# Patient Record
Sex: FEMALE | Race: WHITE | Employment: FULL TIME | URBAN - METROPOLITAN AREA
[De-identification: names, ages, dates, MRNs, and addresses within clinical notes are randomized per-mention and may not be internally consistent; named-entity substitution may affect disease eponyms.]

---

## 2017-01-09 ENCOUNTER — OFFICE VISIT (OUTPATIENT)
Dept: FAMILY MEDICINE | Facility: CLINIC | Age: 62
End: 2017-01-09
Payer: COMMERCIAL

## 2017-01-09 VITALS
TEMPERATURE: 98.1 F | BODY MASS INDEX: 29.86 KG/M2 | OXYGEN SATURATION: 96 % | WEIGHT: 197 LBS | HEIGHT: 68 IN | SYSTOLIC BLOOD PRESSURE: 118 MMHG | HEART RATE: 95 BPM | DIASTOLIC BLOOD PRESSURE: 84 MMHG

## 2017-01-09 DIAGNOSIS — R39.15 URINARY URGENCY: ICD-10-CM

## 2017-01-09 DIAGNOSIS — Z13.820 SCREENING FOR OSTEOPOROSIS: ICD-10-CM

## 2017-01-09 DIAGNOSIS — D12.6 ADENOMATOUS COLON POLYP: ICD-10-CM

## 2017-01-09 DIAGNOSIS — I82.401 ACUTE DEEP VEIN THROMBOSIS (DVT) OF RIGHT LOWER EXTREMITY, UNSPECIFIED VEIN (H): ICD-10-CM

## 2017-01-09 DIAGNOSIS — Z00.8 ENCOUNTER FOR BIOMETRIC SCREENING: ICD-10-CM

## 2017-01-09 DIAGNOSIS — E66.09 NON MORBID OBESITY DUE TO EXCESS CALORIES: ICD-10-CM

## 2017-01-09 DIAGNOSIS — Z23 NEED FOR PROPHYLACTIC VACCINATION AND INOCULATION AGAINST INFLUENZA: ICD-10-CM

## 2017-01-09 DIAGNOSIS — E78.5 HYPERLIPIDEMIA LDL GOAL <130: ICD-10-CM

## 2017-01-09 DIAGNOSIS — Z00.00 ROUTINE GENERAL MEDICAL EXAMINATION AT A HEALTH CARE FACILITY: Primary | ICD-10-CM

## 2017-01-09 DIAGNOSIS — A60.00 GENITAL HERPES SIMPLEX, UNSPECIFIED SITE: ICD-10-CM

## 2017-01-09 DIAGNOSIS — Z12.11 SPECIAL SCREENING FOR MALIGNANT NEOPLASMS, COLON: ICD-10-CM

## 2017-01-09 DIAGNOSIS — Z11.59 NEED FOR HEPATITIS C SCREENING TEST: ICD-10-CM

## 2017-01-09 DIAGNOSIS — F33.41 DEPRESSION, MAJOR, RECURRENT, IN PARTIAL REMISSION (H): ICD-10-CM

## 2017-01-09 DIAGNOSIS — Z83.49 FAMILY HISTORY OF THYROID DISORDER: ICD-10-CM

## 2017-01-09 DIAGNOSIS — Z78.0 ASYMPTOMATIC POSTMENOPAUSAL STATUS: ICD-10-CM

## 2017-01-09 LAB
ALBUMIN SERPL-MCNC: 4.3 G/DL (ref 3.4–5)
ALBUMIN UR-MCNC: NEGATIVE MG/DL
ALP SERPL-CCNC: 69 U/L (ref 40–150)
ALT SERPL W P-5'-P-CCNC: 20 U/L (ref 0–50)
ANION GAP SERPL CALCULATED.3IONS-SCNC: 10 MMOL/L (ref 3–14)
APPEARANCE UR: CLEAR
AST SERPL W P-5'-P-CCNC: 10 U/L (ref 0–45)
BILIRUB SERPL-MCNC: 0.6 MG/DL (ref 0.2–1.3)
BILIRUB UR QL STRIP: NEGATIVE
BUN SERPL-MCNC: 17 MG/DL (ref 7–30)
CALCIUM SERPL-MCNC: 9.3 MG/DL (ref 8.5–10.1)
CHLORIDE SERPL-SCNC: 107 MMOL/L (ref 94–109)
CHOLEST SERPL-MCNC: 203 MG/DL
CO2 SERPL-SCNC: 24 MMOL/L (ref 20–32)
COLOR UR AUTO: YELLOW
CREAT SERPL-MCNC: 0.73 MG/DL (ref 0.52–1.04)
ERYTHROCYTE [DISTWIDTH] IN BLOOD BY AUTOMATED COUNT: 13.1 % (ref 10–15)
GFR SERPL CREATININE-BSD FRML MDRD: 81 ML/MIN/1.7M2
GLUCOSE SERPL-MCNC: 85 MG/DL (ref 70–99)
GLUCOSE UR STRIP-MCNC: NEGATIVE MG/DL
HBA1C MFR BLD: 5.3 % (ref 4.3–6)
HCT VFR BLD AUTO: 40.6 % (ref 35–47)
HDLC SERPL-MCNC: 60 MG/DL
HGB BLD-MCNC: 13.9 G/DL (ref 11.7–15.7)
HGB UR QL STRIP: NEGATIVE
KETONES UR STRIP-MCNC: NEGATIVE MG/DL
LDLC SERPL CALC-MCNC: 98 MG/DL
LEUKOCYTE ESTERASE UR QL STRIP: NEGATIVE
MCH RBC QN AUTO: 32.2 PG (ref 26.5–33)
MCHC RBC AUTO-ENTMCNC: 34.2 G/DL (ref 31.5–36.5)
MCV RBC AUTO: 94 FL (ref 78–100)
NITRATE UR QL: NEGATIVE
NONHDLC SERPL-MCNC: 143 MG/DL
PH UR STRIP: 5 PH (ref 5–7)
PLATELET # BLD AUTO: 191 10E9/L (ref 150–450)
POTASSIUM SERPL-SCNC: 4.1 MMOL/L (ref 3.4–5.3)
PROT SERPL-MCNC: 7.7 G/DL (ref 6.8–8.8)
RBC # BLD AUTO: 4.32 10E12/L (ref 3.8–5.2)
SODIUM SERPL-SCNC: 141 MMOL/L (ref 133–144)
SP GR UR STRIP: 1.02 (ref 1–1.03)
TRIGL SERPL-MCNC: 227 MG/DL
TSH SERPL DL<=0.005 MIU/L-ACNC: 1.89 MU/L (ref 0.4–4)
URN SPEC COLLECT METH UR: NORMAL
UROBILINOGEN UR STRIP-ACNC: 0.2 EU/DL (ref 0.2–1)
WBC # BLD AUTO: 5.3 10E9/L (ref 4–11)

## 2017-01-09 PROCEDURE — 36415 COLL VENOUS BLD VENIPUNCTURE: CPT | Performed by: FAMILY MEDICINE

## 2017-01-09 PROCEDURE — 85027 COMPLETE CBC AUTOMATED: CPT | Performed by: FAMILY MEDICINE

## 2017-01-09 PROCEDURE — 86803 HEPATITIS C AB TEST: CPT | Mod: 90 | Performed by: FAMILY MEDICINE

## 2017-01-09 PROCEDURE — 84443 ASSAY THYROID STIM HORMONE: CPT | Performed by: FAMILY MEDICINE

## 2017-01-09 PROCEDURE — 99386 PREV VISIT NEW AGE 40-64: CPT | Mod: 25 | Performed by: FAMILY MEDICINE

## 2017-01-09 PROCEDURE — 80061 LIPID PANEL: CPT | Performed by: FAMILY MEDICINE

## 2017-01-09 PROCEDURE — 99000 SPECIMEN HANDLING OFFICE-LAB: CPT | Performed by: FAMILY MEDICINE

## 2017-01-09 PROCEDURE — 90471 IMMUNIZATION ADMIN: CPT | Performed by: FAMILY MEDICINE

## 2017-01-09 PROCEDURE — 82043 UR ALBUMIN QUANTITATIVE: CPT | Performed by: FAMILY MEDICINE

## 2017-01-09 PROCEDURE — 80053 COMPREHEN METABOLIC PANEL: CPT | Performed by: FAMILY MEDICINE

## 2017-01-09 PROCEDURE — 99214 OFFICE O/P EST MOD 30 MIN: CPT | Mod: 25 | Performed by: FAMILY MEDICINE

## 2017-01-09 PROCEDURE — 83036 HEMOGLOBIN GLYCOSYLATED A1C: CPT | Performed by: FAMILY MEDICINE

## 2017-01-09 PROCEDURE — 90686 IIV4 VACC NO PRSV 0.5 ML IM: CPT | Performed by: FAMILY MEDICINE

## 2017-01-09 PROCEDURE — 81003 URINALYSIS AUTO W/O SCOPE: CPT | Performed by: FAMILY MEDICINE

## 2017-01-09 RX ORDER — SIMVASTATIN 40 MG
TABLET ORAL
Qty: 90 TABLET | Refills: 1 | Status: SHIPPED | OUTPATIENT
Start: 2017-01-09 | End: 2017-01-31

## 2017-01-09 RX ORDER — CITALOPRAM HYDROBROMIDE 20 MG/1
TABLET ORAL
Qty: 90 TABLET | Refills: 1 | Status: SHIPPED | OUTPATIENT
Start: 2017-01-09 | End: 2017-01-31

## 2017-01-09 RX ORDER — VALACYCLOVIR HYDROCHLORIDE 500 MG/1
500 TABLET, FILM COATED ORAL 2 TIMES DAILY
Qty: 6 TABLET | Refills: 0 | Status: SHIPPED | OUTPATIENT
Start: 2017-01-09 | End: 2017-10-31

## 2017-01-09 ASSESSMENT — ANXIETY QUESTIONNAIRES
3. WORRYING TOO MUCH ABOUT DIFFERENT THINGS: SEVERAL DAYS
7. FEELING AFRAID AS IF SOMETHING AWFUL MIGHT HAPPEN: NOT AT ALL
1. FEELING NERVOUS, ANXIOUS, OR ON EDGE: NOT AT ALL
IF YOU CHECKED OFF ANY PROBLEMS ON THIS QUESTIONNAIRE, HOW DIFFICULT HAVE THESE PROBLEMS MADE IT FOR YOU TO DO YOUR WORK, TAKE CARE OF THINGS AT HOME, OR GET ALONG WITH OTHER PEOPLE: NOT DIFFICULT AT ALL
GAD7 TOTAL SCORE: 2
5. BEING SO RESTLESS THAT IT IS HARD TO SIT STILL: NOT AT ALL
6. BECOMING EASILY ANNOYED OR IRRITABLE: NOT AT ALL
2. NOT BEING ABLE TO STOP OR CONTROL WORRYING: SEVERAL DAYS

## 2017-01-09 ASSESSMENT — PATIENT HEALTH QUESTIONNAIRE - PHQ9: 5. POOR APPETITE OR OVEREATING: NOT AT ALL

## 2017-01-09 NOTE — Clinical Note
Please abstract the following data from this visit with this patient into the appropriate field in Epic:  Colonoscopy done on this date: 1/2016  (approximately), by this group: [facility] , results were clear - but hx of adenomatous polyps - needs repeat in 2021.  Pap smear done on this date: 11/17/2015  (approximately), by this group: [facility] , results were normal..

## 2017-01-09 NOTE — MR AVS SNAPSHOT
After Visit Summary   1/9/2017    Inessa Castaneda    MRN: [id number]           Patient Information     Date Of Birth          1955        Visit Information        Provider Department      1/9/2017 9:45 AM Gabriela Moore MD [facility]        Today's Diagnoses     Routine general medical examination at a health care facility    -  1     Need for hepatitis C screening test         Need for prophylactic vaccination and inoculation against influenza         Urinary urgency         Acute deep vein thrombosis (DVT) of right lower extremity, unspecified vein (H)         Cystocele, unspecified cystocele location         Genital herpes simplex, unspecified site         Adenomatous colon polyp-at age 52, then normal colonoscopy at age 55 and then at age 60- needs repeat colonoscopy 1/2021          Special screening for malignant neoplasms, colon         Family history of thyroid disorder- mother and sister         Non morbid obesity due to excess calories         Screening for osteoporosis         Asymptomatic postmenopausal status         Hyperlipidemia LDL goal <130         Depression, major, recurrent, in partial remission (H)           Care Instructions      Preventive Health Recommendations  Female Ages 50 - 64    Yearly exam: See your health care provider every year in order to  o Review health changes.   o Discuss preventive care.    o Review your medicines if your doctor has prescribed any.      Get a Pap test every three years (unless you have an abnormal result and your provider advises testing more often).    If you get Pap tests with HPV test, you only need to test every 5 years, unless you have an abnormal result.     You do not need a Pap test if your uterus was removed (hysterectomy) and you have not had cancer.    You should be tested each year for STDs (sexually transmitted diseases) if you're at risk.     Have a mammogram every 1 to 2 years.    Have a colonoscopy at  "age 50, or have a yearly FIT test (stool test). These exams screen for colon cancer.      Have a cholesterol test every 5 years, or more often if advised.    Have a diabetes test (fasting glucose) every three years. If you are at risk for diabetes, you should have this test more often.     If you are at risk for osteoporosis (brittle bone disease), think about having a bone density scan (DEXA).    Shots: Get a flu shot each year. Get a tetanus shot every 10 years.    Nutrition:     Eat at least 5 servings of fruits and vegetables each day.    Eat whole-grain bread, whole-wheat pasta and brown rice instead of white grains and rice.    Talk to your provider about Calcium and Vitamin D.     Lifestyle    Exercise at least 150 minutes a week (30 minutes a day, 5 days a week). This will help you control your weight and prevent disease.    Limit alcohol to one drink per day.    No smoking.     Wear sunscreen to prevent skin cancer.     See your dentist every six months for an exam and cleaning.    See your eye doctor every 1 to 2 years.        URINARY INCONTINENCE [female]  Urinary Incontinence means loss of control of the bladder. This may be due to infection, medicine, aging, poor pelvic muscle tone, bladder spasms, obesity or urinary retention.    STRESS INCONTINENCE is a special form of incontinence in women where the muscles and ligaments supporting the bladder have been stretched by pregnancy. Coughing, sneezing or laughing increases bladder pressure and may cause loss of urine.  URGE INCONTINENCE (also called \"overactive bladder\") is a sudden urge to urinate even though there may not be much urine in the bladder. The need to urinate often during the night is common. It is due to bladder spasms. It is a common cause of incontinence in both men and women.  Treatment of this condition depends on the cause. Infections of the bladder are treated with antibiotics. Urinary retention is treated with a bladder catheter. " Stress incontinence can be treated with special exercises to strengthen muscles around the bladder, although sometimes surgery is required.  HOME CARE:  1) Avoid foods and drinks that may irritate the bladder (alcohol, caffeine, carbonated drinks,artificial sweeteners,  chocolate, citrus fruits and acidic fruits and juices).  2) Limit fluid intake to 6 to 8 cups a day.  3) Lose weight if you are overweight. This will reduce your symptoms.  4) If your problem is stress incontinence, talk to your doctor about Kegel exercises to strengthen the muscles around the bladder.  5) If necessary, wear absorbent pads to catch urine.  6) Bathe daily to maintain good hygiene and prevent odors and skin irritation from contact with urine.  7) If an antibiotic was prescribed to treat a bladder infection, be sure to take it until finished, even if you are feeling better before then.  8) If a bladder catheter was left in place, it is important to keep the bacteria from getting into the collection bag. Use a leg band to secure the drainage tube, so it does not pull on the catheter. Drain the collection bag when it becomes full using the drain spout at the bottom of the bag. Do not disconnect the bag from the catheter.  FOLLOW UP with your doctor or this facility as advised. If a catheter was left in place, it will need to be removed or changed soon.  GET PROMPT MEDICAL ATTENTION if any of the following occur:  -- Fever of 99.5 F (orally)  -- Bladder pain or fullness  -- Abdominal swelling, nausea or vomiting or back pain  -- Catheter falls out or stops draining (no urine from catheter in six hours)  -- Weakness, dizziness or fainting    2202-2869 [facility], [address] [place] [address], [place] [address]. All rights reserved. This information is not intended as a substitute for professional medical care. Always follow your healthcare professional's instructions.    Dr. Moore's recommended diet to rev-up metabolism for weight  loss:   eat every 2-3 hours.   Lean protein = 2 egg whites, or turkey, chicken, fish, low fat, plain greek yogurt (try Fage 0% brand).. Low-glycemic fruits = strawberries, blueberries, raspberries, blackberries, nectarines, grapefruit, oranges,  apples.     2 oz. Lean protein + 1/2 cup  low-glycemic fruit --- breakfast and midmorning snack     2 oz. Lean protein + 1/4 cup whole grain rice or potato + 1 cup green veggie - lunch, dinner , and afternoon snack.     Evening snack : 1/2 cup of low glycemic fruit or an apple.      If you can't kill it and grill it, or pick it off a plant and eat it - DON'T EAT IT!     For occasional pasta - try Barilla Plus - has protein in it. - keep to 1/2 cup instead of your 1/4 cup of rice or potato.     If 5-6 small meals/day - too labor intensive, then keep to 3 meals plus 1 snack with 3 oz lean protein per meal with 2- 3 oz protein in your snack.         Start walking for exercise - If walking outside,   - rain or shine - walk a block, then come home, next day walk   2 blocks , then come home ; and then add a block further from home daily - work up to 30-45minutes daily or 3-4x/week - or can work  up to  3-4 miles briskly daily.       If walking on treadmill or at  the mall, start with 5 minutes first day, then 6 minutes next day , then 7 minutes next day, then 8 minutes next day, etc.   Gradually work up to the above goals.  No stopping.                   Thank you for choosing [facility]  for your Health Care. It was a pleasure seeing you at your visit today. Please contact us with any questions or concerns you may have.                   Gabriela Moore MD                                  To reach your [facility] care team after hours call:   [phone]    Our clinic hours are:     Monday- 7:30 am - 7:00 pm                             Tuesday through Friday- 7:30 am - 5:00 pm                                        Saturday- 8:00 am -  12:00 pm                  Phone:  [phone]    Our pharmacy hours are:     Monday  8:00 am to 7:00 pm      Tuesday through Friday 8:00am to 6:00pm                        Saturday - 9:00 am to 1:00 pm      Sunday : Closed.              Phone:  [phone]      There is also information available at our web site:  [email or web address]    If your provider ordered any lab tests and you do not receive the results within 10 business days, please call the clinic.    If you need a medication refill please contact your pharmacy.  Please allow 2 business days for your refill to be completed.    Our clinic offers telephone visits and e visits.  Please ask one of your team members to explain more.      Use [employer] systemshart (secure email communication and access to your chart) to send your primary care provider a message or make an appointment. Ask someone on your Team how to sign up for [employer].                           Follow-ups after your visit        Additional Services     UROLOGY ADULT REFERRAL       Your provider has referred you to: [facility]: [facility] [phone]   [email or web address]    Please be aware that coverage of these services is subject to the terms and limitations of your health insurance plan.  Call member services at your health plan with any benefit or coverage questions.      Please bring the following with you to your appointment:    (1) Any X-Rays, CTs or MRIs which have been performed.  Contact the facility where they were done to arrange for  prior to your scheduled appointment.  Any new CT, MRI or other procedures ordered by your specialist must be performed at a [facility] facility or coordinated by your clinic's referral office.  (2) List of current medications  (3) This referral request   (4) Any documents/labs given to you for this referral                  Future tests that were ordered for you today     Open Future Orders         "Priority Expected Expires Ordered    Fecal colorectal cancer screen FIT Routine 2017 4/3/2017 2017    DX Hip/Pelvis/Spine Routine  2018            Who to contact     If you have questions or need follow up information about today's clinic visit or your schedule please contact [facility] directly at [phone].  Normal or non-critical lab and imaging results will be communicated to you by [employer], letter or phone within 4 business days after the clinic has received the results. If you do not hear from us within 7 days, please contact the clinic through [employer] or phone. If you have a critical or abnormal lab result, we will notify you by phone as soon as possible.  Submit refill requests through TermSync or call your pharmacy and they will forward the refill request to us. Please allow 3 business days for your refill to be completed.          Additional Information About Your Visit        [employer] Information     TermSync lets you send messages to your doctor, view your test results, renew your prescriptions, schedule appointments and more. To sign up, go to [email or web address] . Click on \"Log in\" on the left side of the screen, which will take you to the Welcome page. Then click on \"Sign up Now\" on the right side of the page.     You will be asked to enter the access code listed below, as well as some personal information. Please follow the directions to create your username and password.     Your access code is: [email or web address]  Expires: 2017 11:03 AM     Your access code will  in 90 days. If you need help or a new code, please call your [facility] clinic or [phone].        Care EveryWhere ID     This is your Care EveryWhere ID. This could be used by other organizations to access your [facility] medical records  [id number]        Your Vitals Were     Pulse Temperature Height BMI (Body Mass Index) Pulse Oximetry       95 98.1  F (36.7  C) (Oral) 5' 7.5\" (1.715 m) " 30.38 kg/m2 96%        Blood Pressure from Last 3 Encounters:   01/09/17 118/84   05/27/08 108/60   02/12/08 122/80    Weight from Last 3 Encounters:   01/09/17 197 lb (89.359 kg)   05/27/08 184 lb (83.462 kg)   01/14/08 182 lb (82.555 kg)              We Performed the Following          ADMIN VACCINE, FIRST [84589]     CBC with platelets     Comprehensive metabolic panel     HC FLU VAC PRESRV FREE QUAD SPLIT VIR 3+YRS IM  [60145]     Hepatitis C Screen Reflex to HCV RNA Quant and Genotype     Lipid panel reflex to direct LDL     TSH with free T4 reflex     UA reflex to Microscopic and Culture     UROLOGY ADULT REFERRAL          Today's Medication Changes          These changes are accurate as of: 1/9/17 11:03 AM.  If you have any questions, ask your nurse or doctor.               Start taking these medicines.        Dose/Directions    valACYclovir 500 MG tablet   Commonly known as:  VALTREX   Used for:  Genital herpes simplex, unspecified site   Started by:  Gabriela Moore MD        Dose:  500 mg   Take 1 tablet (500 mg) by mouth 2 times daily for 3 days   Quantity:  6 tablet   Refills:  0         These medicines have changed or have updated prescriptions.        Dose/Directions    citalopram 20 MG tablet   Commonly known as:  celeXA   This may have changed:  See the new instructions.   Used for:  Depression, major, recurrent, in partial remission (H)   Changed by:  Gabriela Moore MD        one tab every day   Quantity:  90 tablet   Refills:  1       simvastatin 40 MG tablet   Commonly known as:  ZOCOR   This may have changed:  See the new instructions.   Used for:  Hyperlipidemia LDL goal <130   Changed by:  Gabriela Moore MD        one tab every evening   Quantity:  90 tablet   Refills:  1         Stop taking these medicines if you haven't already. Please contact your care team if you have questions.     COUMADIN 10 MG tablet   Generic drug:  warfarin   Stopped by:  Gabriela Moore  MD JONI                Where to get your medicines      These medications were sent to [facility] - [place], [place] - [address]  [address], [facility] [address]     Phone:  [phone]    - citalopram 20 MG tablet  - simvastatin 40 MG tablet  - valACYclovir 500 MG tablet             Primary Care Provider    [facility]                Thank you!     Thank you for choosing [facility]  for your care. Our goal is always to provide you with excellent care. Hearing back from our patients is one way we can continue to improve our services. Please take a few minutes to complete the written survey that you may receive in the mail after your visit with us. Thank you!             Your Updated Medication List - Protect others around you: Learn how to safely use, store and throw away your medicines at www.disposemymeds.org.          This list is accurate as of: 1/9/17 11:03 AM.  Always use your most recent med list.                   Brand Name Dispense Instructions for use    citalopram 20 MG tablet    celeXA    90 tablet    one tab every day       simvastatin 40 MG tablet    ZOCOR    90 tablet    one tab every evening       valACYclovir 500 MG tablet    VALTREX    6 tablet    Take 1 tablet (500 mg) by mouth 2 times daily for 3 days

## 2017-01-09 NOTE — NURSING NOTE
"Chief Complaint   Patient presents with     Physical       Initial /84 mmHg  Pulse 95  Temp(Src) 98.1  F (36.7  C) (Oral)  Ht 5' 7.5\" (1.715 m)  Wt 197 lb (89.359 kg)  BMI 30.38 kg/m2  SpO2 96% Estimated body mass index is 30.38 kg/(m^2) as calculated from the following:    Height as of this encounter: 5' 7.5\" (1.715 m).    Weight as of this encounter: 197 lb (89.359 kg).  BP completed using cuff size: large  "

## 2017-01-09 NOTE — PATIENT INSTRUCTIONS
Preventive Health Recommendations  Female Ages 50 - 64    Yearly exam: See your health care provider every year in order to  o Review health changes.   o Discuss preventive care.    o Review your medicines if your doctor has prescribed any.      Get a Pap test every three years (unless you have an abnormal result and your provider advises testing more often).    If you get Pap tests with HPV test, you only need to test every 5 years, unless you have an abnormal result.     You do not need a Pap test if your uterus was removed (hysterectomy) and you have not had cancer.    You should be tested each year for STDs (sexually transmitted diseases) if you're at risk.     Have a mammogram every 1 to 2 years.    Have a colonoscopy at age 50, or have a yearly FIT test (stool test). These exams screen for colon cancer.      Have a cholesterol test every 5 years, or more often if advised.    Have a diabetes test (fasting glucose) every three years. If you are at risk for diabetes, you should have this test more often.     If you are at risk for osteoporosis (brittle bone disease), think about having a bone density scan (DEXA).    Shots: Get a flu shot each year. Get a tetanus shot every 10 years.    Nutrition:     Eat at least 5 servings of fruits and vegetables each day.    Eat whole-grain bread, whole-wheat pasta and brown rice instead of white grains and rice.    Talk to your provider about Calcium and Vitamin D.     Lifestyle    Exercise at least 150 minutes a week (30 minutes a day, 5 days a week). This will help you control your weight and prevent disease.    Limit alcohol to one drink per day.    No smoking.     Wear sunscreen to prevent skin cancer.     See your dentist every six months for an exam and cleaning.    See your eye doctor every 1 to 2 years.        URINARY INCONTINENCE [female]  Urinary Incontinence means loss of control of the bladder. This may be due to infection, medicine, aging, poor pelvic muscle  "tone, bladder spasms, obesity or urinary retention.    STRESS INCONTINENCE is a special form of incontinence in women where the muscles and ligaments supporting the bladder have been stretched by pregnancy. Coughing, sneezing or laughing increases bladder pressure and may cause loss of urine.  URGE INCONTINENCE (also called \"overactive bladder\") is a sudden urge to urinate even though there may not be much urine in the bladder. The need to urinate often during the night is common. It is due to bladder spasms. It is a common cause of incontinence in both men and women.  Treatment of this condition depends on the cause. Infections of the bladder are treated with antibiotics. Urinary retention is treated with a bladder catheter. Stress incontinence can be treated with special exercises to strengthen muscles around the bladder, although sometimes surgery is required.  HOME CARE:  1) Avoid foods and drinks that may irritate the bladder (alcohol, caffeine, carbonated drinks,artificial sweeteners,  chocolate, citrus fruits and acidic fruits and juices).  2) Limit fluid intake to 6 to 8 cups a day.  3) Lose weight if you are overweight. This will reduce your symptoms.  4) If your problem is stress incontinence, talk to your doctor about Kegel exercises to strengthen the muscles around the bladder.  5) If necessary, wear absorbent pads to catch urine.  6) Bathe daily to maintain good hygiene and prevent odors and skin irritation from contact with urine.  7) If an antibiotic was prescribed to treat a bladder infection, be sure to take it until finished, even if you are feeling better before then.  8) If a bladder catheter was left in place, it is important to keep the bacteria from getting into the collection bag. Use a leg band to secure the drainage tube, so it does not pull on the catheter. Drain the collection bag when it becomes full using the drain spout at the bottom of the bag. Do not disconnect the bag from the " catheter.  FOLLOW UP with your doctor or this facility as advised. If a catheter was left in place, it will need to be removed or changed soon.  GET PROMPT MEDICAL ATTENTION if any of the following occur:  -- Fever of 99.5 F (orally)  -- Bladder pain or fullness  -- Abdominal swelling, nausea or vomiting or back pain  -- Catheter falls out or stops draining (no urine from catheter in six hours)  -- Weakness, dizziness or fainting    6864-2623 [facility], [address] [place] [address], [place] [address]. All rights reserved. This information is not intended as a substitute for professional medical care. Always follow your healthcare professional's instructions.    Dr. Moore's recommended diet to rev-up metabolism for weight loss:   eat every 2-3 hours.   Lean protein = 2 egg whites, or turkey, chicken, fish, low fat, plain greek yogurt (try Fage 0% brand).. Low-glycemic fruits = strawberries, blueberries, raspberries, blackberries, nectarines, grapefruit, oranges,  apples.     2 oz. Lean protein + 1/2 cup  low-glycemic fruit --- breakfast and midmorning snack     2 oz. Lean protein + 1/4 cup whole grain rice or potato + 1 cup green veggie - lunch, dinner , and afternoon snack.     Evening snack : 1/2 cup of low glycemic fruit or an apple.      If you can't kill it and grill it, or pick it off a plant and eat it - DON'T EAT IT!     For occasional pasta - try Barilla Plus - has protein in it. - keep to 1/2 cup instead of your 1/4 cup of rice or potato.     If 5-6 small meals/day - too labor intensive, then keep to 3 meals plus 1 snack with 3 oz lean protein per meal with 2- 3 oz protein in your snack.         Start walking for exercise - If walking outside,   - rain or shine - walk a block, then come home, next day walk   2 blocks , then come home ; and then add a block further from home daily - work up to 30-45minutes daily or 3-4x/week - or can work  up to  3-4 miles briskly daily.       If walking on treadmill  or at  the mall, start with 5 minutes first day, then 6 minutes next day , then 7 minutes next day, then 8 minutes next day, etc.   Gradually work up to the above goals.  No stopping.                   Thank you for choosing [facility]  for your Health Care. It was a pleasure seeing you at your visit today. Please contact us with any questions or concerns you may have.                   Gabriela Moore MD                                  To reach your [facility] care team after hours call:   [phone]    Our clinic hours are:     Monday- 7:30 am - 7:00 pm                             Tuesday through Friday- 7:30 am - 5:00 pm                                        Saturday- 8:00 am - 12:00 pm                  Phone:  [phone]    Our pharmacy hours are:     Monday  8:00 am to 7:00 pm      Tuesday through Friday 8:00am to 6:00pm                        Saturday - 9:00 am to 1:00 pm      Sunday : Closed.              Phone:  [phone]      There is also information available at our web site:  [email or web address]    If your provider ordered any lab tests and you do not receive the results within 10 business days, please call the clinic.    If you need a medication refill please contact your pharmacy.  Please allow 2 business days for your refill to be completed.    Our clinic offers telephone visits and e visits.  Please ask one of your team members to explain more.      Use [employer] (secure email communication and access to your chart) to send your primary care provider a message or make an appointment. Ask someone on your Team how to sign up for [employer].

## 2017-01-09 NOTE — Clinical Note
[facility]  [address] [place] [address]                  [phone]   January 11, 2017    Inessa Castaneda  [address]  [facility] [address]      Dear Inessa,    Here is a summary of your recent test results:    Liver and gallbladder tests are normal. (ALT,AST, Alk phos, bilirubin), kidney function is normal (Cr, GFR), Sodium is normal, Potassium is normal, Calcium is normal, Glucose is normal (diabetes screening test).   -LDL(bad) cholesterol level is normal.  -HDL(good) cholesterol level is normal.  -Triglycerides are elevated which can increase your heart disease risk.  A diet high in fat and simple carbohydrates, genetics and being overweight can contribute to this. ADVISE: Exercise, weight control, and omega-3 fatty acids (fish oil) 2043-6059 mg daily are helpful to improve this.  Rechecking your cholesterol in 6 months is recommended (lipid w/ LDL reflex, DX: hypertriglyceridemia - 272.1).  -TSH (thyroid stimulating hormone) level is normal which indicates normal thyroid function.  -Normal red blood cell (hgb) levels, normal white blood cell count and normal platelet levels.  -A1C (test of glucose and/or diabetes control the last 3 months) is at your goal. Please continue with current plan for weight loss.  No signs of diabetes at this time.   -Hepatitis C antibody screen test shows no signs of a previous hepatitis C infection.  -Microalbumin (urine protein) test is normal.  ADVISE: recheck annually  -Urine is normal  Your test results are enclosed.      Please contact me if you have any questions.    In addition, here is a list of due or overdue Health Maintenance reminders.    Health Maintenance Due   Topic Date Due     Discuss Advance Directive Planning  02/17/1973       Please call us at [phone] (or use [employer]) to address the above recommendations.            Thank you very much for trusting [facility]..     Healthy  regards,        Romario Vazquez M.D.        Results for orders placed or performed in visit on 01/09/17   Hepatitis C Screen Reflex to HCV RNA Quant and Genotype   Result Value Ref Range    Hepatitis C Antibody  NR     Nonreactive   Assay performance characteristics have not been established for newborns,   infants, and children     UA reflex to Microscopic and Culture   Result Value Ref Range    Color Urine Yellow     Appearance Urine Clear     Glucose Urine Negative NEG mg/dL    Bilirubin Urine Negative NEG    Ketones Urine Negative NEG mg/dL    Specific Gravity Urine 1.025 1.003 - 1.035    Blood Urine Negative NEG    pH Urine 5.0 5.0 - 7.0 pH    Protein Albumin Urine Negative NEG mg/dL    Urobilinogen Urine 0.2 0.2 - 1.0 EU/dL    Nitrite Urine Negative NEG    Leukocyte Esterase Urine Negative NEG    Source Midstream Urine    CBC with platelets   Result Value Ref Range    WBC 5.3 4.0 - 11.0 10e9/L    RBC Count 4.32 3.8 - 5.2 10e12/L    Hemoglobin 13.9 11.7 - 15.7 g/dL    Hematocrit 40.6 35.0 - 47.0 %    MCV 94 78 - 100 fl    MCH 32.2 26.5 - 33.0 pg    MCHC 34.2 31.5 - 36.5 g/dL    RDW 13.1 10.0 - 15.0 %    Platelet Count 191 150 - 450 10e9/L   Comprehensive metabolic panel   Result Value Ref Range    Sodium 141 133 - 144 mmol/L    Potassium 4.1 3.4 - 5.3 mmol/L    Chloride 107 94 - 109 mmol/L    Carbon Dioxide 24 20 - 32 mmol/L    Anion Gap 10 3 - 14 mmol/L    Glucose 85 70 - 99 mg/dL    Urea Nitrogen 17 7 - 30 mg/dL    Creatinine 0.73 0.52 - 1.04 mg/dL    GFR Estimate 81 >60 mL/min/1.7m2    GFR Estimate If Black >90   GFR Calc   >60 mL/min/1.7m2    Calcium 9.3 8.5 - 10.1 mg/dL    Bilirubin Total 0.6 0.2 - 1.3 mg/dL    Albumin 4.3 3.4 - 5.0 g/dL    Protein Total 7.7 6.8 - 8.8 g/dL    Alkaline Phosphatase 69 40 - 150 U/L    ALT 20 0 - 50 U/L    AST 10 0 - 45 U/L   Lipid panel reflex to direct LDL   Result Value Ref Range    Cholesterol 203 (H) <200 mg/dL    Triglycerides 227 (H) <150 mg/dL    HDL  Cholesterol 60 >49 mg/dL    LDL Cholesterol Calculated 98 <100 mg/dL    Non HDL Cholesterol 143 (H) <130 mg/dL   TSH with free T4 reflex   Result Value Ref Range    TSH 1.89 0.40 - 4.00 mU/L   Hemoglobin A1c   Result Value Ref Range    Hemoglobin A1C 5.3 4.3 - 6.0 %   Albumin Random Urine Quantitative   Result Value Ref Range    Creatinine Urine 251 mg/dL    Albumin Urine mg/L 31 mg/L    Albumin Urine mg/g Cr 12.39 0 - 25 mg/g Cr

## 2017-01-09 NOTE — PROGRESS NOTES
SUBJECTIVE:     CC: Inessa Castaneda is an 61 year old woman who presents for preventive health visit.   New pt to our clinic.  Was getting care at [facility] in [place], [place].     Hasn't been seen in the [place] system since 2008.     Healthy Habits:    Do you get at least three servings of calcium containing foods daily (dairy, green leafy vegetables, etc.)? yes    Amount of exercise or daily activities, outside of work: 3 day(s) per week    Problems taking medications regularly No    Medication side effects: No    Have you had an eye exam in the past two years? no    Do you see a dentist twice per year? yes    Do you have sleep apnea, excessive snoring or daytime drowsiness?no      Today needs follow up on :   Hyperlipidemia Follow-Up      Rate your low fat/cholesterol diet?: fair    Taking statin?  Yes, no muscle aches from statin    Other lipid medications/supplements?:  none       Depression and Anxiety Follow-Up    Status since last visit: No change    Other associated symptoms:None    Complicating factors:     Significant life event: No     Current substance abuse: None    PHQ-9 SCORE 1/9/2017   Total Score 8     MIGEL-7 SCORE 1/9/2017   Total Score 2        PHQ-9  English      PHQ-9   Any Language     GAD7         Today's PHQ-2 Score:   PHQ-2 ( 1999 Pfizer) 1/9/2017   Q1: Little interest or pleasure in doing things 0   Q2: Feeling down, depressed or hopeless 0   PHQ-2 Score 0       Abuse: Current or Past(Physical, Sexual or Emotional)- No  Do you feel safe in your environment - Yes    Social History   Substance Use Topics     Smoking status: Never Smoker      Smokeless tobacco: Never Used     Alcohol Use: 0.0 oz/week     0 Standard drinks or equivalent per week      Comment: 2 times/month  - 1-2 glasses of wine      The patient does not drink >3 drinks per day nor >7 drinks per week.    Recent Labs   Lab Test 11/17/15 11/13/14   CHOL  247*  232*   HDL  48  49   LDL  168*  136*   TRIG  155*  237*   NHDL  199*   183*       Reviewed orders with patient.  Reviewed health maintenance and updated orders accordingly - Yes    Mammo Decision Support:  Patient over age 50, mutual decision to screen reflected in health maintenance.    Pertinent mammograms are reviewed under the imaging tab.  History of abnormal Pap smear: NO - age 30- 65 PAP every 3 years recommended  All Histories reviewed and updated in [facility].  Health Maintenance   Topic Date Due     ADVANCE DIRECTIVE PLANNING Q5 YRS (NO INBASKET)  02/17/1975     INFLUENZA VACCINE (SYSTEM ASSIGNED)  09/01/2017     PAP SCREENING Q3 YR (SYSTEM ASSIGNED)  11/17/2018     MAMMO SCREEN Q2 YR (SYSTEM ASSIGNED)  12/12/2018     LIPID SCREEN Q5 YR FEMALE (SYSTEM ASSIGNED)  01/09/2022     TETANUS IMMUNIZATION (SYSTEM ASSIGNED)  08/28/2022     COLON CANCER SCREEN (SYSTEM ASSIGNED)  01/11/2026     HEPATITIS C SCREENING  Completed       Patient Active Problem List   Diagnosis     Depression, major, recurrent, in partial remission (H)     Hyperlipidemia LDL goal <130     Adenomatous colon polyp-at age 52, then normal colonoscopy at age 55 and then at age 60- needs repeat colonoscopy 1/2021      Urinary urgency     DVT (deep venous thrombosis) (H)     Genital herpes simplex, unspecified site     Cystocele     Family history of thyroid disorder- mother and sister     Non morbid obesity due to excess calories     Asymptomatic postmenopausal status       Past Medical History   Diagnosis Date     Hyperlipidemia LDL goal <130      Depression, major, recurrent, in partial remission (H) dx'd in her late 30's      varied with hormones, too. has been on 4 different medications      DVT (deep venous thrombosis) (H) 2008     s/p bladder sling surgery /repair      Genital herpes      Cystocele      midline      Adenomatous colon polyp at age 52      normal colonoscopy at age 55 and then at age 60     Urinary urgency        Past Surgical History   Procedure Laterality Date     Bunionectomy Left 1/21/2008      Dr. Alberto Laws - left foot medial and lateral with left 2nd toe surgery      Sling bladder suspension with fascia dipika  2008     - Dr. Emanuel Paz - had DVT afterward      Xr ankle surgery jennifer left  2008     Dr. Alberto Laws -arthroscopy ankle s/p fall - not fused      Breast biopsy, rt/lt Right ?      benign       Social History     Social History     Marital Status:      Spouse Name: Juan M Castaneda      Number of Children: 3     Years of Education: 16     Occupational History     Part-time - [employer]       activity asst for memory care area            degree in Elementary Education      Social History Main Topics     Smoking status: Never Smoker      Smokeless tobacco: Never Used     Alcohol Use: 0.0 oz/week     0 Standard drinks or equivalent per week      Comment: 2 times/month  - 1-2 glasses of wine      Drug Use: No      Comment: no herbal meds either      Sexual Activity:     Partners: Male     Birth Control/ Protection: Surgical      Comment:  -  s/p vasectomy      Other Topics Concern      Service No     Blood Transfusions No     Caffeine Concern Yes     1-2 servings of caffeine /week      Weight Concern Yes     Exercise Yes     walking video and cross  at home 1-2x/week      Seat Belt Yes     always      Self-Exams Yes     SBE encouraged monthly      Social History Narrative    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  Was tested after that and is HIV negative.  ? Tested for Hep C - will check.         Family History   Problem Relation Age of Onset     CEREBROVASCULAR DISEASE Mother 88     tiny strokes - TIA -  at age 90     Hypertension Mother      Alzheimer Disease Father 81      age 89     HEART DISEASE Father 63     s/p MI - first at age 63 , then s/p 4 vessel CABG     MENTAL ILLNESS Sister      bipolar disorder -with heroin addiction - on  "methadone - no contact for years      MENTAL ILLNESS Sister      2 with hoarding tendencies      MENTAL ILLNESS Brother       after fall while drunk - alcoholic      Vascular Disease Brother      dissecting thoracic aortic aneurysm      Hepatitis Brother      same brother as TAA with Hep C      Current Outpatient Prescriptions   Medication Sig Dispense Refill     valACYclovir (VALTREX) 500 MG tablet Take 1 tablet (500 mg) by mouth 2 times daily for 3 days 6 tablet 0     citalopram (CELEXA) 20 MG tablet one tab every day 90 tablet 1     simvastatin (ZOCOR) 40 MG tablet one tab every evening 90 tablet 1        Allergies   Allergen Reactions     Sulfa Drugs      rash        ROS:  C: NEGATIVE for fever, chills, change in weight  I: NEGATIVE for worrisome rashes, moles or lesions  E: NEGATIVE for vision changes or irritation  ENT: NEGATIVE for ear, mouth and throat problems  R: NEGATIVE for significant cough or SOB  B: NEGATIVE for masses, tenderness or discharge  CV: NEGATIVE for chest pain, palpitations or peripheral edema  GI: NEGATIVE for nausea, abdominal pain, heartburn, or change in bowel habits  : NEGATIVE for unusual urinary or vaginal symptoms. No vaginal bleeding.  M: NEGATIVE for significant arthralgias or myalgia  N: NEGATIVE for weakness, dizziness or paresthesias  P: NEGATIVE for changes in mood or affect     Problem list, Medication list, Allergies, and Medical/Social/Surgical histories reviewed in [facility] and updated as appropriate.  Labs reviewed in EPIC  OBJECTIVE:     /84 mmHg  Pulse 95  Temp(Src) 98.1  F (36.7  C) (Oral)  Ht 5' 7.5\" (1.715 m)  Wt 197 lb (89.359 kg)  BMI 30.38 kg/m2  SpO2 96%  EXAM:  GENERAL APPEARANCE: healthy, alert and no distress  EYES: Eyes grossly normal to inspection, PERRL and conjunctivae and sclerae normal  HENT: ear canals and TM's normal, nose and mouth without ulcers or lesions, oropharynx clear and oral mucous membranes moist  NECK: no adenopathy, no " asymmetry, masses, or scars and thyroid normal to palpation  RESP: lungs clear to auscultation - no rales, rhonchi or wheezes  BREAST: normal without masses, tenderness or nipple discharge and no palpable axillary masses or adenopathy  CV: regular rate and rhythm, normal S1 S2, no S3 or S4, no murmur, click or rub, no peripheral edema and peripheral pulses strong  ABDOMEN: soft, nontender, no hepatosplenomegaly, no masses and bowel sounds normal  MS: no musculoskeletal defects are noted and gait is age appropriate without ataxia  SKIN: no suspicious lesions or rashes  NEURO: Normal strength and tone, sensory exam grossly normal, mentation intact and speech normal  PSYCH: mentation appears normal and affect normal/bright    ASSESSMENT/PLAN:         ICD-10-CM    1. Routine general medical examination at a health care facility Z00.00    2. Urinary urgency R39.15 UROLOGY ADULT REFERRAL     UA reflex to Microscopic and Culture     OFFICE/OUTPT VISIT,NEW,LEVL III   3. Cystocele, unspecified cystocele location N81.10 OFFICE/OUTPT VISIT,NEW,LEVL III   4. Depression, major, recurrent, in partial remission (H) F33.41 citalopram (CELEXA) 20 MG tablet     OFFICE/OUTPT VISIT,MISHA MARTINL III   5. Genital herpes simplex, unspecified site A60.00 valACYclovir (VALTREX) 500 MG tablet     OFFICE/OUTPT VISIT,MISHA MARTINL III   6. Adenomatous colon polyp-at age 52, then normal colonoscopy at age 55 and then at age 60- needs repeat colonoscopy 1/2021  D12.6 OFFICE/OUTPT VISIT,NEW,LEVL III   7. Acute deep vein thrombosis (DVT) of right lower extremity, unspecified vein (H) I82.401 OFFICE/OUTPT VISIT,MISHA MARTINL III   8. Hyperlipidemia LDL goal <130 E78.5 CBC with platelets     Comprehensive metabolic panel     Lipid panel reflex to direct LDL     simvastatin (ZOCOR) 40 MG tablet     Albumin Random Urine Quantitative   9. Family history of thyroid disorder- mother and sister Z83.49 TSH with free T4 reflex   10. Non morbid obesity due to excess  "calories E66.09 OFFICE/OUTPT VISIT,MISHA MARTIN III   11. Screening for osteoporosis Z13.820 DX Hip/Pelvis/Spine   12. Asymptomatic postmenopausal status Z78.0 DX Hip/Pelvis/Spine     OFFICE/OUTPT VISIT,KELLY MARTIN III   13. Need for hepatitis C screening test Z11.59 Hepatitis C Screen Reflex to HCV RNA Quant and Genotype   14. Special screening for malignant neoplasms, colon Z12.11 Fecal colorectal cancer screen FIT   15. Encounter for biometric screening Z01.89 Hemoglobin A1c   16. Need for prophylactic vaccination and inoculation against influenza Z23 HC FLU VAC PRESRV FREE QUAD SPLIT VIR 3+YRS IM  [[id number]]          ADMIN VACCINE, FIRST [[id number]]       COUNSELING:   Reviewed preventive health counseling, as reflected in patient instructions    Spent approx. 32  minutes on pt care today outside of preventative care. All face to face time from 10:18am  to 10:50am , total pt visit time was from 10:18am to 11:18am.  Greater than 50% of time spent in coordination of care/counseling today re:  2. Urinary urgency    3. Cystocele, unspecified cystocele location    4. Depression, major, recurrent, in partial remission (H)    5. Genital herpes simplex, unspecified site    6. Adenomatous colon polyp-at age 52, then normal colonoscopy at age 55 and then at age 60- needs repeat colonoscopy 1/2021     7. Acute deep vein thrombosis (DVT) of right lower extremity, unspecified vein (H)    8. Hyperlipidemia LDL goal <130    9. Family history of thyroid disorder- mother and sister    10. Non morbid obesity due to excess calories    12. Asymptomatic postmenopausal status           reports that she has never smoked. She has never used smokeless tobacco.    Estimated body mass index is 30.38 kg/(m^2) as calculated from the following:    Height as of this encounter: 5' 7.5\" (1.715 m).    Weight as of this encounter: 197 lb (89.359 kg).   Weight management plan: see next    Counseling Resources:  ATP IV Guidelines  Pooled Cohorts Equation " Calculator  Breast Cancer Risk Calculator  FRAX Risk Assessment  ICSI Preventive Guidelines  Dietary Guidelines for Americans, 2010  [employer]'s MyPlate  ASA Prophylaxis  Lung CA Screening    Gabriela Moore MD  [facility]

## 2017-01-10 LAB
CREAT UR-MCNC: 251 MG/DL
HCV AB SERPL QL IA: NORMAL
MICROALBUMIN UR-MCNC: 31 MG/L
MICROALBUMIN/CREAT UR: 12.39 MG/G CR (ref 0–25)

## 2017-01-10 ASSESSMENT — PATIENT HEALTH QUESTIONNAIRE - PHQ9: SUM OF ALL RESPONSES TO PHQ QUESTIONS 1-9: 8

## 2017-01-10 ASSESSMENT — ANXIETY QUESTIONNAIRES: GAD7 TOTAL SCORE: 2

## 2017-01-31 DIAGNOSIS — E78.5 HYPERLIPIDEMIA LDL GOAL <130: ICD-10-CM

## 2017-01-31 DIAGNOSIS — F33.41 DEPRESSION, MAJOR, RECURRENT, IN PARTIAL REMISSION (H): Primary | ICD-10-CM

## 2017-02-03 RX ORDER — CITALOPRAM HYDROBROMIDE 20 MG/1
TABLET ORAL
Qty: 90 TABLET | Refills: 1 | Status: SHIPPED | OUTPATIENT
Start: 2017-02-03 | End: 2017-04-10 | Stop reason: DRUGHIGH

## 2017-02-03 RX ORDER — SIMVASTATIN 40 MG
TABLET ORAL
Qty: 90 TABLET | Refills: 1 | Status: SHIPPED | OUTPATIENT
Start: 2017-02-03 | End: 2018-01-24

## 2017-02-03 NOTE — TELEPHONE ENCOUNTER
celexa       Last Written Prescription Date: 1/9/2017  Last Fill Quantity: 90; # refills: 1  Last Office Visit with [facility] or  Health prescribing provider:  1/9/2017        Last PHQ-9 score on record=   PHQ-9 SCORE 1/9/2017   Total Score 8       AST       10   1/9/2017  ALT       20   1/9/2017    simvastatin     Last Written Prescription Date: 1/9/2017  Last Fill Quantity: 90, # refills: 1  Last Office Visit with [facility] or [facility] prescribing provider: 1/9/2017       CHOL      203   1/9/2017  HDL       60   1/9/2017  LDL       98   1/9/2017  TRIG      227   1/9/2017  No results found for this basename: cholhdlratio    Prescription approved per [facility] Refill Protocol.  Amber Dickerson RN

## 2017-03-09 ENCOUNTER — TELEPHONE (OUTPATIENT)
Dept: BONE DENSITY | Facility: CLINIC | Age: 62
End: 2017-03-09

## 2017-03-30 ENCOUNTER — TELEPHONE (OUTPATIENT)
Dept: BONE DENSITY | Facility: CLINIC | Age: 62
End: 2017-03-30

## 2017-04-10 ENCOUNTER — TELEPHONE (OUTPATIENT)
Dept: FAMILY MEDICINE | Facility: CLINIC | Age: 62
End: 2017-04-10

## 2017-04-10 DIAGNOSIS — F33.41 DEPRESSION, MAJOR, RECURRENT, IN PARTIAL REMISSION (H): Primary | ICD-10-CM

## 2017-04-10 RX ORDER — CITALOPRAM HYDROBROMIDE 40 MG/1
40 TABLET ORAL DAILY
Qty: 90 TABLET | Refills: 1 | Status: SHIPPED | OUTPATIENT
Start: 2017-04-10 | End: 2017-04-17

## 2017-04-10 NOTE — TELEPHONE ENCOUNTER
Pt calling regarding Citalopram dose.  Reports they have always been taking 40mg daily (1 tablet) and noted that their recent script from [facility] reports 20mg daily.  Pt notes this past script is from Dr. Jacquie Amezquita (Jose/Sayra) and was last filled 11/19/2016.    Pt requests 40mg dosing to be put through.  Reports a fax may be coming to the clinic for signature/fax. [facility].    [phone], can leave a detailed vm.    Ok per JV to put medication through. Called back and advised pt.    Amber Dickerson RN

## 2017-04-14 ENCOUNTER — TELEPHONE (OUTPATIENT)
Dept: FAMILY MEDICINE | Facility: CLINIC | Age: 62
End: 2017-04-14

## 2017-04-14 NOTE — TELEPHONE ENCOUNTER
Pt calling asking about her weaning off the Celexa - she has been off the medication for 4 days due to the pharmacy not sending ([facility]) when they said they did, wondering if she should just stay off of it   Pt was on 40 mg of Celexa for years and would like to go off and see how she does without it   Feeling very good off the medication - reports no side effects as of today        best # [phone] ok LM     Please review and advise     Thank you     Madeline Lewis RN, BSN  [facility]

## 2017-04-17 ENCOUNTER — TELEPHONE (OUTPATIENT)
Dept: BONE DENSITY | Facility: CLINIC | Age: 62
End: 2017-04-17

## 2017-04-17 NOTE — TELEPHONE ENCOUNTER
Usually with citalopram one can just stop without needing a taper.  Please inform pt. Please, call or return to clinic ,  if signs or symptoms worsen or fail to improve as anticipated.

## 2017-04-17 NOTE — TELEPHONE ENCOUNTER
Pt reports other times they have felt things in their head or twinges that happen after a second, adjusting to something.  But this is nothing to worry about.     Pt is exercising and getting outside more and wanted to make this change.   is leery about it, but they will both monitor how pt is doing and feeling.      The patient indicates understanding of these issues and agrees with the plan.  Amber Dickerson RN

## 2017-06-05 DIAGNOSIS — F33.1 MODERATE EPISODE OF RECURRENT MAJOR DEPRESSIVE DISORDER (H): Primary | ICD-10-CM

## 2017-06-05 ASSESSMENT — ANXIETY QUESTIONNAIRES
5. BEING SO RESTLESS THAT IT IS HARD TO SIT STILL: NOT AT ALL
6. BECOMING EASILY ANNOYED OR IRRITABLE: NEARLY EVERY DAY
3. WORRYING TOO MUCH ABOUT DIFFERENT THINGS: MORE THAN HALF THE DAYS
IF YOU CHECKED OFF ANY PROBLEMS ON THIS QUESTIONNAIRE, HOW DIFFICULT HAVE THESE PROBLEMS MADE IT FOR YOU TO DO YOUR WORK, TAKE CARE OF THINGS AT HOME, OR GET ALONG WITH OTHER PEOPLE: VERY DIFFICULT
2. NOT BEING ABLE TO STOP OR CONTROL WORRYING: MORE THAN HALF THE DAYS
7. FEELING AFRAID AS IF SOMETHING AWFUL MIGHT HAPPEN: NOT AT ALL
1. FEELING NERVOUS, ANXIOUS, OR ON EDGE: NEARLY EVERY DAY
GAD7 TOTAL SCORE: 10

## 2017-06-05 ASSESSMENT — PATIENT HEALTH QUESTIONNAIRE - PHQ9: 5. POOR APPETITE OR OVEREATING: NOT AT ALL

## 2017-06-05 NOTE — TELEPHONE ENCOUNTER
"Inessa is calling today as she went off her depression medication (Citalopram 40 mg) in April and now she is having symptoms.  (phone message noted 4/14/17)  She said she is feeling really irritable, she gets angry easily and is swearing at loved ones which is really rare for her.  She is surprised that not having that little pill makes such a big difference.     She was on Citalopram- no side effects but when she went off she thought \"oh good, now I may lose some weight\"  She is not in a good place \"weight wise\".    Her sister is at [facility] admitted for depression and this is very stressful and disconcerting for her.     She does not have a plan for harming herself but she does feel like \"wow this life seems really long, how much longer will I live?\"    Today:  PHQ9 18-somewhat difficult  MIGEL 10- very difficult    January scores:  PHQ9: 8  MIGEL:2    Routing to PCP  Rosie Ge RN- Triage [employer]    "

## 2017-06-06 ASSESSMENT — ANXIETY QUESTIONNAIRES: GAD7 TOTAL SCORE: 10

## 2017-06-06 ASSESSMENT — PATIENT HEALTH QUESTIONNAIRE - PHQ9: SUM OF ALL RESPONSES TO PHQ QUESTIONS 1-9: 18

## 2017-06-07 NOTE — TELEPHONE ENCOUNTER
Restart citalopram 40mg - take 1/2 tab daily for 2-3 days to hopefully avoid GI upset, then increase to 1 full tab.     Recommend office visit with me in the next month. In future , recommend if pt can't get in quickly , that we do a telephone visit for this type of urgent problem.   Please inform patient and Please assist pt in making appt to be seen.      Noted that this is to go to Mail order - pt won't get this for 10 days at least. ? Some to local pharmacy.     Please, call or return to clinic or go to the ER immediately if signs or symptoms worsen or fail to improve as anticipated.

## 2017-06-07 NOTE — TELEPHONE ENCOUNTER
Attempt # 1    Left non-detailed VM for patient to call back.    Roseline Whitehead, MESSI, RN, PHN  [facility]

## 2017-06-09 RX ORDER — CITALOPRAM HYDROBROMIDE 40 MG/1
40 TABLET ORAL DAILY
Qty: 90 TABLET | Refills: 0 | Status: SHIPPED | OUTPATIENT
Start: 2017-06-09 | End: 2017-08-10

## 2017-06-09 NOTE — TELEPHONE ENCOUNTER
Patient notified by phone.  She said she will call back to schedule.    MESSI Nicole, RN, PHN  [facility]

## 2017-07-17 ENCOUNTER — TELEPHONE (OUTPATIENT)
Dept: FAMILY MEDICINE | Facility: CLINIC | Age: 62
End: 2017-07-17

## 2017-07-17 DIAGNOSIS — R33.9 INCOMPLETE BLADDER EMPTYING: Primary | ICD-10-CM

## 2017-07-17 DIAGNOSIS — R33.9 INCOMPLETE BLADDER EMPTYING: ICD-10-CM

## 2017-07-17 DIAGNOSIS — N39.0 URINARY TRACT INFECTION: ICD-10-CM

## 2017-07-17 DIAGNOSIS — E78.1 HYPERTRIGLYCERIDEMIA: ICD-10-CM

## 2017-07-17 LAB
ALBUMIN UR-MCNC: NEGATIVE MG/DL
APPEARANCE UR: CLEAR
BACTERIA #/AREA URNS HPF: ABNORMAL /HPF
BILIRUB UR QL STRIP: NEGATIVE
COLOR UR AUTO: YELLOW
GLUCOSE UR STRIP-MCNC: NEGATIVE MG/DL
HGB UR QL STRIP: ABNORMAL
KETONES UR STRIP-MCNC: NEGATIVE MG/DL
LEUKOCYTE ESTERASE UR QL STRIP: ABNORMAL
NITRATE UR QL: NEGATIVE
PH UR STRIP: 6.5 PH (ref 5–7)
RBC #/AREA URNS AUTO: ABNORMAL /HPF (ref 0–2)
SP GR UR STRIP: <=1.005 (ref 1–1.03)
URN SPEC COLLECT METH UR: ABNORMAL
UROBILINOGEN UR STRIP-ACNC: 0.2 EU/DL (ref 0.2–1)
WBC #/AREA URNS AUTO: ABNORMAL /HPF (ref 0–2)

## 2017-07-17 PROCEDURE — 87186 SC STD MICRODIL/AGAR DIL: CPT | Performed by: FAMILY MEDICINE

## 2017-07-17 PROCEDURE — 87088 URINE BACTERIA CULTURE: CPT | Performed by: FAMILY MEDICINE

## 2017-07-17 PROCEDURE — 81001 URINALYSIS AUTO W/SCOPE: CPT | Performed by: FAMILY MEDICINE

## 2017-07-17 PROCEDURE — 87086 URINE CULTURE/COLONY COUNT: CPT | Performed by: FAMILY MEDICINE

## 2017-07-17 RX ORDER — NITROFURANTOIN 25; 75 MG/1; MG/1
100 CAPSULE ORAL 2 TIMES DAILY
Qty: 14 CAPSULE | Refills: 0 | Status: SHIPPED | OUTPATIENT
Start: 2017-07-17 | End: 2017-07-24

## 2017-07-17 NOTE — TELEPHONE ENCOUNTER
RN spoke with patient and she is wondering why she was referred to a specialist.  She is wondering if she could just get UA done to check for infection.  Dr. Parikh first available is 7/31/2017.      RN huddled with JV.  She gave verbal okay for UA/UC today.  She advised seeing specialist if problem persists long term.  Patient scheduled for lab only at 2:45 pm today.  She verbalized understanding agrees with plan.      Roseline Whitehead, MESSI, RN, N  [facility]) [phone]

## 2017-07-17 NOTE — TELEPHONE ENCOUNTER
UA RESULTS:  Recent Labs   Lab Test  07/17/17   1455   COLOR  Yellow   APPEARANCE  Clear   URINEGLC  Negative   URINEBILI  Negative   URINEKETONE  Negative   SG  <=1.005   UBLD  Large*   URINEPH  6.5   PROTEIN  Negative   UROBILINOGEN  0.2   NITRITE  Negative   LEUKEST  Moderate*   RBCU  25-50*   WBCU  *     RN huddled with MIN.  MIN reviewed results and is recommending Macrobid 1 tablet twice per day for 7 days, schedule with Dr. Parikh if feeling of not being able to empty bladder fully continues.  No need to recheck urine unless symptoms persist.  Patient verbalized understanding and agreed with plan.    She also asked about starting Fish Oil.  RN reviewed last cholesterol results and Fish Oil was recommended along with diet and exercise changes.  She has not implemented any changes or Fish Oil yet.  She will start Fish Oil and work on diet and exercise and then recheck fasting cholesterol.  Future order placed.     Rsoeline Whitehead, MESSI, RN, PHN  [facility]) [phone]

## 2017-07-17 NOTE — TELEPHONE ENCOUNTER
URINARY TRACT SYMPTOMS  Onset: few days    Description:   Painful urination (Dysuria): no  Blood in urine (Hematuria): no   Delay in urine (Hesitency): YES- feel like they cannot fully empty    Intensity: mild    Progression of Symptoms:  intermittent    Accompanying Signs & Symptoms:  Fever/chills: no   Flank pain: no   Circular uncomfortableness around right side under rib cage. That lasted overnight. (pt denies pain after eating or BM, urination)  Nausea and vomiting: no   Any vaginal symptoms: none  Abdominal/Pelvic Pain: no     History:   History of frequent UTI's: no   History of kidney stones: no   Sexually Active: YES- and does go to the bathroom afterwards  Possibility of pregnancy: No    Precipitating factors:   Pt notes they may have had less fluids recently which could have brought this on.     Therapies Tried and outcome: Increase fluid intake- not effective    Pt had a recent med check appt that JV had to cancel last week as well. Pt also has today off. [phone], ok to LM.    Per JV, have pt see [phone] Dr. Dominic Parikh [facility]. Left detailed vm and placed orders. Also to call, go to UC or ER with any further changes, especially the side pain sx.    Amber Dickerson RN

## 2017-07-19 LAB
BACTERIA SPEC CULT: ABNORMAL
MICRO REPORT STATUS: ABNORMAL
MICROORGANISM SPEC CULT: ABNORMAL
SPECIMEN SOURCE: ABNORMAL

## 2017-08-10 DIAGNOSIS — F33.1 MODERATE EPISODE OF RECURRENT MAJOR DEPRESSIVE DISORDER (H): ICD-10-CM

## 2017-08-10 NOTE — TELEPHONE ENCOUNTER
citalopram (CELEXA) 40 MG tablet     Last Written Prescription Date: 6/9/2017  Last Fill Quantity: 90 tablet, # refills: 0  Last Office Visit with [facility] [place] care provider:  7/10/2017        Last PHQ-9 score on record=   PHQ-9 SCORE 6/5/2017   Total Score 18

## 2017-08-11 NOTE — TELEPHONE ENCOUNTER
Pt due for new survey for refill.    Bree Dickerson contacted [place] on 08/11/17 and left a message. If patient calls back please contact RN team.  Abmer Dickerson RN

## 2017-08-14 ASSESSMENT — ANXIETY QUESTIONNAIRES
2. NOT BEING ABLE TO STOP OR CONTROL WORRYING: SEVERAL DAYS
6. BECOMING EASILY ANNOYED OR IRRITABLE: NOT AT ALL
5. BEING SO RESTLESS THAT IT IS HARD TO SIT STILL: NOT AT ALL
GAD7 TOTAL SCORE: 3
IF YOU CHECKED OFF ANY PROBLEMS ON THIS QUESTIONNAIRE, HOW DIFFICULT HAVE THESE PROBLEMS MADE IT FOR YOU TO DO YOUR WORK, TAKE CARE OF THINGS AT HOME, OR GET ALONG WITH OTHER PEOPLE: NOT DIFFICULT AT ALL
7. FEELING AFRAID AS IF SOMETHING AWFUL MIGHT HAPPEN: NOT AT ALL
1. FEELING NERVOUS, ANXIOUS, OR ON EDGE: SEVERAL DAYS
3. WORRYING TOO MUCH ABOUT DIFFERENT THINGS: SEVERAL DAYS

## 2017-08-14 ASSESSMENT — PATIENT HEALTH QUESTIONNAIRE - PHQ9
5. POOR APPETITE OR OVEREATING: NOT AT ALL
SUM OF ALL RESPONSES TO PHQ QUESTIONS 1-9: 7

## 2017-08-14 NOTE — TELEPHONE ENCOUNTER
PHQ-9 SCORE 1/9/2017 6/5/2017 8/14/2017   Total Score 8 18 7     MIGEL-7 SCORE 1/9/2017 6/5/2017 8/14/2017   Total Score 2 10 3     Pt feels like they have been feeling much better. Pt is also taking fish oil now along with cholesterol medication.     Routing to PCP for further review/recommendations/orders.  Amber Dickerson RN

## 2017-08-15 RX ORDER — CITALOPRAM HYDROBROMIDE 40 MG/1
TABLET ORAL
Qty: 90 TABLET | Refills: 1 | Status: SHIPPED | OUTPATIENT
Start: 2017-08-15 | End: 2018-02-16

## 2017-08-15 ASSESSMENT — ANXIETY QUESTIONNAIRES: GAD7 TOTAL SCORE: 3

## 2017-10-31 DIAGNOSIS — A60.00 GENITAL HERPES SIMPLEX, UNSPECIFIED SITE: ICD-10-CM

## 2017-10-31 RX ORDER — VALACYCLOVIR HYDROCHLORIDE 500 MG/1
500 TABLET, FILM COATED ORAL 2 TIMES DAILY
Qty: 6 TABLET | Refills: 0 | Status: SHIPPED | OUTPATIENT
Start: 2017-10-31 | End: 2018-03-01

## 2017-10-31 NOTE — TELEPHONE ENCOUNTER
Reason for Call:  Medication or medication refill:    Do you use a [facility]?  Name of the pharmacy and phone number for the current request:  Patient will  the prescription.     Name of the medication requested: Valacyclovir 400 mg    Can we leave a detailed message on this number? YES    Phone number patient can be reached at: Cell number on file:    [phone]     Best Time: Anytime    Call taken on 10/31/2017 at 12:04 PM by Christina Peterson

## 2017-10-31 NOTE — TELEPHONE ENCOUNTER
Medication Detail         Disp Refills Start End SANTOS     valACYclovir (VALTREX) 500 MG tablet 6 tablet 0 1/9/2017 1/12/2017 --     Sig: Take 1 tablet (500 mg) by mouth 2 times daily for 3 days     [place] [facility] [place] [facility] prescribing provider: 01/09/2017      Creatinine   Date Value Ref Range Status   01/09/2017 0.73 0.52 - 1.04 mg/dL Final     Refilled per RN Protocol.     Script placed on MD MIN desk for signature.     Arabella Washington RN  [facility]

## 2018-01-24 DIAGNOSIS — E78.5 HYPERLIPIDEMIA LDL GOAL <130: ICD-10-CM

## 2018-01-24 RX ORDER — SIMVASTATIN 40 MG
TABLET ORAL
Qty: 30 TABLET | Refills: 1 | Status: SHIPPED | OUTPATIENT
Start: 2018-01-24 | End: 2018-03-01

## 2018-01-24 NOTE — TELEPHONE ENCOUNTER
Reason for Call:  Medication or medication refill:    Do you use a [facility]?  Name of the pharmacy and phone number for the current request:     [facility]     Name of the medication requested: simvastatin (ZOCOR) 40 MG tablet     Other request: Pt has a px scheduled for 3/1/18     Can we leave a detailed message on this number? YES    Phone number patient can be reached at: Home number on file [phone] (home)    Best Time: anytime     Call taken on 1/24/2018 at 11:48 AM by Negra Hong

## 2018-01-24 NOTE — TELEPHONE ENCOUNTER
Prescription approved per [facility] Refill Protocol.    Roseline Whitehead, BS, RN, N  [facility]) [phone]

## 2018-01-24 NOTE — TELEPHONE ENCOUNTER
"Requested Prescriptions   Pending Prescriptions Disp Refills     simvastatin (ZOCOR) 40 MG tablet 90 tablet 1    Last Written Prescription Date:  2.3.17  Last Fill Quantity: 90 tablet,  # refills: 1   Last Office Visit with [facility], P or [facility] prescribing provider:  7.10.17   Future Office Visit:    Next 5 appointments (look out 90 days)     Mar 01, 2018 10:15 AM CST   [facility] with Gabriela Moore [facility]   [facility]    [address] [place] [id number] [phone]                  Sig: one tab every evening    Statins Protocol Failed    1/24/2018 12:32 PM       Failed - LDL on file in past 12 months    Recent Labs   Lab Test  01/09/17   1127   LDL  98            Failed - Recent or future visit with authorizing provider    Patient had office visit in the last year or has a visit in the next 30 days with authorizing provider.  See \"Patient Info\" tab in inbasket, or \"Choose Columns\" in Meds & Orders section of the refill encounter.            Passed - No abnormal creatine kinase in past 12 months    No lab results found.         Passed - Patient is age 18 or older       Passed - No active pregnancy on record       Passed - No positive pregnancy test in past 12 months          "

## 2018-01-29 ENCOUNTER — TRANSFERRED RECORDS (OUTPATIENT)
Dept: HEALTH INFORMATION MANAGEMENT | Facility: CLINIC | Age: 63
End: 2018-01-29

## 2018-02-16 DIAGNOSIS — F33.1 MODERATE EPISODE OF RECURRENT MAJOR DEPRESSIVE DISORDER (H): ICD-10-CM

## 2018-02-16 RX ORDER — CITALOPRAM HYDROBROMIDE 40 MG/1
TABLET ORAL
Qty: 30 TABLET | Refills: 0 | Status: SHIPPED | OUTPATIENT
Start: 2018-02-16 | End: 2018-03-01

## 2018-02-16 ASSESSMENT — ANXIETY QUESTIONNAIRES
1. FEELING NERVOUS, ANXIOUS, OR ON EDGE: SEVERAL DAYS
GAD7 TOTAL SCORE: 3
3. WORRYING TOO MUCH ABOUT DIFFERENT THINGS: SEVERAL DAYS
2. NOT BEING ABLE TO STOP OR CONTROL WORRYING: SEVERAL DAYS
6. BECOMING EASILY ANNOYED OR IRRITABLE: NOT AT ALL
7. FEELING AFRAID AS IF SOMETHING AWFUL MIGHT HAPPEN: NOT AT ALL
IF YOU CHECKED OFF ANY PROBLEMS ON THIS QUESTIONNAIRE, HOW DIFFICULT HAVE THESE PROBLEMS MADE IT FOR YOU TO DO YOUR WORK, TAKE CARE OF THINGS AT HOME, OR GET ALONG WITH OTHER PEOPLE: NOT DIFFICULT AT ALL
5. BEING SO RESTLESS THAT IT IS HARD TO SIT STILL: NOT AT ALL

## 2018-02-16 ASSESSMENT — PATIENT HEALTH QUESTIONNAIRE - PHQ9: 5. POOR APPETITE OR OVEREATING: NOT AT ALL

## 2018-02-16 NOTE — TELEPHONE ENCOUNTER
Reason for Call:  Other prescription    Detailed comments: Pt is on vacation and forgot her medications at home-- the pt would like a refill sent down the their pharmacy ASAP (of her celexa). Please give them a call at the number below.    Phone Number Patient can be reached at: Other phone number:  Kiran: [phone]    Best Time:     Can we leave a detailed message on this number? YES    Call taken on 2/16/2018 at 7:47 AM by Mae Crouch

## 2018-02-17 ASSESSMENT — PATIENT HEALTH QUESTIONNAIRE - PHQ9: SUM OF ALL RESPONSES TO PHQ QUESTIONS 1-9: 6

## 2018-02-17 ASSESSMENT — ANXIETY QUESTIONNAIRES: GAD7 TOTAL SCORE: 3

## 2018-03-01 ENCOUNTER — DOCUMENTATION ONLY (OUTPATIENT)
Dept: OTHER | Facility: CLINIC | Age: 63
End: 2018-03-01

## 2018-03-01 ENCOUNTER — OFFICE VISIT (OUTPATIENT)
Dept: FAMILY MEDICINE | Facility: CLINIC | Age: 63
End: 2018-03-01
Payer: COMMERCIAL

## 2018-03-01 VITALS
OXYGEN SATURATION: 97 % | SYSTOLIC BLOOD PRESSURE: 112 MMHG | TEMPERATURE: 98.3 F | HEART RATE: 60 BPM | HEIGHT: 67 IN | WEIGHT: 193.6 LBS | BODY MASS INDEX: 30.39 KG/M2 | DIASTOLIC BLOOD PRESSURE: 80 MMHG

## 2018-03-01 DIAGNOSIS — R39.15 URINARY URGENCY: ICD-10-CM

## 2018-03-01 DIAGNOSIS — F33.41 DEPRESSION, MAJOR, RECURRENT, IN PARTIAL REMISSION (H): ICD-10-CM

## 2018-03-01 DIAGNOSIS — Z12.4 SCREENING FOR MALIGNANT NEOPLASM OF CERVIX: ICD-10-CM

## 2018-03-01 DIAGNOSIS — R29.890 LOSS OF HEIGHT: ICD-10-CM

## 2018-03-01 DIAGNOSIS — Z83.49 FAMILY HISTORY OF THYROID DISORDER: ICD-10-CM

## 2018-03-01 DIAGNOSIS — D12.6 ADENOMATOUS POLYP OF COLON, UNSPECIFIED PART OF COLON: ICD-10-CM

## 2018-03-01 DIAGNOSIS — Z00.8 ENCOUNTER FOR BIOMETRIC SCREENING: ICD-10-CM

## 2018-03-01 DIAGNOSIS — I83.93 VARICOSE VEINS OF BOTH LOWER EXTREMITIES: ICD-10-CM

## 2018-03-01 DIAGNOSIS — A60.00 GENITAL HERPES SIMPLEX, UNSPECIFIED SITE: ICD-10-CM

## 2018-03-01 DIAGNOSIS — N39.41 URGE INCONTINENCE OF URINE: ICD-10-CM

## 2018-03-01 DIAGNOSIS — F33.1 MODERATE EPISODE OF RECURRENT MAJOR DEPRESSIVE DISORDER (H): ICD-10-CM

## 2018-03-01 DIAGNOSIS — E78.5 HYPERLIPIDEMIA LDL GOAL <130: ICD-10-CM

## 2018-03-01 DIAGNOSIS — Z78.0 ASYMPTOMATIC POSTMENOPAUSAL STATUS: ICD-10-CM

## 2018-03-01 DIAGNOSIS — Z00.01 ENCOUNTER FOR ROUTINE ADULT MEDICAL EXAM WITH ABNORMAL FINDINGS: Primary | ICD-10-CM

## 2018-03-01 DIAGNOSIS — Z82.62 FAMILY HISTORY OF OSTEOPOROSIS: ICD-10-CM

## 2018-03-01 DIAGNOSIS — M20.5X2 OVERRIDING TOE OF LEFT FOOT: ICD-10-CM

## 2018-03-01 LAB
ALBUMIN SERPL-MCNC: 3.9 G/DL (ref 3.4–5)
ALP SERPL-CCNC: 57 U/L (ref 40–150)
ALT SERPL W P-5'-P-CCNC: 17 U/L (ref 0–50)
ANION GAP SERPL CALCULATED.3IONS-SCNC: 7 MMOL/L (ref 3–14)
AST SERPL W P-5'-P-CCNC: 12 U/L (ref 0–45)
BILIRUB SERPL-MCNC: 0.5 MG/DL (ref 0.2–1.3)
BUN SERPL-MCNC: 19 MG/DL (ref 7–30)
CALCIUM SERPL-MCNC: 9.1 MG/DL (ref 8.5–10.1)
CHLORIDE SERPL-SCNC: 106 MMOL/L (ref 94–109)
CHOLEST SERPL-MCNC: 188 MG/DL
CO2 SERPL-SCNC: 26 MMOL/L (ref 20–32)
CREAT SERPL-MCNC: 0.78 MG/DL (ref 0.52–1.04)
ERYTHROCYTE [DISTWIDTH] IN BLOOD BY AUTOMATED COUNT: 13.1 % (ref 10–15)
GFR SERPL CREATININE-BSD FRML MDRD: 75 ML/MIN/1.7M2
GLUCOSE SERPL-MCNC: 84 MG/DL (ref 70–99)
HBA1C MFR BLD: 5.3 % (ref 4.3–6)
HCT VFR BLD AUTO: 39.8 % (ref 35–47)
HDLC SERPL-MCNC: 60 MG/DL
HGB BLD-MCNC: 13.4 G/DL (ref 11.7–15.7)
LDLC SERPL CALC-MCNC: 107 MG/DL
MCH RBC QN AUTO: 31.9 PG (ref 26.5–33)
MCHC RBC AUTO-ENTMCNC: 33.7 G/DL (ref 31.5–36.5)
MCV RBC AUTO: 95 FL (ref 78–100)
NONHDLC SERPL-MCNC: 128 MG/DL
PLATELET # BLD AUTO: 208 10E9/L (ref 150–450)
POTASSIUM SERPL-SCNC: 4.4 MMOL/L (ref 3.4–5.3)
PROT SERPL-MCNC: 7.3 G/DL (ref 6.8–8.8)
RBC # BLD AUTO: 4.2 10E12/L (ref 3.8–5.2)
SODIUM SERPL-SCNC: 139 MMOL/L (ref 133–144)
TRIGL SERPL-MCNC: 106 MG/DL
TSH SERPL DL<=0.005 MIU/L-ACNC: 2.18 MU/L (ref 0.4–4)
WBC # BLD AUTO: 4.7 10E9/L (ref 4–11)

## 2018-03-01 PROCEDURE — 83036 HEMOGLOBIN GLYCOSYLATED A1C: CPT | Performed by: FAMILY MEDICINE

## 2018-03-01 PROCEDURE — 87624 HPV HI-RISK TYP POOLED RSLT: CPT | Performed by: FAMILY MEDICINE

## 2018-03-01 PROCEDURE — 80053 COMPREHEN METABOLIC PANEL: CPT | Performed by: FAMILY MEDICINE

## 2018-03-01 PROCEDURE — 36415 COLL VENOUS BLD VENIPUNCTURE: CPT | Performed by: FAMILY MEDICINE

## 2018-03-01 PROCEDURE — 85027 COMPLETE CBC AUTOMATED: CPT | Performed by: FAMILY MEDICINE

## 2018-03-01 PROCEDURE — 99396 PREV VISIT EST AGE 40-64: CPT | Performed by: FAMILY MEDICINE

## 2018-03-01 PROCEDURE — 99213 OFFICE O/P EST LOW 20 MIN: CPT | Mod: 25 | Performed by: FAMILY MEDICINE

## 2018-03-01 PROCEDURE — 80061 LIPID PANEL: CPT | Performed by: FAMILY MEDICINE

## 2018-03-01 PROCEDURE — 84443 ASSAY THYROID STIM HORMONE: CPT | Performed by: FAMILY MEDICINE

## 2018-03-01 PROCEDURE — G0145 SCR C/V CYTO,THINLAYER,RESCR: HCPCS | Performed by: FAMILY MEDICINE

## 2018-03-01 RX ORDER — SIMVASTATIN 40 MG
TABLET ORAL
Qty: 90 TABLET | Refills: 1 | Status: SHIPPED | OUTPATIENT
Start: 2018-03-01 | End: 2018-09-10

## 2018-03-01 RX ORDER — OXYBUTYNIN CHLORIDE 10 MG/1
10-20 TABLET, EXTENDED RELEASE ORAL DAILY
Qty: 60 TABLET | Refills: 5 | Status: SHIPPED | OUTPATIENT
Start: 2018-03-01 | End: 2018-03-07

## 2018-03-01 RX ORDER — VALACYCLOVIR HYDROCHLORIDE 500 MG/1
500 TABLET, FILM COATED ORAL 2 TIMES DAILY
Qty: 6 TABLET | Refills: 11 | Status: SHIPPED | OUTPATIENT
Start: 2018-03-01 | End: 2019-06-12

## 2018-03-01 RX ORDER — CITALOPRAM HYDROBROMIDE 40 MG/1
TABLET ORAL
Qty: 90 TABLET | Refills: 3 | Status: SHIPPED | OUTPATIENT
Start: 2018-03-01 | End: 2018-06-04

## 2018-03-01 ASSESSMENT — ANXIETY QUESTIONNAIRES
7. FEELING AFRAID AS IF SOMETHING AWFUL MIGHT HAPPEN: NOT AT ALL
5. BEING SO RESTLESS THAT IT IS HARD TO SIT STILL: NOT AT ALL
6. BECOMING EASILY ANNOYED OR IRRITABLE: NOT AT ALL
2. NOT BEING ABLE TO STOP OR CONTROL WORRYING: NOT AT ALL
GAD7 TOTAL SCORE: 2
1. FEELING NERVOUS, ANXIOUS, OR ON EDGE: SEVERAL DAYS
IF YOU CHECKED OFF ANY PROBLEMS ON THIS QUESTIONNAIRE, HOW DIFFICULT HAVE THESE PROBLEMS MADE IT FOR YOU TO DO YOUR WORK, TAKE CARE OF THINGS AT HOME, OR GET ALONG WITH OTHER PEOPLE: NOT DIFFICULT AT ALL
3. WORRYING TOO MUCH ABOUT DIFFERENT THINGS: SEVERAL DAYS

## 2018-03-01 ASSESSMENT — PATIENT HEALTH QUESTIONNAIRE - PHQ9: 5. POOR APPETITE OR OVEREATING: NOT AT ALL

## 2018-03-01 NOTE — PROGRESS NOTES
SUBJECTIVE:   CC: Inessa Castaneda is an 63 year old woman who presents for preventive health visit.     Healthy Habits:    Do you get at least three servings of calcium containing foods daily (dairy, green leafy vegetables, etc.)? yes    Amount of exercise or daily activities, outside of work: 3 day(s) per week    Problems taking medications regularly No    Medication side effects: No    Have you had an eye exam in the past two years? no    Do you see a dentist twice per year? yes    Do you have sleep apnea, excessive snoring or daytime drowsiness?no    Still losing urine - occasionally - urgency - even after bladder sling surgery - not losing urine with coughing/lifting/sneezing. Will sometimes get a trickle of urine down her leg without much warning.  Would like to avoid oral bladder medication.  Once a week or so will have a cup of coffee.  Occasionally will use an artificial sweetener.        Hyperlipidemia Follow-Up:   Recent Labs   Lab Test  01/09/17   1127 11/17/15   CHOL  203*  247*   HDL  60  48   LDL  98  168*   TRIG  227*  155*            Rate your low fat/cholesterol diet?: good    Taking statin?  Yes, no muscle aches from statin    Other lipid medications/supplements?:  Fish oil/Omega 3    Lab Results   Component Value Date    CHOL 203 01/09/2017    CHOL 247 11/17/2015     Lab Results   Component Value Date    HDL 60 01/09/2017    HDL 48 11/17/2015     Lab Results   Component Value Date    LDL 98 01/09/2017     11/17/2015     Lab Results   Component Value Date    TRIG 227 01/09/2017    TRIG 155 11/17/2015     No results found for: CHOLHDLRATIO      Depression Followup- feels like her mood has been pretty good lately.     Status since last visit: Stable    See PHQ-9 for current symptoms.  Other associated symptoms: None    Complicating factors:   Significant life event:  Yes-  family   Current substance abuse:  None  Anxiety or Panic symptoms:  No    PHQ-9 8/14/2017 2/16/2018 3/1/2018   Total  Score 7 6 4   Q9: Suicide Ideation Not at all Not at all Not at all       PHQ-9  English  PHQ-9   Any Language  Suicide Assessment Five-step Evaluation and Treatment (SAFE-T)    Today's PHQ-2 Score:   PHQ-2 ( 1999 [employer]) 3/1/2018 1/9/2017   Q1: Little interest or pleasure in doing things 0 0   Q2: Feeling down, depressed or hopeless 0 0   PHQ-2 Score 0 0       Abuse: Current or Past(Physical, Sexual or Emotional)- No  Do you feel safe in your environment - Yes      Social History   Substance Use Topics     Smoking status: Never Smoker     Smokeless tobacco: Never Used     Alcohol use 0.0 oz/week     0 Standard drinks or equivalent per week      Comment: 2 times/month  - 1-2 glasses of wine      If you drink alcohol do you typically have >3 drinks per day or >7 drinks per week? No                     Reviewed orders with patient.  Reviewed health maintenance and updated orders accordingly - Yes  BP Readings from Last 3 Encounters:   03/01/18 112/80   01/09/17 118/84   05/27/08 108/60    Wt Readings from Last 3 Encounters:   03/01/18 193 lb 9.6 oz (87.8 kg)   01/09/17 197 lb (89.4 kg)   05/27/08 184 lb (83.5 kg)                  Patient Active Problem List   Diagnosis     Depression, major, recurrent, in partial remission (H)     Hyperlipidemia LDL goal <130     Adenomatous colon polyp-at age 52, then normal colonoscopy at age 55 and then at age 60- needs repeat colonoscopy 1/2021      Urinary urgency     DVT (deep venous thrombosis) (H)     Genital herpes simplex, unspecified site     Cystocele     Family history of thyroid disorder- mother and sister     Non morbid obesity due to excess calories     Asymptomatic postmenopausal status     Urge incontinence of urine     Family history of osteoporosis- mother with signif. disease      Past Surgical History:   Procedure Laterality Date     BREAST BIOPSY, RT/LT Right 2000?     benign     BUNIONECTOMY Left 1/21/2008    Dr. Alberto Laws - left foot medial and lateral  with left 2nd toe surgery      SLING BLADDER SUSPENSION WITH FASCIA SULAIMAN  2008    - Dr. Emanuel Paz - had DVT afterward      XR ANKLE SURGERY NIXON LEFT  2008    Dr. Alberto Laws -arthroscopy ankle s/p fall - not fused        Social History   Substance Use Topics     Smoking status: Never Smoker     Smokeless tobacco: Never Used     Alcohol use 0.0 oz/week     0 Standard drinks or equivalent per week      Comment: 2 times/month  - 1-2 glasses of wine      Family History   Problem Relation Age of Onset     CEREBROVASCULAR DISEASE Mother 88     tiny strokes - TIA -  at age 90     Hypertension Mother      Alzheimer Disease Father 81      age 89     HEART DISEASE Father 63     s/p MI - first at age 63 , then s/p 4 vessel CABG     MENTAL ILLNESS Sister      bipolar disorder -with heroin addiction - on methadone - no contact for years      MENTAL ILLNESS Sister      2 with hoarding tendencies      MENTAL ILLNESS Brother       after fall while drunk - alcoholic      Vascular Disease Brother      dissecting thoracic aortic aneurysm      Hepatitis Brother      same brother as TAA with Hep C          Current Outpatient Prescriptions   Medication Sig Dispense Refill     Omega-3 Fatty Acids (FISH OIL PO)        calcium carbonate-vitamin D (CALCIUM 600+D) 600-200 MG-UNIT TABS Take 1 tablet by mouth       oxybutynin (DITROPAN XL) 10 MG 24 hr tablet Take 1-2 tablets (10-20 mg) by mouth daily 60 tablet 5     citalopram (CELEXA) 40 MG tablet Take 1 tablet by mouth  daily 90 tablet 3     simvastatin (ZOCOR) 40 MG tablet one tab every evening 90 tablet 1     valACYclovir (VALTREX) 500 MG tablet Take 1 tablet (500 mg) by mouth 2 times daily 6 tablet 11     [DISCONTINUED] citalopram (CELEXA) 40 MG tablet Take 1 tablet by mouth  daily 30 tablet 0     [DISCONTINUED] simvastatin (ZOCOR) 40 MG tablet one tab every evening 30 tablet 1     [DISCONTINUED] valACYclovir (VALTREX) 500 MG tablet Take 1 tablet (500 mg)  by mouth 2 times daily 6 tablet 0     Allergies   Allergen Reactions     Sulfa Drugs      rash     Recent Labs   Lab Test  18   1042  17   1127 11/17/15 11/13/14   A1C  5.3  5.3   --    --    LDL   --   98  168*  136*   HDL   --   60  48  49   TRIG   --   227*  155*  237*   ALT   --   20   --    --    CR   --   0.73   --    --    GFRESTIMATED   --   81   --    --    GFRESTBLACK   --   >90   GFR Calc     --    --    POTASSIUM   --   4.1   --    --    TSH   --   1.89   --    --       Mammogram: Patient over age 50, mutual decision to screen reflected in health maintenance.    No results found.      History of abnormal Pap smear: NO - age 30- 65 PAP every 3 years recommended    Lab Results   Component Value Date    PAP NIL 2015       Reviewed and updated as needed this visit by clinical staff  Tobacco  Allergies  Meds  Med Hx  Surg Hx  Fam Hx  Soc Hx        Reviewed and updated as needed this visit by Provider  Fam Hx        Past Medical History:   Diagnosis Date     Adenomatous colon polyp at age 52     normal colonoscopy at age 55 and then at age 60     Cystocele     midline      Depression, major, recurrent, in partial remission (H) dx'd in her late 30's     varied with hormones, too. has been on 4 different medications      DVT (deep venous thrombosis) (H)     s/p bladder sling surgery /repair , no further work up done     Genital herpes      Hyperlipidemia LDL goal <130      Urinary urgency       Past Surgical History:   Procedure Laterality Date     BREAST BIOPSY, RT/LT Right ?     benign     BUNIONECTOMY Left 2008    Dr. Alberto Laws - left foot medial and lateral with left 2nd toe surgery      SLING BLADDER SUSPENSION WITH FASCIA SULAIMAN  2008    - Dr. Emanuel Paz - had DVT afterward      XR ANKLE SURGERY NIXON LEFT  2008    Dr. Alberto Laws -arthroscopy ankle s/p fall - not fused      Obstetric History       T3      L3     SAB0  "  TAB0   Ectopic0   Multiple0   Live Births0       # Outcome Date GA Lbr [place]/2nd Weight Sex Delivery Anes PTL Lv   3 Term            2 Term            1 Term               Obstetric Comments    x 3 = no complications except with varicose veins in the 3rd pregnancy and some low blood pressure after delivery - had to wear compression hose - wasn't on blood thinners        ROS: needs refills on her regular meds.    C: NEGATIVE for fever, chills, change in weight  I: NEGATIVE for worrisome rashes, moles or lesions  E: NEGATIVE for vision changes or irritation  ENT: NEGATIVE for ear, mouth and throat problems  R: NEGATIVE for significant cough or SOB  B: NEGATIVE for masses, tenderness or discharge  CV: NEGATIVE for chest pain, palpitations or peripheral edema  GI: NEGATIVE for nausea, abdominal pain, heartburn, or change in bowel habits  : NEGATIVE for unusual urinary or vaginal symptoms. No vaginal bleeding.  M: NEGATIVE for significant arthralgias or myalgia  N: NEGATIVE for weakness, dizziness or paresthesias  E: NEGATIVE for temperature intolerance, skin/hair changes  P: NEGATIVE for changes in mood or affect .     OBJECTIVE:   /80 (BP Location: Left arm, Patient Position: Chair, Cuff Size: Adult Large)  Pulse 60  Temp 98.3  F (36.8  C) (Oral)  Ht 5' 7.25\" (1.708 m)  Wt 193 lb 9.6 oz (87.8 kg)  SpO2 97%  BMI 30.1 kg/m2  EXAM:  GENERAL APPEARANCE: healthy, alert and no distress  EYES: Eyes grossly normal to inspection, PERRL and conjunctivae and sclerae normal  HENT: ear canals and TM's normal, nose and mouth without ulcers or lesions, oropharynx clear and oral mucous membranes moist  NECK: no adenopathy, no asymmetry, masses, or scars and thyroid normal to palpation  RESP: lungs clear to auscultation - no rales, rhonchi or wheezes  BREAST: normal without masses, tenderness or nipple discharge and no palpable axillary masses or adenopathy  CV: regular rate and rhythm, normal S1 S2, no S3 or S4, no " murmur, click or rub, no peripheral edema and peripheral pulses strong  ABDOMEN: soft, nontender, no hepatosplenomegaly, no masses and bowel sounds normal   (female): normal female external genitalia, normal urethral meatus, vaginal mucosal atrophy noted, normal cervix, adnexae, and uterus without masses or abnormal discharge  MS: no musculoskeletal defects are noted and gait is age appropriate without ataxia, has an overriding toe on the left foot- 2nd over 3rd toe that is concerning for her.   SKIN: no suspicious lesions or rashes  NEURO: Normal strength and tone, sensory exam grossly normal, mentation intact and speech normal  PSYCH: mentation appears normal and affect normal/bright  Some varicose veins noted in bilateral lower legs that are bothering her.  Referral given. No seem thrombosed today.     ASSESSMENT/PLAN:       ICD-10-CM    1. Encounter for routine adult medical exam with abnormal findings Z00.01 Omega-3 Fatty Acids (FISH OIL PO)     calcium carbonate-vitamin D (CALCIUM 600+D) 600-200 MG-UNIT TABS   2. Depression, major, recurrent, in partial remission (H) F33.41 DEPRESSION ACTION PLAN (DAP)   3. Urge incontinence of urine N39.41 DISCONTINUED: oxybutynin (DITROPAN XL) 10 MG 24 hr tablet   4. Hyperlipidemia LDL goal <130- needs f/u today - awaiting lab results.  E78.5 Comprehensive metabolic panel     CBC with platelets     Lipid panel reflex to direct LDL Fasting     JUST IN CASE     simvastatin (ZOCOR) 40 MG tablet   5. Family history of thyroid disorder- mother and sister Z83.49 TSH with free T4 reflex     JUST IN CASE   6. Adenomatous polyp of colon, unspecified part of colon D12.6    7. Urinary urgency R39.15    8. Varicose veins of both lower extremities I83.93 VASCULAR SURGERY REFERRAL   9. Overriding toe of left foot M20.5X2 ORTHO  REFERRAL   10. Moderate episode of recurrent major depressive disorder (H) F33.1 citalopram (CELEXA) 40 MG tablet   11. Genital herpes simplex,  "unspecified site A60.00 valACYclovir (VALTREX) 500 MG tablet   12. Loss of height- used to be 5ft 9in tall - 5ft 7in today - mother with hx of osteoporosis R29.890 DX Hip/Pelvis/Spine   13. Family history of osteoporosis Z82.62 DX Hip/Pelvis/Spine   14. Encounter for biometric screening Z01.89 Hemoglobin A1c     JUST IN CASE   15. Asymptomatic postmenopausal status Z78.0 DX Hip/Pelvis/Spine   16. Screening for malignant neoplasm of cervix Z12.4 Pap imaged thin layer screen with HPV - recommended age 30 - 65 years (select HPV order below)     HPV High Risk Types DNA Cervical       COUNSELING:   Reviewed preventive health counseling, as reflected in patient instructions     Wt Readings from Last 5 Encounters:   03/01/18 193 lb 9.6 oz (87.8 kg)   01/09/17 197 lb (89.4 kg)   05/27/08 184 lb (83.5 kg)   01/14/08 182 lb (82.6 kg)   01/08/08 189 lb 2 oz (85.8 kg)     Establish an exercise regimen. Activity goal: 45 minutes 5 days a week. New exercise routine: cardiovascular workout on exercise equipment, walking and weightlifting. Diet regimen was discussed and plan is self-directed dieting: reduce calories, reduce portions, reduce processed  carbs, increase fruits/vegetables and avoid sweets and supervised diet program. Avoid artificial sweeteners and \"diet\" drinks and sodas.       Recheck with me in 6 months or sooner, if needed, for mood and cholesterol.      reports that she has never smoked. She has never used smokeless tobacco.    Estimated body mass index is 30.1 kg/(m^2) as calculated from the following:    Height as of this encounter: 5' 7.25\" (1.708 m).    Weight as of this encounter: 193 lb 9.6 oz (87.8 kg).   Weight management plan: see above     Consider trying   tolterodine or vesicare, if oxybutynin doesn't work - pt would like to try the oxybutinin today.     Spent  20 minutes on pt care today outside of preventative care. All face to face time from 11:15am  to 11:35am .  Greater than 50% of time spent in " coordination of care/counseling today re:  Depression, major, recurrent, in partial remission (H)    Urge incontinence of urine    Hyperlipidemia LDL goal <130- needs f/u today - awaiting lab results.     Family history of thyroid disorder- mother and sister    Adenomatous polyp of colon, unspecified part of colon    Urinary urgency    Varicose veins of both lower extremities    Overriding toe of left foot    Moderate episode of recurrent major depressive disorder (H)    Genital herpes simplex, unspecified site    Loss of height- used to be 5ft 9in tall - 5ft 7in today - mother with hx of osteoporosis    Family history of osteoporosis       Having some mild stool incontinence at times as well.  Discussed doing kegel exercises to help with pelvic floor muscles as well.     Counseling Resources:  ATP IV Guidelines  Pooled Cohorts Equation Calculator  Breast Cancer Risk Calculator  FRAX Risk Assessment  ICSI Preventive Guidelines  Dietary Guidelines for Americans, 2010  USDA's MyPlate  ASA Prophylaxis  Lung CA Screening    Gabriela Moore MD  [facility]

## 2018-03-01 NOTE — NURSING NOTE
"Chief Complaint   Patient presents with     Physical       Initial /80 (BP Location: Left arm, Patient Position: Chair, Cuff Size: Adult Large)  Pulse 60  Temp 98.3  F (36.8  C) (Oral)  Ht 5' 7.25\" (1.708 m)  Wt 193 lb 9.6 oz (87.8 kg)  SpO2 97%  BMI 30.1 kg/m2 Estimated body mass index is 30.1 kg/(m^2) as calculated from the following:    Height as of this encounter: 5' 7.25\" (1.708 m).    Weight as of this encounter: 193 lb 9.6 oz (87.8 kg).  Medication Reconciliation: complete   Sarah Palacios CMA  "

## 2018-03-01 NOTE — LETTER
[facility]  [address] [place] [id number]                  [phone]   March 7, 2018    Inessa Castaneda  [address] [facility] [address]      Dear Inessa,    Here is a summary of your recent test results:    -Liver and gallbladder tests are normal. (ALT,AST, Alk phos, bilirubin), kidney function is normal (Cr, GFR), Sodium is normal, Potassium is normal, Calcium is normal, Glucose is normal (diabetes screening test).   -Cholesterol levels (LDL,HDL, Triglycerides) are normal.  ADVISE: rechecking in 1 year.   -TSH (thyroid stimulating hormone) level is normal which indicates normal thyroid function.   -Normal red blood cell (hgb) levels, normal white blood cell count and normal platelet levels.   -A1C (test of diabetes control the last 2-3 months) is at your goal. Please continue with current plan. Also, see me and recheck your A1C test in 12 months.     For additional lab test information, [email or web address] is an excellent reference.     Your test results are enclosed.      Please contact me if you have any questions.    In addition, here is a list of due or overdue Health Maintenance reminders.    Health Maintenance Due   Topic Date Due     Osteoporosis Screening (Dexa) - every 2 years   02/17/1987     Please call us at [phone] (or use [employer]) to address the above recommendations.            Thank you very much for trusting [facility]..     Healthy regards,       Gabriela Moore M.D.      Results for orders placed or performed in visit on 03/01/18   Comprehensive metabolic panel   Result Value Ref Range    Sodium 139 133 - 144 mmol/L    Potassium 4.4 3.4 - 5.3 mmol/L    Chloride 106 94 - 109 mmol/L    Carbon Dioxide 26 20 - 32 mmol/L    Anion Gap 7 3 - 14 mmol/L    Glucose 84 70 - 99 mg/dL    Urea Nitrogen 19 7 - 30 mg/dL    Creatinine 0.78 0.52 - 1.04 mg/dL    GFR Estimate 75 >60 mL/min/1.7m2    GFR Estimate If Black >90  >60 mL/min/1.7m2    Calcium 9.1 8.5 - 10.1 mg/dL    Bilirubin Total 0.5 0.2 - 1.3 mg/dL    Albumin 3.9 3.4 - 5.0 g/dL    Protein Total 7.3 6.8 - 8.8 g/dL    Alkaline Phosphatase 57 40 - 150 U/L    ALT 17 0 - 50 U/L    AST 12 0 - 45 U/L   CBC with platelets   Result Value Ref Range    WBC 4.7 4.0 - 11.0 10e9/L    RBC Count 4.20 3.8 - 5.2 10e12/L    Hemoglobin 13.4 11.7 - 15.7 g/dL    Hematocrit 39.8 35.0 - 47.0 %    MCV 95 78 - 100 fl    MCH 31.9 26.5 - 33.0 pg    MCHC 33.7 31.5 - 36.5 g/dL    RDW 13.1 10.0 - 15.0 %    Platelet Count 208 150 - 450 10e9/L   TSH with free T4 reflex   Result Value Ref Range    TSH 2.18 0.40 - 4.00 mU/L   Lipid panel reflex to direct LDL Fasting   Result Value Ref Range    Cholesterol 188 <200 mg/dL    Triglycerides 106 <150 mg/dL    HDL Cholesterol 60 >49 mg/dL    LDL Cholesterol Calculated 107 (H) <100 mg/dL    Non HDL Cholesterol 128 <130 mg/dL   Hemoglobin A1c   Result Value Ref Range    Hemoglobin A1C 5.3 4.3 - 6.0 %   Pap imaged thin layer screen with HPV - recommended age 30 - 65 years (select HPV order below)   Result Value Ref Range    PAP NIL     Copath Report         Patient Name: URSULA ESTEVEZ  MR#: [id number]  Specimen #: [id number]  Collected: 3/1/2018  Received: 3/2/2018  Reported: 3/5/2018 15:11  Ordering Phy(s): PRINCESS IRWIN    For improved result formatting, select 'View Enhanced Report Format' under   Linked Documents section.    SPECIMEN/STAIN PROCESS:  Pap imaged thin layer prep screening (Surepath, FocalPoint with guided   screening)       Pap-Cyto x 1, HPV ordered x 1    SOURCE: Cervical, endocervical  ----------------------------------------------------------------   Pap imaged thin layer prep screening (Surepath, FocalPoint with guided   screening)  SPECIMEN ADEQUACY:  Satisfactory for evaluation.  -Transformation zone component present.    CYTOLOGIC INTERPRETATION:    Negative for intraepithelial lesion or malignancy    Electronically signed out  by:  LORETTA Lagn (ASCP)    Processed and screened at [facility],   [facility]    CLINICAL HISTORY:    Post Menopausal, Previous  normal pap  Date of Last Pap: 11/17/2015,    Papanicolaou Test Limitations:  Cervical cytology is a screening test with   limited sensitivity; regular  screening is critical for cancer prevention; Pap tests are primarily   effective for the diagnosis/prevention of  squamous cell carcinoma, not adenocarcinomas or other cancers.    TESTING LAB LOCATION:  [facility] [address] [place]  [address] [phone]    COLLECTION SITE:  Client:  [facility]  Location: [facility] (R)     HPV High Risk Types DNA Cervical   Result Value Ref Range    HPV Source SurePath     HPV 16 DNA Negative NEG^Negative    HPV 18 DNA Negative NEG^Negative    Other HR HPV Negative NEG^Negative    Final Diagnosis This patient's sample is negative for HPV DNA.     Specimen Description Cervical Cells

## 2018-03-01 NOTE — LETTER
My Depression Action Plan  Name: Inessa Castaneda   Date of Birth 1955  Date: 3/1/2018    My doctor: Gabriela Moore   My clinic: [address] [id number] [phone]          GREEN    ZONE   Good Control    What it looks like:     Things are going generally well. You have normal up s and down s. You may even feel depressed from time to time, but bad moods usually last less than a day.   What you need to do:  1. Continue to care for yourself (see self care plan)  2. Check your depression survival kit and update it as needed  3. Follow your physician s recommendations including any medication.  4. Do not stop taking medication unless you consult with your physician first.           YELLOW         ZONE Getting Worse    What it looks like:     Depression is starting to interfere with your life.     It may be hard to get out of bed; you may be starting to isolate yourself from others.    Symptoms of depression are starting to last most all day and this has happened for several days.     You may have suicidal thoughts but they are not constant.   What you need to do:     1. Call your care team, your response to treatment will improve if you keep your care team informed of your progress. Yellow periods are signs an adjustment may need to be made.     2. Continue your self-care, even if you have to fake it!    3. Talk to someone in your support network    4. Open up your depression survival kit           RED    ZONE Medical Alert - Get Help    What it looks like:     Depression is seriously interfering with your life.     You may experience these or other symptoms: You can t get out of bed most days, can t work or engage in other necessary activities, you have trouble taking care of basic hygiene, or basic responsibilities, thoughts of suicide or death that will not go away, self-injurious behavior.     What you need to do:  1. Call your  care team and request a same-day appointment. If they are not available (weekends or after hours) call your local crisis line, emergency room or 911.      Electronically signed by: Sarah Palacios, March 1, 2018    Depression Self Care Plan / Survival Kit    Self-Care for Depression  Here s the deal. Your body and mind are really not as separate as most people think.  What you do and think affects how you feel and how you feel influences what you do and think. This means if you do things that people who feel good do, it will help you feel better.  Sometimes this is all it takes.  There is also a place for medication and therapy depending on how severe your depression is, so be sure to consult with your medical provider and/ or Behavioral Health Consultant if your symptoms are worsening or not improving.     In order to better manage my stress, I will:    Exercise  Get some form of exercise, every day. This will help reduce pain and release endorphins, the  feel good  chemicals in your brain. This is almost as good as taking antidepressants!  This is not the same as joining a gym and then never going! (they count on that by the way ) It can be as simple as just going for a walk or doing some gardening, anything that will get you moving.      Hygiene   Maintain good hygiene (Get out of bed in the morning, Make your bed, Brush your teeth, Take a shower, and Get dressed like you were going to work, even if you are unemployed).  If your clothes don't fit try to get ones that do.    Diet  I will strive to eat foods that are good for me, drink plenty of water, and avoid excessive sugar, caffeine, alcohol, and other mood-altering substances.  Some foods that are helpful in depression are: complex carbohydrates, B vitamins, flaxseed, fish or fish oil, fresh fruits and vegetables.    Psychotherapy  I agree to participate in Individual Therapy (if recommended).    Medication  If prescribed medications, I agree to take them.   Missing doses can result in serious side effects.  I understand that drinking alcohol, or other illicit drug use, may cause potential side effects.  I will not stop my medication abruptly without first discussing it with my provider.    Staying Connected With Others  I will stay in touch with my friends, family members, and my primary care provider/team.    Use your imagination  Be creative.  We all have a creative side; it doesn t matter if it s oil painting, sand castles, or mud pies! This will also kick up the endorphins.    Witness Beauty  (AKA stop and smell the roses) Take a look outside, even in mid-winter. Notice colors, textures. Watch the squirrels and birds.     Service to others  Be of service to others.  There is always someone else in need.  By helping others we can  get out of ourselves  and remember the really important things.  This also provides opportunities for practicing all the other parts of the program.    Humor  Laugh and be silly!  Adjust your TV habits for less news and crime-drama and more comedy.    Control your stress  Try breathing deep, massage therapy, biofeedback, and meditation. Find time to relax each day.     My support system    Clinic Contact:  Phone number:    Contact 1:  Phone number:    Contact 2:  Phone number:    Confucianist/:  Phone number:    Therapist:  Phone number:    Local crisis center:    Phone number:    Other community support:  Phone number:

## 2018-03-01 NOTE — PROGRESS NOTES
"Received referral via \"in-basket\", per  guidelines referral forwarded to [facility].    Tootie Carpenter, MESSIN, RN      "

## 2018-03-01 NOTE — LETTER
March 9, 2018    Inessa Castaneda  [address] [facility] [address]    Dear Inessa,  We are happy to inform you that your PAP smear result from 3/1/18 is normal.  We are now able to do a follow up test on PAP smears. The DNA test is for HPV (Human Papilloma Virus). Cervical cancer is closely linked with certain types of HPV. Your result showed no evidence of high risk HPV.  Therefore we recommend you return in 3 years for your next pap smear.  You will still need to return to the clinic every year for an annual exam and other preventive tests.  Please contact the clinic at [phone] with any questions.  Sincerely,    Gabriela Moore MD/latoya

## 2018-03-01 NOTE — MR AVS SNAPSHOT
"              After Visit Summary   3/1/2018    Inessa Castaneda    MRN: [id number]           Patient Information     Date Of Birth          1955        Visit Information        Provider Department      3/1/2018 10:15 AM Gabriela Moore MD [facility] [place]        Today's Diagnoses     Encounter for routine adult medical exam with abnormal findings    -  1    Depression, major, recurrent, in partial remission (H)        Hyperlipidemia LDL goal <130        Encounter for biometric screening        Family history of thyroid disorder- mother and sister        Adenomatous polyp of colon, unspecified part of colon        Asymptomatic postmenopausal status        Urge incontinence of urine        Screening for malignant neoplasm of cervix        Varicose veins of both lower extremities        Overriding toe of left foot        Moderate episode of recurrent major depressive disorder (H)        Genital herpes simplex, unspecified site        Loss of height- used to be 5ft 9in tall - 5ft 7in today - mother with hx of osteoporosis        Family history of osteoporosis          Care Instructions    Establish an exercise regimen. Activity goal: 45 minutes 5 days a week. New exercise routine: cardiovascular workout on exercise equipment, walking and weightlifting. Diet regimen was discussed and plan is self-directed dieting: reduce calories, reduce portions, reduce processed  carbs, increase fruits/vegetables and avoid sweets and supervised diet program. Avoid artificial sweeteners and \"diet\" drinks and sodas.       Dr. Moore's recommended diet to rev-up metabolism for weight loss:   eat every 2-3 hours.   Lean protein = 2 egg whites, or turkey, chicken, fish, low fat, plain greek yogurt (try Fage 0% brand).. Low-glycemic fruits = strawberries, blueberries, raspberries, blackberries, nectarines, grapefruit, oranges,  apples.     2 oz. Lean protein + 1/2 cup  low-glycemic fruit --- breakfast and " midmorning snack     2 oz. Lean protein + 1/4 cup whole grain rice or potato + 1 cup green veggie - lunch, dinner , and afternoon snack.     Evening snack : 1/2 cup of low glycemic fruit or an apple.      If you can't kill it and grill it, or pick it off a plant and eat it - DON'T EAT IT!     For occasional pasta - try Barilla Plus - has protein in it. - keep to 1/2 cup instead of your 1/4 cup of rice or potato.     If 5-6 small meals/day - too labor intensive, then keep to 3 meals plus 1 snack with 3 oz lean protein per meal with 2- 3 oz protein in your snack.        Preventive Health Recommendations  Female Ages 50 - 64    Yearly exam: See your health care provider every year in order to  o Review health changes.   o Discuss preventive care.    o Review your medicines if your doctor has prescribed any.      Get a Pap test every three years (unless you have an abnormal result and your provider advises testing more often).    If you get Pap tests with HPV test, you only need to test every 5 years, unless you have an abnormal result.     You do not need a Pap test if your uterus was removed (hysterectomy) and you have not had cancer.    You should be tested each year for STDs (sexually transmitted diseases) if you're at risk.     Have a mammogram every 1 to 2 years.    Have a colonoscopy at age 50, or have a yearly FIT test (stool test). These exams screen for colon cancer.      Have a cholesterol test every 5 years, or more often if advised.    Have a diabetes test (fasting glucose) every three years. If you are at risk for diabetes, you should have this test more often.     If you are at risk for osteoporosis (brittle bone disease), think about having a bone density scan (DEXA).    Shots: Get a flu shot each year. Get a tetanus shot every 10 years.    Nutrition:     Eat at least 5 servings of fruits and vegetables each day.    Eat whole-grain bread, whole-wheat pasta and brown rice instead of white grains and  rice.    Talk to your provider about Calcium and Vitamin D.     Lifestyle    Exercise at least 150 minutes a week (30 minutes a day, 5 days a week). This will help you control your weight and prevent disease.    Limit alcohol to one drink per day.    No smoking.     Wear sunscreen to prevent skin cancer.     See your dentist every six months for an exam and cleaning.    See your eye doctor every 1 to 2 years.               Thank you for choosing [facility]  for your Health Care. It was a pleasure seeing you at your visit today. Please contact us with any questions or concerns you may have.                   Gabriela Moore MD                                  To reach your [facility] care team after hours call:   [phone]    Our clinic hours are:     Monday- 7:30 am - 7:00 pm                             Tuesday through Friday- 7:30 am - 5:00 pm                                        Saturday- 8:00 am - 12:00 pm                  Phone:  [phone]    Our pharmacy hours are:     Monday  8:00 am to 7:00 pm      Tuesday through Friday 8:00am to 6:00pm                        Saturday - 9:00 am to 1:00 pm      Sunday : Closed.              Phone:  [phone]      There is also information available at our web site:  [email or web address]    If your provider ordered any lab tests and you do not receive the results within 10 business days, please call the clinic.    If you need a medication refill please contact your pharmacy.  Please allow 2 business days for your refill to be completed.    Our clinic offers telephone visits and e visits.  Please ask one of your team members to explain more.      Use [employer]ee[employer] (secure email communication and access to your chart) to send your primary care provider a message or make an appointment. Ask someone on your Team how to sign up for Xeebelt.                 URINARY INCONTINENCE [female]  Urinary Incontinence means loss of control of the  "bladder. This may be due to infection, medicine, aging, poor pelvic muscle tone, bladder spasms, obesity or urinary retention.    STRESS INCONTINENCE is a special form of incontinence in women where the muscles and ligaments supporting the bladder have been stretched by pregnancy. Coughing, sneezing or laughing increases bladder pressure and may cause loss of urine.  URGE INCONTINENCE (also called \"overactive bladder\") is a sudden urge to urinate even though there may not be much urine in the bladder. The need to urinate often during the night is common. It is due to bladder spasms. It is a common cause of incontinence in both men and women.  Treatment of this condition depends on the cause. Infections of the bladder are treated with antibiotics. Urinary retention is treated with a bladder catheter. Stress incontinence can be treated with special exercises to strengthen muscles around the bladder, although sometimes surgery is required.    HOME CARE:  1) Avoid foods and drinks that may irritate the bladder (alcohol, caffeine, artificial sweeteners, carbonated drinks, chocolate, citrus fruits and acidic fruits and juices).  2) Limit fluid intake to 4 to 6 cups a day, unless out in the hot weather.   3) Lose weight if you are overweight. This will reduce your symptoms.  4) If your problem is stress incontinence, talk to your doctor about Kegel exercises to strengthen the muscles around the bladder.  5) If necessary, wear absorbent pads to catch urine.  6) Bathe daily to maintain good hygiene and prevent odors and skin irritation from contact with urine.  7) If an antibiotic was prescribed to treat a bladder infection, be sure to take it until finished, even if you are feeling better before then.  8) If a bladder catheter was left in place, it is important to keep the bacteria from getting into the collection bag. Use a leg band to secure the drainage tube, so it does not pull on the catheter. Drain the collection bag " when it becomes full using the drain spout at the bottom of the bag. Do not disconnect the bag from the catheter.  FOLLOW UP with your doctor or this facility as advised. If a catheter was left in place, it will need to be removed or changed soon.    GET PROMPT MEDICAL ATTENTION if any of the following occur:  -- Fever of 99.5 F (orally)  -- Bladder pain or fullness  -- Abdominal swelling, nausea or vomiting or back pain  -- Catheter falls out or stops draining (no urine from catheter in six hours)  -- Weakness, dizziness or fainting    [id number] [facility], [address] [place] [address]. All rights reserved. This information is not intended as a substitute for professional medical care. Always follow your healthcare professional's instructions.                      Kegel Exercises  What are Kegel exercises?  Kegel exercises help strengthen your pelvic floor muscles. These muscles help support the urethra, bladder, vagina, penis, uterus, and rectum. They are used when you urinate or have bowel movements. They are also used during sex, for example, during orgasm.   These exercises can be helpful to both women and men.  Why should I do Kegel exercises?  Strengthening the pelvic floor muscles may lessen and even prevent some bladder control problems, such as leaking of urine from the bladder. They may also help if you have a loss of control over bowel movements. In addition, they can improve sexual function. Kegels may help women who have uterine prolapse (fallen uterus) or pain during sex.  What causes bladder control problems?   Weakened pelvic muscles may allow urine to leak from the bladder. The muscles may be weakened by:  childbirth   aging   chronic coughing   diabetes   obesity   frequent heavy lifting over time   injury   previous pelvic surgery in women   loss of the female hormone estrogen after women go through menopause   prostate surgery in men  How do I do Kegel exercises?   You can feel  the muscles that need to be exercised by squeezing the muscles in your genital area. You might find that it helps to pretend you are geovanni the pelvic muscles to stop a flow of urine or stop from passing gas or stool   Squeeze your pelvic muscles and hold the contraction for 3 to 5 seconds. Do this 10 to 20 times. Let the muscles relax completely between contractions.   Do these sets of contractions 3 to 4 times a day. You won t get as much help from the exercises if you do them less often than this.  You can do Kegel exercises anywhere: while sitting at a desk, waiting for a bus, washing dishes, driving a car, waiting in line, or watching television. No one will know you are doing them.  Don t do these exercises while you are urinating or having a bowel movement.  If you have a bladder control problem from weakened pelvic muscles and start regularly doing the Kegels, you may have less leakage of urine in just a few weeks. However, you may not notice a lot of improvement until after 3 to 6 months of daily exercises. You should keep doing Kegels every day to keep the pelvic muscles strong even if you do not feel a difference.  Women may want to ask their healthcare provider about cones that can be used to help strengthen the pelvic floor muscles. The cones come in different sizes. You may start with a large cone. You put it into your vagina and try to hold it in place for 15 minutes a couple of times a day by geovanni your pelvic or vaginal muscles. When this is easy for you to do, you may then try keeping a smaller cone in place. Your healthcare provider can order the cones from a surgical supply company.  Kegel exercises may be assisted with a probe that causes contractions of the pelvic floor muscles. The probe is put into the vagina and stimulates contractions with a mild electric current. This is called neuromuscular electrical stimulation (NMES). You can further strengthen your muscles by squeezing them  as they contract in response to the probe. However, this is not necessarily better than the basic, unassisted Kegel exercises.  How do Kegel exercises improve sexual function?   Kegel exercises can improve vaginal and rectal muscle tone and sensation. This can help women be more sexually responsive and may help improve orgasms. The exercises can also improve a man's sexual response and help delay ejaculation during intercourse. However, the chief benefit of Kegel exercises is that they may help stop the leaking of urine.    Published by [employer].  This content is reviewed periodically and is subject to change as new health information becomes available. The information is intended to inform and educate and is not a replacement for medical evaluation, advice, diagnosis or treatment by a healthcare professional.            Follow-ups after your visit        Additional Services     ORTHO  REFERRAL       [facility] is referring you to the Orthopedic  Services at [facility].       The  Representative will assist you in the coordination of your Orthopedic and Musculoskeletal Care as prescribed by your physician.    The  Representative will call you within 1 business day to help schedule your appointment, or you may contact the  Representative at:    All areas ~ [phone]     Type of Referral : [facility] / [facility]       Timeframe requested: Within 2 weeks    Coverage of these services is subject to the terms and limitations of your health insurance plan.  Please call member services at your health plan with any benefit or coverage questions.      If X-rays, CT or MRI's have been performed, please contact the facility where they were done to arrange for , prior to your scheduled appointment.  Please bring this referral request to your appointment and present it to your specialist.            VASCULAR  SURGERY REFERRAL       Your provider has referred you to: **Vascular [facility] [phone] - Varicose Veins & None - Please Order Appropriate Testing   [email or web address]    Please be aware that coverage of these services is subject to the terms and limitations of your health insurance plan.  Call member services at your health plan with any benefit or coverage questions.      Please bring the following with you to your appointment:    (1) Any X-Rays, CTs or MRIs which have been performed.  Contact the facility where they were done to arrange for  prior to your scheduled appointment.    (2) List of current medications   (3) This referral request   (4) Any documents/labs given to you for this referral                  Follow-up notes from your care team     Return in about 6 months (around 9/1/2018) for cholesterol recheck, depression/anxiety follow up.      Future tests that were ordered for you today     Open Future Orders        Priority Expected Expires Ordered    DX Hip/Pelvis/Spine Routine  3/1/2019 3/1/2018            Who to contact     If you have questions or need follow up information about today's clinic visit or your schedule please contact [facility] directly at [phone].  Normal or non-critical lab and imaging results will be communicated to you by MyChart, letter or phone within 4 business days after the clinic has received the results. If you do not hear from us within 7 days, please contact the clinic through [employer]hart or phone. If you have a critical or abnormal lab result, we will notify you by phone as soon as possible.  Submit refill requests through Dials or call your pharmacy and they will forward the refill request to us. Please allow 3 business days for your refill to be completed.          Additional Information About Your Visit        Dials Information     Dials lets you send messages to your doctor, view your test  "results, renew your prescriptions, schedule appointments and more. To sign up, go to [email or web address] . Click on \"Log in\" on the left side of the screen, which will take you to the Welcome page. Then click on \"Sign up Now\" on the right side of the page.     You will be asked to enter the access code listed below, as well as some personal information. Please follow the directions to create your username and password.     Your access code is: [email or web address]  Expires: 2018 11:43 AM     Your access code will  in 90 days. If you need help or a new code, please call your [facility] clinic or [phone].        Care EveryWhere ID     This is your Care EveryWhere ID. This could be used by other organizations to access your [facility] medical records  [id number]        Your Vitals Were     Pulse Temperature Height Pulse Oximetry BMI (Body Mass Index)       60 98.3  F (36.8  C) (Oral) 5' 7.25\" (1.708 m) 97% 30.1 kg/m2        Blood Pressure from Last 3 Encounters:   18 112/80   17 118/84   08 108/60    Weight from Last 3 Encounters:   18 193 lb 9.6 oz (87.8 kg)   17 197 lb (89.4 kg)   08 184 lb (83.5 kg)              We Performed the Following     CBC with platelets     Comprehensive metabolic panel     DEPRESSION ACTION PLAN (DAP)     Hemoglobin A1c     HPV High Risk Types DNA Cervical     JUST IN CASE     Lipid panel reflex to direct LDL Fasting     ORTHO  REFERRAL     Pap imaged thin layer screen with HPV - recommended age 30 - 65 years (select HPV order below)     TSH with free T4 reflex     VASCULAR SURGERY REFERRAL          Today's Medication Changes          These changes are accurate as of 3/1/18 11:43 AM.  If you have any questions, ask your nurse or doctor.               Start taking these medicines.        Dose/Directions    oxybutynin 10 MG 24 hr tablet   Commonly known as:  DITROPAN XL   Used for:  Urge incontinence of urine   Started by:  Oscar " Gabriela QUINONES MD        Dose:  10-20 mg   Take 1-2 tablets (10-20 mg) by mouth daily   Quantity:  60 tablet   Refills:  5            Where to get your medicines      These medications were sent to [facility] [place] [facility] [address] [place] [address]  [address] [facility] [address]     Phone:  [phone]     valACYclovir 500 MG tablet         These medications were sent to [facility] - [address]  [address] [facility] [address], [facility] [address]     Phone:  [phone]     citalopram 40 MG tablet    oxybutynin 10 MG 24 hr tablet    simvastatin 40 MG tablet                Primary Care Provider Office Phone # Fax #    Gabriela Moore [phone]       [address] [place] [address]        Equal Access to Services     [facility]: Hadii aad ku hadasho Soomaali, waaxda luqadaha, qaybta kaalmada adeegyada, waxay idiin hayaan adehemal chandler . So [facility] [phone].    ATENCIÓN: Si habla español, tiene a randhawa disposición servicios gratuitos de asistencia lingüística. Llame al [phone].    We comply with applicable federal civil rights laws and [place] laws. We do not discriminate on the basis of race, color, national origin, age, disability, sex, sexual orientation, or gender identity.            Thank you!     Thank you for choosing [facility]  for your care. Our goal is always to provide you with excellent care. Hearing back from our patients is one way we can continue to improve our services. Please take a few minutes to complete the written survey that you may receive in the mail after your visit with us. Thank you!             Your Updated Medication List - Protect others around you: Learn how to safely use, store and throw away your medicines at www.disposemymeds.org.          This list is accurate as of 3/1/18 11:43 AM.  Always use your most recent med list.                    Brand Name Dispense Instructions for use Diagnosis    CALCIUM 600+D 600-200 MG-UNIT Tabs   Generic drug:  calcium carbonate-vitamin D      Take 1 tablet by mouth    Encounter for routine adult medical exam with abnormal findings       citalopram 40 MG tablet    celeXA    90 tablet    Take 1 tablet by mouth  daily    Moderate episode of recurrent major depressive disorder (H)       FISH OIL PO       Encounter for routine adult medical exam with abnormal findings       oxybutynin 10 MG 24 hr tablet    DITROPAN XL    60 tablet    Take 1-2 tablets (10-20 mg) by mouth daily    Urge incontinence of urine       simvastatin 40 MG tablet    ZOCOR    90 tablet    one tab every evening    Hyperlipidemia LDL goal <130       valACYclovir 500 MG tablet    VALTREX    6 tablet    Take 1 tablet (500 mg) by mouth 2 times daily    Genital herpes simplex, unspecified site

## 2018-03-01 NOTE — PATIENT INSTRUCTIONS
"Establish an exercise regimen. Activity goal: 45 minutes 5 days a week. New exercise routine: cardiovascular workout on exercise equipment, walking and weightlifting. Diet regimen was discussed and plan is self-directed dieting: reduce calories, reduce portions, reduce processed  carbs, increase fruits/vegetables and avoid sweets and supervised diet program. Avoid artificial sweeteners and \"diet\" drinks and sodas.       Dr. Moore's recommended diet to rev-up metabolism for weight loss:   eat every 2-3 hours.   Lean protein = 2 egg whites, or turkey, chicken, fish, low fat, plain greek yogurt (try Fage 0% brand).. Low-glycemic fruits = strawberries, blueberries, raspberries, blackberries, nectarines, grapefruit, oranges,  apples.     2 oz. Lean protein + 1/2 cup  low-glycemic fruit --- breakfast and midmorning snack     2 oz. Lean protein + 1/4 cup whole grain rice or potato + 1 cup green veggie - lunch, dinner , and afternoon snack.     Evening snack : 1/2 cup of low glycemic fruit or an apple.      If you can't kill it and grill it, or pick it off a plant and eat it - DON'T EAT IT!     For occasional pasta - try Barilla Plus - has protein in it. - keep to 1/2 cup instead of your 1/4 cup of rice or potato.     If 5-6 small meals/day - too labor intensive, then keep to 3 meals plus 1 snack with 3 oz lean protein per meal with 2- 3 oz protein in your snack.        Preventive Health Recommendations  Female Ages 50 - 64    Yearly exam: See your health care provider every year in order to  o Review health changes.   o Discuss preventive care.    o Review your medicines if your doctor has prescribed any.      Get a Pap test every three years (unless you have an abnormal result and your provider advises testing more often).    If you get Pap tests with HPV test, you only need to test every 5 years, unless you have an abnormal result.     You do not need a Pap test if your uterus was removed (hysterectomy) and you " have not had cancer.    You should be tested each year for STDs (sexually transmitted diseases) if you're at risk.     Have a mammogram every 1 to 2 years.    Have a colonoscopy at age 50, or have a yearly FIT test (stool test). These exams screen for colon cancer.      Have a cholesterol test every 5 years, or more often if advised.    Have a diabetes test (fasting glucose) every three years. If you are at risk for diabetes, you should have this test more often.     If you are at risk for osteoporosis (brittle bone disease), think about having a bone density scan (DEXA).    Shots: Get a flu shot each year. Get a tetanus shot every 10 years.    Nutrition:     Eat at least 5 servings of fruits and vegetables each day.    Eat whole-grain bread, whole-wheat pasta and brown rice instead of white grains and rice.    Talk to your provider about Calcium and Vitamin D.     Lifestyle    Exercise at least 150 minutes a week (30 minutes a day, 5 days a week). This will help you control your weight and prevent disease.    Limit alcohol to one drink per day.    No smoking.     Wear sunscreen to prevent skin cancer.     See your dentist every six months for an exam and cleaning.    See your eye doctor every 1 to 2 years.               Thank you for choosing [facility]  for your Health Care. It was a pleasure seeing you at your visit today. Please contact us with any questions or concerns you may have.                   Gabriela Moore MD                                  To reach your [facility] care team after hours call:   [phone]    Our clinic hours are:     Monday- 7:30 am - 7:00 pm                             Tuesday through Friday- 7:30 am - 5:00 pm                                        Saturday- 8:00 am - 12:00 pm                  Phone:  [phone]    Our pharmacy hours are:     Monday  8:00 am to 7:00 pm      Tuesday through Friday 8:00am to 6:00pm                        " Saturday - 9:00 am to 1:00 pm      Sunday : Closed.              Phone:  [phone]      There is also information available at our web site:  [email or web address]    If your provider ordered any lab tests and you do not receive the results within 10 business days, please call the clinic.    If you need a medication refill please contact your pharmacy.  Please allow 2 business days for your refill to be completed.    Our clinic offers telephone visits and e visits.  Please ask one of your team members to explain more.      Use [employer] (secure email communication and access to your chart) to send your primary care provider a message or make an appointment. Ask someone on your Team how to sign up for [employer].                 URINARY INCONTINENCE [female]  Urinary Incontinence means loss of control of the bladder. This may be due to infection, medicine, aging, poor pelvic muscle tone, bladder spasms, obesity or urinary retention.    STRESS INCONTINENCE is a special form of incontinence in women where the muscles and ligaments supporting the bladder have been stretched by pregnancy. Coughing, sneezing or laughing increases bladder pressure and may cause loss of urine.  URGE INCONTINENCE (also called \"overactive bladder\") is a sudden urge to urinate even though there may not be much urine in the bladder. The need to urinate often during the night is common. It is due to bladder spasms. It is a common cause of incontinence in both men and women.  Treatment of this condition depends on the cause. Infections of the bladder are treated with antibiotics. Urinary retention is treated with a bladder catheter. Stress incontinence can be treated with special exercises to strengthen muscles around the bladder, although sometimes surgery is required.    HOME CARE:  1) Avoid foods and drinks that may irritate the bladder (alcohol, caffeine, artificial sweeteners, carbonated drinks, chocolate, citrus fruits and acidic fruits and " juices).  2) Limit fluid intake to 4 to 6 cups a day, unless out in the hot weather.   3) Lose weight if you are overweight. This will reduce your symptoms.  4) If your problem is stress incontinence, talk to your doctor about Kegel exercises to strengthen the muscles around the bladder.  5) If necessary, wear absorbent pads to catch urine.  6) Bathe daily to maintain good hygiene and prevent odors and skin irritation from contact with urine.  7) If an antibiotic was prescribed to treat a bladder infection, be sure to take it until finished, even if you are feeling better before then.  8) If a bladder catheter was left in place, it is important to keep the bacteria from getting into the collection bag. Use a leg band to secure the drainage tube, so it does not pull on the catheter. Drain the collection bag when it becomes full using the drain spout at the bottom of the bag. Do not disconnect the bag from the catheter.  FOLLOW UP with your doctor or this facility as advised. If a catheter was left in place, it will need to be removed or changed soon.    GET PROMPT MEDICAL ATTENTION if any of the following occur:  -- Fever of 99.5 F (orally)  -- Bladder pain or fullness  -- Abdominal swelling, nausea or vomiting or back pain  -- Catheter falls out or stops draining (no urine from catheter in six hours)  -- Weakness, dizziness or fainting    [id number] [place] [id number]. All rights reserved. This information is not intended as a substitute for professional medical care. Always follow your healthcare professional's instructions.                      Kegel Exercises  What are Kegel exercises?  Kegel exercises help strengthen your pelvic floor muscles. These muscles help support the urethra, bladder, vagina, penis, uterus, and rectum. They are used when you urinate or have bowel movements. They are also used during sex, for example, during orgasm.   These exercises can be helpful to  both women and men.  Why should I do Kegel exercises?  Strengthening the pelvic floor muscles may lessen and even prevent some bladder control problems, such as leaking of urine from the bladder. They may also help if you have a loss of control over bowel movements. In addition, they can improve sexual function. Kegels may help women who have uterine prolapse (fallen uterus) or pain during sex.  What causes bladder control problems?   Weakened pelvic muscles may allow urine to leak from the bladder. The muscles may be weakened by:  childbirth   aging   chronic coughing   diabetes   obesity   frequent heavy lifting over time   injury   previous pelvic surgery in women   loss of the female hormone estrogen after women go through menopause   prostate surgery in men  How do I do Kegel exercises?   You can feel the muscles that need to be exercised by squeezing the muscles in your genital area. You might find that it helps to pretend you are geovanni the pelvic muscles to stop a flow of urine or stop from passing gas or stool   Squeeze your pelvic muscles and hold the contraction for 3 to 5 seconds. Do this 10 to 20 times. Let the muscles relax completely between contractions.   Do these sets of contractions 3 to 4 times a day. You won t get as much help from the exercises if you do them less often than this.  You can do Kegel exercises anywhere: while sitting at a desk, waiting for a bus, washing dishes, driving a car, waiting in line, or watching television. No one will know you are doing them.  Don t do these exercises while you are urinating or having a bowel movement.  If you have a bladder control problem from weakened pelvic muscles and start regularly doing the Kegels, you may have less leakage of urine in just a few weeks. However, you may not notice a lot of improvement until after 3 to 6 months of daily exercises. You should keep doing Kegels every day to keep the pelvic muscles strong even if you do not  feel a difference.  Women may want to ask their healthcare provider about cones that can be used to help strengthen the pelvic floor muscles. The cones come in different sizes. You may start with a large cone. You put it into your vagina and try to hold it in place for 15 minutes a couple of times a day by geovanni your pelvic or vaginal muscles. When this is easy for you to do, you may then try keeping a smaller cone in place. Your healthcare provider can order the cones from a surgical supply company.  Kegel exercises may be assisted with a probe that causes contractions of the pelvic floor muscles. The probe is put into the vagina and stimulates contractions with a mild electric current. This is called neuromuscular electrical stimulation (NMES). You can further strengthen your muscles by squeezing them as they contract in response to the probe. However, this is not necessarily better than the basic, unassisted Kegel exercises.  How do Kegel exercises improve sexual function?   Kegel exercises can improve vaginal and rectal muscle tone and sensation. This can help women be more sexually responsive and may help improve orgasms. The exercises can also improve a man's sexual response and help delay ejaculation during intercourse. However, the chief benefit of Kegel exercises is that they may help stop the leaking of urine.    Published by [employer].  This content is reviewed periodically and is subject to change as new health information becomes available. The information is intended to inform and educate and is not a replacement for medical evaluation, advice, diagnosis or treatment by a healthcare professional.

## 2018-03-02 ASSESSMENT — PATIENT HEALTH QUESTIONNAIRE - PHQ9: SUM OF ALL RESPONSES TO PHQ QUESTIONS 1-9: 4

## 2018-03-02 ASSESSMENT — ANXIETY QUESTIONNAIRES: GAD7 TOTAL SCORE: 2

## 2018-03-02 NOTE — NURSING NOTE
Patient brought in form from [employer] regarding Physician Results from physical appointment.  Form completed and faxed to [phone].  Mailed original form to patient's home address and sent copy to bj Palacios CMA

## 2018-03-05 ENCOUNTER — TELEPHONE (OUTPATIENT)
Dept: FAMILY MEDICINE | Facility: CLINIC | Age: 63
End: 2018-03-05

## 2018-03-05 DIAGNOSIS — N39.41 URGE INCONTINENCE OF URINE: ICD-10-CM

## 2018-03-05 DIAGNOSIS — R39.15 URINARY URGENCY: Primary | ICD-10-CM

## 2018-03-05 LAB
COPATH REPORT: NORMAL
PAP: NORMAL

## 2018-03-05 NOTE — TELEPHONE ENCOUNTER
Reason for Call:  Other prescription    Detailed comments: Patient has questions regarding oxybutynin (DITROPAN XL) 10 MG 24 hr tablet patient states that the co pay on the medication is $139 and she wanted to know if there was a cheaper alternative     Phone Number Patient can be reached at: Home number on file [phone] (home)    Best Time: anytime     Can we leave a detailed message on this number? YES    Call taken on 3/5/2018 at 11:17 AM by Negra Hong

## 2018-03-06 LAB
FINAL DIAGNOSIS: NORMAL
HPV HR 12 DNA CVX QL NAA+PROBE: NEGATIVE
HPV16 DNA SPEC QL NAA+PROBE: NEGATIVE
HPV18 DNA SPEC QL NAA+PROBE: NEGATIVE
SPECIMEN DESCRIPTION: NORMAL
SPECIMEN SOURCE CVX/VAG CYTO: NORMAL

## 2018-03-06 NOTE — TELEPHONE ENCOUNTER
Called # below     Advised pt the cost could be from her having a deducible not being met yet     Please review and advise if there is a cheaper alternative, if there is any     Thank you     Madeline Lewis RN, BSN  [place] [facility]

## 2018-03-06 NOTE — TELEPHONE ENCOUNTER
Still waiting for response from .    Roseline Whitehead, BS, RN, N  [facility]) [phone]

## 2018-03-07 RX ORDER — OXYBUTYNIN CHLORIDE 5 MG/1
10 TABLET ORAL 2 TIMES DAILY
Qty: 120 TABLET | Refills: 3 | Status: SHIPPED | OUTPATIENT
Start: 2018-03-07 | End: 2018-05-14

## 2018-03-07 NOTE — TELEPHONE ENCOUNTER
The twice daily medication might be cheaper , if her deductible hasn't been met.   Try oxybutynin IR twice daily - sent to pt's pharmacy - [facility] [place].  . Please inform patient. Have pt look on [email or web address] as well.

## 2018-03-07 NOTE — TELEPHONE ENCOUNTER
Non detailed message left for patient to return call.   Cintia Leblanc, RN  [employer]

## 2018-03-08 ENCOUNTER — TELEPHONE (OUTPATIENT)
Dept: BONE DENSITY | Facility: CLINIC | Age: 63
End: 2018-03-08

## 2018-03-08 NOTE — TELEPHONE ENCOUNTER
Detailed message left for patient with JV note below.    Roseline Whitehead, MESSI, RN, N  [facility]) [phone]

## 2018-04-02 ENCOUNTER — RADIANT APPOINTMENT (OUTPATIENT)
Dept: BONE DENSITY | Facility: CLINIC | Age: 63
End: 2018-04-02
Attending: FAMILY MEDICINE
Payer: COMMERCIAL

## 2018-04-02 DIAGNOSIS — Z82.62 FAMILY HISTORY OF OSTEOPOROSIS: ICD-10-CM

## 2018-04-02 DIAGNOSIS — Z78.0 ASYMPTOMATIC POSTMENOPAUSAL STATUS: ICD-10-CM

## 2018-04-02 DIAGNOSIS — R29.890 LOSS OF HEIGHT: ICD-10-CM

## 2018-04-02 PROCEDURE — 77080 DXA BONE DENSITY AXIAL: CPT | Performed by: INTERNAL MEDICINE

## 2018-05-14 ENCOUNTER — TELEPHONE (OUTPATIENT)
Dept: FAMILY MEDICINE | Facility: CLINIC | Age: 63
End: 2018-05-14

## 2018-05-14 ENCOUNTER — TRANSFERRED RECORDS (OUTPATIENT)
Dept: HEALTH INFORMATION MANAGEMENT | Facility: CLINIC | Age: 63
End: 2018-05-14

## 2018-05-14 ENCOUNTER — NURSE TRIAGE (OUTPATIENT)
Dept: NURSING | Facility: CLINIC | Age: 63
End: 2018-05-14

## 2018-05-14 ENCOUNTER — OFFICE VISIT (OUTPATIENT)
Dept: FAMILY MEDICINE | Facility: CLINIC | Age: 63
End: 2018-05-14
Payer: COMMERCIAL

## 2018-05-14 VITALS
TEMPERATURE: 98.5 F | HEART RATE: 90 BPM | WEIGHT: 196.38 LBS | DIASTOLIC BLOOD PRESSURE: 72 MMHG | OXYGEN SATURATION: 99 % | BODY MASS INDEX: 30.82 KG/M2 | HEIGHT: 67 IN | SYSTOLIC BLOOD PRESSURE: 108 MMHG

## 2018-05-14 DIAGNOSIS — Z81.8 FAMILY HISTORY OF BIPOLAR DISORDER: ICD-10-CM

## 2018-05-14 DIAGNOSIS — F33.41 DEPRESSION, MAJOR, RECURRENT, IN PARTIAL REMISSION (H): Primary | ICD-10-CM

## 2018-05-14 PROCEDURE — 99214 OFFICE O/P EST MOD 30 MIN: CPT | Performed by: PHYSICIAN ASSISTANT

## 2018-05-14 ASSESSMENT — ANXIETY QUESTIONNAIRES
5. BEING SO RESTLESS THAT IT IS HARD TO SIT STILL: SEVERAL DAYS
7. FEELING AFRAID AS IF SOMETHING AWFUL MIGHT HAPPEN: MORE THAN HALF THE DAYS
GAD7 TOTAL SCORE: 12
2. NOT BEING ABLE TO STOP OR CONTROL WORRYING: MORE THAN HALF THE DAYS
IF YOU CHECKED OFF ANY PROBLEMS ON THIS QUESTIONNAIRE, HOW DIFFICULT HAVE THESE PROBLEMS MADE IT FOR YOU TO DO YOUR WORK, TAKE CARE OF THINGS AT HOME, OR GET ALONG WITH OTHER PEOPLE: SOMEWHAT DIFFICULT
1. FEELING NERVOUS, ANXIOUS, OR ON EDGE: MORE THAN HALF THE DAYS
6. BECOMING EASILY ANNOYED OR IRRITABLE: MORE THAN HALF THE DAYS
3. WORRYING TOO MUCH ABOUT DIFFERENT THINGS: MORE THAN HALF THE DAYS

## 2018-05-14 ASSESSMENT — PATIENT HEALTH QUESTIONNAIRE - PHQ9: 5. POOR APPETITE OR OVEREATING: SEVERAL DAYS

## 2018-05-14 NOTE — PROGRESS NOTES
"  SUBJECTIVE:   Inessa Castaneda is a 63 year old female who presents to clinic today for the following health issues:     Depression and Anxiety Follow-Up  Inessa has a PMH significant for mental health symptoms for which she is prescribed citalopram 40 mg. She has been on the medication since 2010 and reports she was on different SSRI's until settling on Celexa. She reports that she had been on Lexapro (2004), Wellbutrin (1999), Paxil (1998), Prozac (1999), Zoloft (1999), Effexor (2001) and notes that they did not work very well for her, some of which caused weight gain (unsure which ones). She has tried to discontinue the citalopram over time and states that she has negative thoughts, flat affect, and some anger outbursts when off of the medication. She has a family history of Bipolar disorder in two of her sisters. Inessa reports that in the last month she has had increased symptoms of depression and describes she feels \"like life is dragging along\". She denies specific life events or stressors that onset her current depression. She reports that she often has highs and lows but notes that her current low has been very low and has lasted longer than previously. She reports that she often isolates herself when she has these symptoms. Inessa has attended therapy in the past but has not followed up with any therapy in recent date. She states that she has adequate support if she needs it and denies any suicidal ideation.     Status since last visit: Worsened (last month)    Other associated symptoms:None    Complicating factors:     Significant life event: No      Current substance abuse: None    PHQ-9 SCORE 2/16/2018 3/1/2018 5/14/2018   Total Score 6 4 17     MIGEL-7 SCORE 2/16/2018 3/1/2018 5/14/2018   Total Score 3 2 12     PHQ-9  English  PHQ-9   Any Language  MIGEL-7  Suicide Assessment Five-step Evaluation and Treatment (SAFE-T)    Problem list and histories reviewed & adjusted, as indicated.  Additional history: as " documented    Patient Active Problem List   Diagnosis     Depression, major, recurrent, in partial remission (H)     Hyperlipidemia LDL goal <130     Adenomatous colon polyp-at age 52, then normal colonoscopy at age 55 and then at age 60- needs repeat colonoscopy 2021      Urinary urgency     DVT (deep venous thrombosis) (H)     Genital herpes simplex, unspecified site     Cystocele     Family history of thyroid disorder- mother and sister     Non morbid obesity due to excess calories     Asymptomatic postmenopausal status     Urge incontinence of urine     Family history of osteoporosis- mother with signif. disease      Past Surgical History:   Procedure Laterality Date     BREAST BIOPSY, RT/LT Right ?     benign     BUNIONECTOMY Left 2008    Dr. Alberto Laws - left foot medial and lateral with left 2nd toe surgery      SLING BLADDER SUSPENSION WITH FASCIA SULAIMAN  2008    - Dr. Emanuel Paz - had DVT afterward      XR ANKLE SURGERY NIXON LEFT  2008    Dr. Alberto Laws -arthroscopy ankle s/p fall - not fused        Social History   Substance Use Topics     Smoking status: Never Smoker     Smokeless tobacco: Never Used     Alcohol use 0.0 oz/week     0 Standard drinks or equivalent per week      Comment: 2 times/month  - 1-2 glasses of wine      Family History   Problem Relation Age of Onset     CEREBROVASCULAR DISEASE Mother 88     tiny strokes - TIA -  at age 90     Hypertension Mother      Alzheimer Disease Father 81      age 89     HEART DISEASE Father 63     s/p MI - first at age 63 , then s/p 4 vessel CABG     MENTAL ILLNESS Sister      bipolar disorder -with heroin addiction - on methadone - no contact for years      MENTAL ILLNESS Sister      bipolar - 2 with hoarding tendencies      MENTAL ILLNESS Brother       after fall while drunk - alcoholic      Vascular Disease Brother      dissecting thoracic aortic aneurysm      Hepatitis Brother      Hep C - treated     MENTAL  "ILLNESS Sister      hoarding tendencies     Substance Abuse Sister      Opiates      Attention Deficit Disorder Daughter      Substance Abuse Daughter      adderall that was not prescribed         Current Outpatient Prescriptions   Medication Sig Dispense Refill     calcium carbonate-vitamin D (CALCIUM 600+D) 600-200 MG-UNIT TABS Take 1 tablet by mouth       citalopram (CELEXA) 40 MG tablet Take 1 tablet by mouth  daily 90 tablet 3     Omega-3 Fatty Acids (FISH OIL PO)        simvastatin (ZOCOR) 40 MG tablet one tab every evening 90 tablet 1     valACYclovir (VALTREX) 500 MG tablet Take 1 tablet (500 mg) by mouth 2 times daily 6 tablet 11     Allergies   Allergen Reactions     Sulfa Drugs      rash       Reviewed and updated as needed this visit by clinical staff  Tobacco  Allergies  Meds  Problems  Med Hx  Surg Hx  Fam Hx  Soc Hx        Reviewed and updated as needed this visit by Provider  Tobacco  Allergies  Meds  Problems  Med Hx  Surg Hx  Fam Hx  Soc Hx          ROS:  Constitutional, HEENT, cardiovascular, pulmonary, GI, , musculoskeletal, neuro, skin, endocrine and psych systems are negative, except as otherwise noted.    This document serves as a record of the services and decisions personally performed and made by Doris Cruz PA-C. It was created on her behalf by Itz Rogers, a trained medical scribe. The creation of this document is based on the provider's statements to the medical scribe.  Itz Rogers 3:17 PM May 14, 2018    OBJECTIVE:   /72  Pulse 90  Temp 98.5  F (36.9  C) (Oral)  Ht 5' 7.25\" (1.708 m)  Wt 196 lb 6 oz (89.1 kg)  SpO2 99%  BMI 30.53 kg/m2  Body mass index is 30.53 kg/(m^2).  GENERAL: healthy, alert and no distress  RESP: lungs clear to auscultation - no rales, rhonchi or wheezes  CV: regular rate and rhythm, normal S1 S2, no S3 or S4, no murmur, click or rub, no peripheral edema and peripheral pulses strong  SKIN: no suspicious lesions or " rashes  PSYCH: mentation appears normal, affect normal/bright    Diagnostic Test Results:  none     ASSESSMENT/PLAN:   Inessa was seen today for depression.    Diagnoses and all orders for this visit:    Depression, major, recurrent, in partial remission (H), Family history of bipolar disorder  Referral provided to psychiatry for further treatment and evaluation. Advised that due to patient's family history of bipolar disorder that it is possible that it is possible she could also be affected by the disorder to some degree. Genesight Testing administered today, panels selected for Psychotropics and MTHFR will update patient with results. Provided patient information regarding [facility] and advised patient to follow up with some form of therapy. Recommended that patient call back with list of previous mental health medications.  -     MENTAL HEALTH REFERRAL  - Adult; Psychiatry and Medication Management; Psychiatry; [facility] [place] [facility]: [facility] [phone].  Medication management & future refills will be returned to [facility] PCP upon completion of evaluation; We crow...    Greater than 25 minutes were spent with the patient. The majority of this time was coordinating care and counseling regarding the above diagnosis .    The information in this document, created by the medical scribe for me, accurately reflects the services I personally performed and the decisions made by me. I have reviewed and approved this document for accuracy prior to leaving the patient care area.  May 14, 2018 3:17 PM    Doris Cruz PA-C  [facility] [place]

## 2018-05-14 NOTE — MR AVS SNAPSHOT
After Visit Summary   5/14/2018    Inessa Castaneda    MRN: [id number]           Patient Information     Date Of Birth          1955        Visit Information        Provider Department      5/14/2018 3:00 PM Doris Cruz PA-C [facility]        Today's Diagnoses     Depression, major, recurrent, in partial remission (H)    -  1    Family history of bipolar disorder          Care Instructions    Call triage nurses and give summary of medications tried & reactions          Follow-ups after your visit        Additional Services     MENTAL HEALTH REFERRAL  - Adult; Psychiatry and Medication Management; Psychiatry; G: [facility] Psychiatry Service [phone].  Medication management & future refills will be returned to G PCP upon completion of evaluation; We crow...       All scheduling is subject to the client's specific insurance plan & benefits, provider/location availability, and provider clinical specialities.  Please arrive 15 minutes early for your first appointment and bring your completed paperwork.    Please be aware that coverage of these services is subject to the terms and limitations of your health insurance plan.  Call member services at your health plan with any benefit or coverage questions.                            Follow-up notes from your care team     Return in about 4 weeks (around 6/11/2018) for with psychiatry.      Your next 10 appointments already scheduled     Sep 10, 2018  9:45 AM CDT   Office Visit with Gabriela Moore MD   [facility] ([facility])    [address] [place] [address] [id number] [phone]           Bring a current list of meds and any records pertaining to this visit. For Physicals, please bring immunization records and any forms needing to be filled out. Please arrive 10 minutes early to complete paperwork.              Who to contact     If you have questions or  "need follow up information about today's clinic visit or your schedule please contact [facility] directly at [phone].  Normal or non-critical lab and imaging results will be communicated to you by [employer], letter or phone within 4 business days after the clinic has received the results. If you do not hear from us within 7 days, please contact the clinic through [employer] or phone. If you have a critical or abnormal lab result, we will notify you by phone as soon as possible.  Submit refill requests through [employer] or call your pharmacy and they will forward the refill request to us. Please allow 3 business days for your refill to be completed.          Additional Information About Your Visit        [employer] Information     [employer] lets you send messages to your doctor, view your test results, renew your prescriptions, schedule appointments and more. To sign up, go to [email or web address] . Click on \"Log in\" on the left side of the screen, which will take you to the Welcome page. Then click on \"Sign up Now\" on the right side of the page.     You will be asked to enter the access code listed below, as well as some personal information. Please follow the directions to create your username and password.     Your access code is: [email or web address]  Expires: 2018 12:43 PM     Your access code will  in 90 days. If you need help or a new code, please call your [facility] clinic or [phone].        Care EveryWhere ID     This is your Care EveryWhere ID. This could be used by other organizations to access your [facility] medical records  [id number]        Your Vitals Were     Pulse Temperature Height Pulse Oximetry BMI (Body Mass Index)       90 98.5  F (36.9  C) (Oral) 5' 7.25\" (1.708 m) 99% 30.53 kg/m2        Blood Pressure from Last 3 Encounters:   18 108/72   18 112/80   17 118/84    Weight from Last 3 Encounters:   18 196 lb 6 oz (89.1 kg)   18 193 lb 9.6 oz " (87.8 kg)   01/09/17 197 lb (89.4 kg)              We Performed the Following     MENTAL HEALTH REFERRAL  - Adult; Psychiatry and Medication Management; Psychiatry; [facility]: [facility] Psychiatry Service [phone].  Medication management & future refills will be returned to G PCP upon completion of evaluation; We crow...          Today's Medication Changes          These changes are accurate as of 5/14/18  3:51 PM.  If you have any questions, ask your nurse or doctor.               Stop taking these medicines if you haven't already. Please contact your care team if you have questions.     oxybutynin 5 MG tablet   Commonly known as:  DITROPAN   Stopped by:  Doris Cruz PA-C                    Primary Care Provider Office Phone # Fax #    Gabriela Moore [phone]       [facility] [address]        Equal Access to Services     [facility]: Hadii aad ku hadasho Soomaali, waaxda luqadaha, qaybta kaalmada adeegyada, waxay anain hayaan olman chandler . So [facility] [phone].    ATENCIÓN: Si habla español, tiene a randhawa disposición servicios gratuitos de asistencia lingüística. Joanie al [phone].    We comply with applicable federal civil rights laws and [place] laws. We do not discriminate on the basis of race, color, national origin, age, disability, sex, sexual orientation, or gender identity.            Thank you!     Thank you for choosing [facility]  for your care. Our goal is always to provide you with excellent care. Hearing back from our patients is one way we can continue to improve our services. Please take a few minutes to complete the written survey that you may receive in the mail after your visit with us. Thank you!             Your Updated Medication List - Protect others around you: Learn how to safely use, store and throw away your medicines at www.disposemymeds.org.          This list is accurate as of 5/14/18   3:51 PM.  Always use your most recent med list.                   Brand Name Dispense Instructions for use Diagnosis    CALCIUM 600+D 600-200 MG-UNIT Tabs   Generic drug:  calcium carbonate-vitamin D      Take 1 tablet by mouth    Encounter for routine adult medical exam with abnormal findings       citalopram 40 MG tablet    celeXA    90 tablet    Take 1 tablet by mouth  daily    Moderate episode of recurrent major depressive disorder (H)       FISH OIL PO       Encounter for routine adult medical exam with abnormal findings       simvastatin 40 MG tablet    ZOCOR    90 tablet    one tab every evening    Hyperlipidemia LDL goal <130       valACYclovir 500 MG tablet    VALTREX    6 tablet    Take 1 tablet (500 mg) by mouth 2 times daily    Genital herpes simplex, unspecified site

## 2018-05-14 NOTE — TELEPHONE ENCOUNTER
"Pt noted no recent stressors or changes that have made their depression sx worse, this has been about a month now.     Pt has been taking the Citalopram 40mg daily.     Pt feels like when they are feeling this way, they want to isolate themselves. Pt walks and reads and has support if they need it.     Pt contracted for safety with triage, has no SI, SIB or HI.  Pt noted that at times they feel like \"like life is dragging along.\"     Scheduled pt for OV today with LP.     Advised pt to call with any further sx or concerns or go to ER or call 911 with any suicidal sx or concerns.The patient indicates understanding of these issues and agrees with the plan.    Amber Dickerson RN  [facility] Triage          "

## 2018-05-14 NOTE — TELEPHONE ENCOUNTER
[facility] Triage Call  Presenting Problem: From   Pt called.  Depression for the last month, is getting worse  , and no new situational  problems , &  having   Trouble sleeping   . Her  2 sisters have been DX with Bipolar Depression . No suicidal ideation reported .   Guideline Used: Depression - adult   Patient Recommendations/Teaching: Sent to  for appt = see provider in 24 hours .   .Chelsey Whatley RN [place] nurse advisors.          Reason for Disposition    [1] Depression AND [2] worsening (e.g.,sleeping poorly, less able to do activities of daily living)    Additional Information    Negative: Patient attempted suicide    Negative: Patient is threatening suicide now    Negative: Patient is threatening to kill a specific person    Negative: [1] Patient is very confused (disoriented, slurred speech) AND [2] no other adult (e.g., friend or family member) available    Negative: [1] Difficult to awaken or acting very confused (disoriented, slurred speech) AND [2] new onset    Negative: Sounds like a life-threatening emergency to the triager    Negative: Alcohol use, abuse or dependence, question or problem related to    Negative: Drug abuse or dependence, question or problem related to    Negative: Depression during the postpartum period (< 1 year since delivery)    Negative: [1] Depression AND [2] unable to do any of normal activities (e.g., self care, school, work; in comparison to baseline).    Negative: Bipolar disorder (manic depression)    Negative: Bizarre or confused behavior    Negative: Patient sounds very sick or weak to the triager    Protocols used: DEPRESSION-ADULT-

## 2018-05-14 NOTE — TELEPHONE ENCOUNTER
Pt would like to be seen for  Depression.  Please  Call to advise,  Pt has  Noticed that she has had more issues in recent weeks.      Thank you,   Eddie COREA   Central Scheduling  [phone]

## 2018-05-15 ASSESSMENT — PATIENT HEALTH QUESTIONNAIRE - PHQ9: SUM OF ALL RESPONSES TO PHQ QUESTIONS 1-9: 17

## 2018-05-15 ASSESSMENT — ANXIETY QUESTIONNAIRES: GAD7 TOTAL SCORE: 12

## 2018-05-18 ENCOUNTER — MEDICAL CORRESPONDENCE (OUTPATIENT)
Dept: HEALTH INFORMATION MANAGEMENT | Facility: CLINIC | Age: 63
End: 2018-05-18

## 2018-05-23 ENCOUNTER — TELEPHONE (OUTPATIENT)
Dept: FAMILY MEDICINE | Facility: CLINIC | Age: 63
End: 2018-05-23

## 2018-05-23 DIAGNOSIS — Z15.89 HETEROZYGOUS MTHFR MUTATION C677T: Primary | ICD-10-CM

## 2018-05-23 RX ORDER — PRENATAL VIT/IRON FUM/FOLIC AC 27MG-0.8MG
1 TABLET ORAL DAILY
Qty: 100 TABLET | Refills: 3 | COMMUNITY
Start: 2018-05-23 | End: 2018-09-10

## 2018-05-23 NOTE — TELEPHONE ENCOUNTER
Left detailed vm advising pt to call back regarding results.    Amber Dickerson RN  [facility]

## 2018-05-23 NOTE — TELEPHONE ENCOUNTER
"My apologies for the delay  As I just received the results late yesterday    I have reviewed her gene site results.  The majority of the medications that she was able to recall trying in the past fall in her yellow \"use with caution \"category including her current citalopram.  The specific clinical consideration for the citalopram is that it may have reduced efficacy based on her genetic predisposition.  Based on this finding I would advise changing her citalopram to either trazodone at night or to desvenlafaxine 50 mg once a day.  I am fairly certain that she has not tried the desvenlafaxine as it is a newer medication but please clarify with Inessa if she has taken trazodone in the past.  Please advise what she would like to do.    Additionally she carries 1 of the gene abnormalities for the MTHFR genotype and thus is likely deficient in folate.  I would advise that she take a prenatal vitamin in lieu of a multivitamin as this has an appropriate amount of folate.    Due to her strong family history for bipolar disorder I would still advised strongly for her to be evaluated by psychiatry.  Please assist patient with making this appointment if she desires.    I will have a copy of this report mailed to the patient, emailed to her as well and scanned into her chart.      Doris Cruz MS, PA-C        "

## 2018-05-23 NOTE — TELEPHONE ENCOUNTER
Reason for Call:  Request for results:    Name of test or procedure: GenSite    Date of test of procedure:     Location of the test or procedure: [facility]    OK to leave the result message on voice mail or with a family member? YES    Phone number Patient can be reached at:  Cell number on file:    Telephone Information:   Mobile [phone]       Additional comments:     Call taken on 5/23/2018 at 9:45 AM by Davida Ling

## 2018-05-24 NOTE — TELEPHONE ENCOUNTER
Patient notified notified by phone.  She said she has never tried Trazodone but that her sister takes it with Risperdal and it makes her feel like a zombie.  She has also never tried desvenlafaxine.  She would like to think about it and let us know which medication she wants to try.    She will call to schedule with psychiatry and call us if she needs help scheduling.    Results emailed and mailed to patient.      MESSI Nicole, RN, N  [facility]) [phone]

## 2018-06-04 ENCOUNTER — VIRTUAL VISIT (OUTPATIENT)
Dept: FAMILY MEDICINE | Facility: CLINIC | Age: 63
End: 2018-06-04
Payer: COMMERCIAL

## 2018-06-04 DIAGNOSIS — F33.41 DEPRESSION, MAJOR, RECURRENT, IN PARTIAL REMISSION (H): Primary | ICD-10-CM

## 2018-06-04 DIAGNOSIS — F33.1 MODERATE EPISODE OF RECURRENT MAJOR DEPRESSIVE DISORDER (H): ICD-10-CM

## 2018-06-04 DIAGNOSIS — Z15.89 HETEROZYGOUS MTHFR MUTATION C677T: ICD-10-CM

## 2018-06-04 PROCEDURE — 98967 PH1 ASSMT&MGMT NQHP 11-20: CPT | Performed by: PHYSICIAN ASSISTANT

## 2018-06-04 RX ORDER — TRAZODONE HYDROCHLORIDE 100 MG/1
100 TABLET ORAL
Qty: 90 TABLET | Refills: 0 | Status: SHIPPED | OUTPATIENT
Start: 2018-06-04 | End: 2018-08-09

## 2018-06-04 RX ORDER — CITALOPRAM HYDROBROMIDE 40 MG/1
20 TABLET ORAL DAILY
Qty: 90 TABLET | Refills: 3
Start: 2018-06-04 | End: 2018-08-09

## 2018-06-04 NOTE — PROGRESS NOTES
"Inessa Castaneda is a 63 year old female who is being evaluated via a telephone visit.      The patient has been notified of following:     \"This telephone visit will be conducted via a call between you and your physician/provider. We have found that certain health care needs can be provided without the need for a physical exam.  This service lets us provide the care you need with a short phone conversation.  If a prescription is necessary we can send it directly to your pharmacy.  If lab work is needed we can place an order for that and you can then stop by our lab to have the test done at a later time.    We will bill your insurance company for this service.  Please check with your medical insurance if this type of visit is covered. You may be responsible for the cost of this type of visit if insurance coverage is denied.  The typical cost is $30 (10min), $59 (11-20min) and $85 (21-30min).  Most often these visits are shorter than 10 minutes.    If during the course of the call the physician/provider feels a telephone visit is not appropriate, you will not be charged for this service.\"       Consent has been obtained for this service by care team member: yes.   See the scanned image in the medical record.    Inessa Castaneda complains of  Results  Discuss Gene Sight Results    I have reviewed and updated the patient's Past Medical History, Social History, Family History and Medication List.    ALLERGIES  Sulfa drugs    Shawna Barreto CMA   (MA signature)    Additional provider notes: Reviewed patient's gene site results.  Her current citalopram is in her yellow column and thus there are better medication options for her.  Discussed briefly transitioning to Pristiq, however, she is hesitant to try this due to potential insurance coverage concerns.  She is very interested in trying trazodone.  She thinks that 1 of her sisters who struggles with bipolar disorder is currently on this medication and it is working well.  " She is apprehensive to completely transition off of citalopram.    Assessment/Plan:  Inessa was seen today for results.    Diagnoses and all orders for this visit:    Depression, major, recurrent, in partial remission (H)  Moderate episode of recurrent major depressive disorder (H)  Decrease citalopram to 20 mg and start trazodone 100 mg once nightly.  Follow up in 4 weeks for recheck in the clinic.   Regarding the potential QT prolongation of citalopram and trazodone decreasing the citalopram should offset the trazodone effect on the QT interval.  We will watch this closely and check this with an an EKG at her 6 week follow-up appointment.  Recommended that she follow-up with me or Dr. Moore in 6 weeks.  -     citalopram (CELEXA) 40 MG tablet; Take 0.5 tablets (20 mg) by mouth daily  -     traZODone (DESYREL) 100 MG tablet; Take 1 tablet (100 mg) by mouth nightly as needed for sleep    Heterozygous MTHFR mutation C677T (H) - needs prenatal for folate supplementation  -Start prenatal vitamin for folate supplementation.          I have reviewed the note as documented above.  This accurately captures the substance of my conversation with the patient,      Doris Cruz, MS, PA-C      Total time of call between patient and provider was 13 minutes

## 2018-06-04 NOTE — TELEPHONE ENCOUNTER
I spoke with patient and she was agreeable to a telephone visit this afternoon.  I advised her of approximate cost of telephone visit.    Telephone visit scheduled for 4:40 pm this afternoon.      Patient verbalized understanding and agreed with plan.    MESSI Nicole, RN, N  [facility]) [phone]

## 2018-06-04 NOTE — MR AVS SNAPSHOT
After Visit Summary   6/4/2018    Inessa Castaneda    MRN: [id number]           Patient Information     Date Of Birth          1955        Visit Information        Provider Department      6/4/2018 4:40 PM Doris Cruz PA-C [facility]        Today's Diagnoses     Depression, major, recurrent, in partial remission (H)    -  1    Heterozygous MTHFR mutation C677T (H) - needs prenatal for folate supplementation        Moderate episode of recurrent major depressive disorder (H)           Follow-ups after your visit        Your next 10 appointments already scheduled     Sep 10, 2018  9:45 AM CDT   Office Visit with Gabriela Moore MD   [facility] ([facility])    [address] [place] [id number] [phone]           Bring a current list of meds and any records pertaining to this visit. For Physicals, please bring immunization records and any forms needing to be filled out. Please arrive 10 minutes early to complete paperwork.              Who to contact     If you have questions or need follow up information about today's clinic visit or your schedule please contact [facility] directly at [phone].  Normal or non-critical lab and imaging results will be communicated to you by MyChart, letter or phone within 4 business days after the clinic has received the results. If you do not hear from us within 7 days, please contact the clinic through [employer] or phone. If you have a critical or abnormal lab result, we will notify you by phone as soon as possible.  Submit refill requests through [employer] or call your pharmacy and they will forward the refill request to us. Please allow 3 business days for your refill to be completed.          Additional Information About Your Visit        MyChart Information     [employer] lets you send messages to your doctor, view your test results, renew your prescriptions,  "schedule appointments and more. To sign up, go to [email or web address] . Click on \"Log in\" on the left side of the screen, which will take you to the Welcome page. Then click on \"Sign up Now\" on the right side of the page.     You will be asked to enter the access code listed below, as well as some personal information. Please follow the directions to create your username and password.     Your access code is: [email or web address]  Expires: 2018  6:45 PM     Your access code will  in 90 days. If you need help or a new code, please call your [place] clinic or [phone].        Care EveryWhere ID     This is your Care EveryWhere ID. This could be used by other organizations to access your [place] medical records  [id number]         Blood Pressure from Last 3 Encounters:   18 108/72   18 112/80   17 118/84    Weight from Last 3 Encounters:   18 196 lb 6 oz (89.1 kg)   18 193 lb 9.6 oz (87.8 kg)   17 197 lb (89.4 kg)              Today, you had the following     No orders found for display         Today's Medication Changes          These changes are accurate as of 18  6:45 PM.  If you have any questions, ask your nurse or doctor.               Start taking these medicines.        Dose/Directions    traZODone 100 MG tablet   Commonly known as:  DESYREL   Used for:  Depression, major, recurrent, in partial remission (H)   Started by:  Doris Cruz PA-C        Dose:  100 mg   Take 1 tablet (100 mg) by mouth nightly as needed for sleep   Quantity:  90 tablet   Refills:  0         These medicines have changed or have updated prescriptions.        Dose/Directions    citalopram 40 MG tablet   Commonly known as:  celeXA   This may have changed:    - how much to take  - how to take this  - when to take this  - additional instructions   Used for:  Moderate episode of recurrent major depressive disorder (H)   Changed by:  Doris Cruz PA-C        Dose:  20 mg "   Take 0.5 tablets (20 mg) by mouth daily   Quantity:  90 tablet   Refills:  3            Where to get your medicines      These medications were sent to [facility] - [address]  [address] [facility] [address], [facility] [address]     Phone:  [phone]     traZODone 100 MG tablet         Some of these will need a paper prescription and others can be bought over the counter.  Ask your nurse if you have questions.     You don't need a prescription for these medications     citalopram 40 MG tablet                Primary Care Provider Office Phone # Fax #    Gabriela Moore [phone]       [address] [place] [id number]        Equal Access to Services     BENITA CRAIG : Hadii aad ku hadasho Sopatricia, waaxda luqadaha, qaybta kaalmada adeegyada, bertha rivas. So [facility] [phone].    ATENCIÓN: Si habla español, tiene a randhwaa disposición servicios gratuitos de asistencia lingüística. [facility] [phone].    We comply with applicable federal civil rights laws and [place] laws. We do not discriminate on the basis of race, color, national origin, age, disability, sex, sexual orientation, or gender identity.            Thank you!     Thank you for choosing [facility]  for your care. Our goal is always to provide you with excellent care. Hearing back from our patients is one way we can continue to improve our services. Please take a few minutes to complete the written survey that you may receive in the mail after your visit with us. Thank you!             Your Updated Medication List - Protect others around you: Learn how to safely use, store and throw away your medicines at www.disposemymeds.org.          This list is accurate as of 6/4/18  6:45 PM.  Always use your most recent med list.                   Brand Name Dispense Instructions for use Diagnosis    CALCIUM 600+D 600-200 MG-UNIT Tabs   Generic  drug:  calcium carbonate-vitamin D      Take 1 tablet by mouth    Encounter for routine adult medical exam with abnormal findings       citalopram 40 MG tablet    celeXA    90 tablet    Take 0.5 tablets (20 mg) by mouth daily    Moderate episode of recurrent major depressive disorder (H)       FISH OIL PO       Encounter for routine adult medical exam with abnormal findings       prenatal multivitamin plus iron 27-0.8 MG Tabs per tablet     100 tablet    Take 1 tablet by mouth daily    Heterozygous MTHFR mutation C677T (H)       simvastatin 40 MG tablet    ZOCOR    90 tablet    one tab every evening    Hyperlipidemia LDL goal <130       traZODone 100 MG tablet    DESYREL    90 tablet    Take 1 tablet (100 mg) by mouth nightly as needed for sleep    Depression, major, recurrent, in partial remission (H)       valACYclovir 500 MG tablet    VALTREX    6 tablet    Take 1 tablet (500 mg) by mouth 2 times daily    Genital herpes simplex, unspecified site

## 2018-06-04 NOTE — TELEPHONE ENCOUNTER
Pt would like a call back from LP today regarding questions.    Amber Dickerson RN  [facility]

## 2018-06-04 NOTE — TELEPHONE ENCOUNTER
Patient calling with questions on the [employer] results.  She is having a difficult time understanding the report.  Please advise.    [phone] ok to leave detailed message    Cass Dumont

## 2018-06-19 ENCOUNTER — TELEPHONE (OUTPATIENT)
Dept: FAMILY MEDICINE | Facility: CLINIC | Age: 63
End: 2018-06-19

## 2018-06-19 NOTE — TELEPHONE ENCOUNTER
Pt calling asking if she is taking too much calcium and if she should stop her fish oil  Because she is concerned she is taking too much medication     Pt is taking 250 mg of calcium in her prenatal   600 mg calcium with vit D 3     Rn advised that the providers recommend 1200 mg of calcium daily either through supplements or/and food and she should continue to take her fish oil to help reduce cholesterol levels     Patient stated an understanding and agreed with plan.    Madeline Lewis RN, BSN  [facility]

## 2018-08-06 ENCOUNTER — TELEPHONE (OUTPATIENT)
Dept: FAMILY MEDICINE | Facility: CLINIC | Age: 63
End: 2018-08-06

## 2018-08-06 NOTE — TELEPHONE ENCOUNTER
Patient calling initially for a medication question/refill on Celexa. Advised patient to not stop taking medication abruptly.  Writer was informed by reception the patient stated she has been having numbness/tingling on BLE  Numbness and tingling below the knees, legs feel tight, numbness in toes. History DVT 23 years ago. Patient states symptoms had a gradual onset and also stiff neck, painful to turn head to the right, but can turn head to left and chin to chest.    Denies legs being swollen, redness, warmth, confusion, severe headache, weakness in legs, difficulty speaking, slurred speech, blurred vision, history of illness/surgery, fingers or toes are cold, painful area, swollen, warm, severe pain, recent trauma, dizziness.    Advised patient to be seen in the next 24 hours, no appointments available, advised patient go to urgent care, patient declined.  Advised patient if she develops change in mental status, sudden weakness on one side of body, difficulty speaking or slurred speech, loss of bowel control, CP, vision changes, fingers or toes, to seek emergency medical care. Patient stated understanding was agreeable with plan    Arabella Pérez RN  [employer] Triage

## 2018-08-09 ENCOUNTER — OFFICE VISIT (OUTPATIENT)
Dept: FAMILY MEDICINE | Facility: CLINIC | Age: 63
End: 2018-08-09
Payer: COMMERCIAL

## 2018-08-09 ENCOUNTER — HOSPITAL ENCOUNTER (OUTPATIENT)
Dept: ULTRASOUND IMAGING | Facility: CLINIC | Age: 63
Discharge: HOME | End: 2018-08-09
Attending: PHYSICIAN ASSISTANT | Admitting: PHYSICIAN ASSISTANT
Payer: COMMERCIAL

## 2018-08-09 VITALS
DIASTOLIC BLOOD PRESSURE: 77 MMHG | OXYGEN SATURATION: 97 % | WEIGHT: 200.4 LBS | HEART RATE: 104 BPM | BODY MASS INDEX: 31.45 KG/M2 | HEIGHT: 67 IN | SYSTOLIC BLOOD PRESSURE: 120 MMHG

## 2018-08-09 DIAGNOSIS — M79.604 PAIN IN BOTH LOWER EXTREMITIES: ICD-10-CM

## 2018-08-09 DIAGNOSIS — M79.605 PAIN IN BOTH LOWER EXTREMITIES: ICD-10-CM

## 2018-08-09 DIAGNOSIS — R78.89 BLOOD D-DIMER ASSAY POSITIVE: ICD-10-CM

## 2018-08-09 DIAGNOSIS — I82.401 ACUTE DEEP VEIN THROMBOSIS (DVT) OF RIGHT LOWER EXTREMITY, UNSPECIFIED VEIN (H): ICD-10-CM

## 2018-08-09 DIAGNOSIS — F33.41 DEPRESSION, MAJOR, RECURRENT, IN PARTIAL REMISSION (H): Primary | ICD-10-CM

## 2018-08-09 DIAGNOSIS — Z15.89 HETEROZYGOUS MTHFR MUTATION C677T: ICD-10-CM

## 2018-08-09 LAB
ALBUMIN SERPL-MCNC: 3.9 G/DL (ref 3.4–5)
ALP SERPL-CCNC: 60 U/L (ref 40–150)
ALT SERPL W P-5'-P-CCNC: 23 U/L (ref 0–50)
ANION GAP SERPL CALCULATED.3IONS-SCNC: 8 MMOL/L (ref 3–14)
AST SERPL W P-5'-P-CCNC: 15 U/L (ref 0–45)
BILIRUB SERPL-MCNC: 0.4 MG/DL (ref 0.2–1.3)
BUN SERPL-MCNC: 19 MG/DL (ref 7–30)
CALCIUM SERPL-MCNC: 8.8 MG/DL (ref 8.5–10.1)
CHLORIDE SERPL-SCNC: 107 MMOL/L (ref 94–109)
CK SERPL-CCNC: 145 U/L (ref 30–225)
CO2 SERPL-SCNC: 27 MMOL/L (ref 20–32)
CREAT SERPL-MCNC: 0.78 MG/DL (ref 0.52–1.04)
D DIMER PPP FEU-MCNC: 2.6 UG/ML FEU (ref 0–0.5)
ERYTHROCYTE [DISTWIDTH] IN BLOOD BY AUTOMATED COUNT: 13.2 % (ref 10–15)
GFR SERPL CREATININE-BSD FRML MDRD: 75 ML/MIN/1.7M2
GLUCOSE SERPL-MCNC: 91 MG/DL (ref 70–99)
HCT VFR BLD AUTO: 39.7 % (ref 35–47)
HGB BLD-MCNC: 13.5 G/DL (ref 11.7–15.7)
MAGNESIUM SERPL-MCNC: 1.9 MG/DL (ref 1.6–2.3)
MCH RBC QN AUTO: 32.1 PG (ref 26.5–33)
MCHC RBC AUTO-ENTMCNC: 34 G/DL (ref 31.5–36.5)
MCV RBC AUTO: 94 FL (ref 78–100)
PHOSPHATE SERPL-MCNC: 2.6 MG/DL (ref 2.5–4.5)
PLATELET # BLD AUTO: 195 10E9/L (ref 150–450)
POTASSIUM SERPL-SCNC: 4.3 MMOL/L (ref 3.4–5.3)
PROT SERPL-MCNC: 7.4 G/DL (ref 6.8–8.8)
RBC # BLD AUTO: 4.21 10E12/L (ref 3.8–5.2)
SODIUM SERPL-SCNC: 142 MMOL/L (ref 133–144)
TSH SERPL DL<=0.005 MIU/L-ACNC: 2.18 MU/L (ref 0.4–4)
WBC # BLD AUTO: 4.6 10E9/L (ref 4–11)

## 2018-08-09 PROCEDURE — 85379 FIBRIN DEGRADATION QUANT: CPT | Performed by: PHYSICIAN ASSISTANT

## 2018-08-09 PROCEDURE — 84100 ASSAY OF PHOSPHORUS: CPT | Performed by: PHYSICIAN ASSISTANT

## 2018-08-09 PROCEDURE — 84443 ASSAY THYROID STIM HORMONE: CPT | Performed by: PHYSICIAN ASSISTANT

## 2018-08-09 PROCEDURE — 83735 ASSAY OF MAGNESIUM: CPT | Performed by: PHYSICIAN ASSISTANT

## 2018-08-09 PROCEDURE — 80053 COMPREHEN METABOLIC PANEL: CPT | Performed by: PHYSICIAN ASSISTANT

## 2018-08-09 PROCEDURE — 82550 ASSAY OF CK (CPK): CPT | Performed by: PHYSICIAN ASSISTANT

## 2018-08-09 PROCEDURE — 85027 COMPLETE CBC AUTOMATED: CPT | Performed by: PHYSICIAN ASSISTANT

## 2018-08-09 PROCEDURE — 93970 EXTREMITY STUDY: CPT

## 2018-08-09 PROCEDURE — 82728 ASSAY OF FERRITIN: CPT | Performed by: PHYSICIAN ASSISTANT

## 2018-08-09 PROCEDURE — 82306 VITAMIN D 25 HYDROXY: CPT | Performed by: PHYSICIAN ASSISTANT

## 2018-08-09 PROCEDURE — 36415 COLL VENOUS BLD VENIPUNCTURE: CPT | Performed by: PHYSICIAN ASSISTANT

## 2018-08-09 PROCEDURE — 99215 OFFICE O/P EST HI 40 MIN: CPT | Performed by: PHYSICIAN ASSISTANT

## 2018-08-09 RX ORDER — CITALOPRAM HYDROBROMIDE 20 MG/1
20 TABLET ORAL DAILY
Qty: 90 TABLET | Refills: 1 | Status: SHIPPED | OUTPATIENT
Start: 2018-08-09 | End: 2018-09-10

## 2018-08-09 RX ORDER — TRAZODONE HYDROCHLORIDE 100 MG/1
100 TABLET ORAL
Qty: 90 TABLET | Refills: 0 | Status: SHIPPED | OUTPATIENT
Start: 2018-08-09 | End: 2018-09-10

## 2018-08-09 ASSESSMENT — PATIENT HEALTH QUESTIONNAIRE - PHQ9: 5. POOR APPETITE OR OVEREATING: NOT AT ALL

## 2018-08-09 ASSESSMENT — ANXIETY QUESTIONNAIRES
2. NOT BEING ABLE TO STOP OR CONTROL WORRYING: SEVERAL DAYS
3. WORRYING TOO MUCH ABOUT DIFFERENT THINGS: SEVERAL DAYS
IF YOU CHECKED OFF ANY PROBLEMS ON THIS QUESTIONNAIRE, HOW DIFFICULT HAVE THESE PROBLEMS MADE IT FOR YOU TO DO YOUR WORK, TAKE CARE OF THINGS AT HOME, OR GET ALONG WITH OTHER PEOPLE: SOMEWHAT DIFFICULT
GAD7 TOTAL SCORE: 2
7. FEELING AFRAID AS IF SOMETHING AWFUL MIGHT HAPPEN: NOT AT ALL
5. BEING SO RESTLESS THAT IT IS HARD TO SIT STILL: NOT AT ALL
6. BECOMING EASILY ANNOYED OR IRRITABLE: NOT AT ALL
1. FEELING NERVOUS, ANXIOUS, OR ON EDGE: NOT AT ALL

## 2018-08-09 NOTE — PROGRESS NOTES
"  SUBJECTIVE:   Inessa Castaneda is a 63 year old female who presents to clinic today for the following health issues:      Medication Followup of  Citalopram 20 mg tablet and trazodone 100 mg nightly    Taking Medication as prescribed: yes    Side Effects:  None    Medication Helping Symptoms:  Yes    Patient had gene site testing done in late May and based on these results I did recommend transitioning to Pristiq.  However, the patient was apprehensive to completely transition off of citalopram as she has been on this medication for quite some time.  (This was in her yellow \"use with caution \"column) she was willing to do a trial of lowering her citalopram dosage to 20 mg and adding trazodone as a second agent.  She began this on 6/4/2018.  Of note, she does have a strong family history of bipolar disorder.  She was advised to start a prenatal vitamin for folate supplementation as well due to being a heterozygote for MTHFR.    Her mood feels fairly stable and she feels as though she is sleeping very well.  Overall she is quite happy with the combination of trazodone and citalopram.    Over the last 2 weeks she has noticed that she has had bilateral leg pain that she is attributing to possibly the medication changes.  She denies any activity changes, any trauma or falls, or any other diet or supplement changes..  She has no known family history of clotting disorders.     Depression Followup    Status since last visit: Improved     See PHQ-9 for current symptoms.  Other associated symptoms: None    Complicating factors:   Significant life event:  No   Current substance abuse:  None  Anxiety or Panic symptoms:  No    PHQ-9 3/1/2018 5/14/2018 8/9/2018   Total Score 4 17 10   Q9: Suicide Ideation Not at all Several days Not at all     PHQ-9  English  PHQ-9   Any Language  Suicide Assessment Five-step Evaluation and Treatment (SAFE-T)    Amount of exercise or physical activity: 2-3 days/week for an average of 30-45 " minutes    Problems taking medications regularly: No    Medication side effects: none    Diet: regular (no restrictions)       Problem list and histories reviewed & adjusted, as indicated.  Additional history: as documented    Patient Active Problem List   Diagnosis     Depression, major, recurrent, in partial remission (H)     Hyperlipidemia LDL goal <130     Adenomatous colon polyp-at age 52, then normal colonoscopy at age 55 and then at age 60- needs repeat colonoscopy 2021      Urinary urgency     DVT (deep venous thrombosis) (H)     Genital herpes simplex, unspecified site     Cystocele     Family history of thyroid disorder- mother and sister     Non morbid obesity due to excess calories     Asymptomatic postmenopausal status     Urge incontinence of urine     Family history of osteoporosis- mother with signif. disease      Heterozygous MTHFR mutation C677T (H) - needs prenatal for folate supplementation     Past Surgical History:   Procedure Laterality Date     BREAST BIOPSY, RT/LT Right ?     benign     BUNIONECTOMY Left 2008    Dr. Alberto Laws - left foot medial and lateral with left 2nd toe surgery      SLING BLADDER SUSPENSION WITH FASCIA SULAIMAN  2008    - Dr. Emanuel Paz - had DVT afterward      XR ANKLE SURGERY NIXON LEFT  2008    Dr. Alberto Laws -arthroscopy ankle s/p fall - not fused        Social History   Substance Use Topics     Smoking status: Never Smoker     Smokeless tobacco: Never Used     Alcohol use 0.0 oz/week     0 Standard drinks or equivalent per week      Comment: 2 times/month  - 1-2 glasses of wine      Family History   Problem Relation Age of Onset     Cerebrovascular Disease Mother 88     tiny strokes - TIA -  at age 90     Hypertension Mother      Alzheimer Disease Father 81      age 89     HEART DISEASE Father 63     s/p MI - first at age 63 , then s/p 4 vessel CABG     Mental Illness Sister      bipolar disorder -with heroin addiction - on  "methadone - no contact for years      Mental Illness Sister      bipolar - 2 with hoarding tendencies      Mental Illness Brother       after fall while drunk - alcoholic      Vascular Disease Brother      dissecting thoracic aortic aneurysm      Hepatitis Brother      Hep C - treated     Mental Illness Sister      hoarding tendencies     Substance Abuse Sister      Opiates      Attention Deficit Disorder Daughter      Substance Abuse Daughter      adderall that was not prescribed         Current Outpatient Prescriptions   Medication Sig Dispense Refill     calcium carbonate-vitamin D (CALCIUM 600+D) 600-200 MG-UNIT TABS Take 1 tablet by mouth       citalopram (CELEXA) 20 MG tablet Take 1 tablet (20 mg) by mouth daily 90 tablet 1     Omega-3 Fatty Acids (FISH OIL PO)        Prenatal Vit-Fe Fumarate-FA (PRENATAL MULTIVITAMIN PLUS IRON) 27-0.8 MG TABS per tablet Take 1 tablet by mouth daily 100 tablet 3     simvastatin (ZOCOR) 40 MG tablet one tab every evening 90 tablet 1     traZODone (DESYREL) 100 MG tablet Take 1 tablet (100 mg) by mouth nightly as needed for sleep 90 tablet 0     valACYclovir (VALTREX) 500 MG tablet Take 1 tablet (500 mg) by mouth 2 times daily 6 tablet 11     Allergies   Allergen Reactions     Sulfa Drugs      rash       Reviewed and updated as needed this visit by clinical staff  Tobacco  Allergies  Meds  Problems  Med Hx  Surg Hx  Fam Hx  Soc Hx        Reviewed and updated as needed this visit by Provider  Tobacco  Allergies  Meds  Problems  Med Hx  Surg Hx  Fam Hx  Soc Hx          ROS:  Constitutional, HEENT, cardiovascular, pulmonary, GI, , musculoskeletal, neuro, skin, endocrine and psych systems are negative, except as otherwise noted.    OBJECTIVE:     /77 (BP Location: Right arm, Patient Position: Chair, Cuff Size: Adult Large)  Pulse 104  Ht 5' 7.25\" (1.708 m)  Wt 200 lb 6.4 oz (90.9 kg)  SpO2 97%  Breastfeeding? No  BMI 31.15 kg/m2  Body mass index is " 31.15 kg/(m^2).  GENERAL: healthy, alert and no distress  RESP: lungs clear to auscultation - no rales, rhonchi or wheezes  CV: regular rate and rhythm, normal S1 S2, no S3 or S4, no murmur, click or rub, no peripheral edema and peripheral pulses strong  MS: no gross musculoskeletal defects noted, no edema, no tenderness to bilateral calves.  No swelling or warmth appreciated.  Able to plantar and dorsiflex her feet without pain.  Pulses normal bilaterally  SKIN: no suspicious lesions or rashes  NEURO: Normal strength and tone, mentation intact and speech normal  PSYCH: mentation appears normal, affect normal/bright    Diagnostic Test Results:  Results for orders placed or performed in visit on 08/09/18   D dimer, quantitative   Result Value Ref Range    D Dimer 2.6 (H) 0.0 - 0.50 ug/ml FEU   TSH with free T4 reflex   Result Value Ref Range    TSH 2.18 0.40 - 4.00 mU/L   Comprehensive metabolic panel   Result Value Ref Range    Sodium 142 133 - 144 mmol/L    Potassium 4.3 3.4 - 5.3 mmol/L    Chloride 107 94 - 109 mmol/L    Carbon Dioxide 27 20 - 32 mmol/L    Anion Gap 8 3 - 14 mmol/L    Glucose 91 70 - 99 mg/dL    Urea Nitrogen 19 7 - 30 mg/dL    Creatinine 0.78 0.52 - 1.04 mg/dL    GFR Estimate 75 >60 mL/min/1.7m2    GFR Estimate If Black >90 >60 mL/min/1.7m2    Calcium 8.8 8.5 - 10.1 mg/dL    Bilirubin Total 0.4 0.2 - 1.3 mg/dL    Albumin 3.9 3.4 - 5.0 g/dL    Protein Total 7.4 6.8 - 8.8 g/dL    Alkaline Phosphatase 60 40 - 150 U/L    ALT 23 0 - 50 U/L    AST 15 0 - 45 U/L   Phosphorus   Result Value Ref Range    Phosphorus 2.6 2.5 - 4.5 mg/dL   CK total   Result Value Ref Range    CK Total 145 30 - 225 U/L   CBC with platelets   Result Value Ref Range    WBC 4.6 4.0 - 11.0 10e9/L    RBC Count 4.21 3.8 - 5.2 10e12/L    Hemoglobin 13.5 11.7 - 15.7 g/dL    Hematocrit 39.7 35.0 - 47.0 %    MCV 94 78 - 100 fl    MCH 32.1 26.5 - 33.0 pg    MCHC 34.0 31.5 - 36.5 g/dL    RDW 13.2 10.0 - 15.0 %    Platelet Count 195 150 -  450 10e9/L   Magnesium   Result Value Ref Range    Magnesium 1.9 1.6 - 2.3 mg/dL   Ferritin   Result Value Ref Range    Ferritin 47 8 - 252 ng/mL     Recent Results (from the past 744 hour(s))   US Lower Extremity Venous Duplex Bilateral    Narrative    ULTRASOUND VENOUS LOWER EXTREMITY BILATERAL 8/9/2018 7:20 PM     HISTORY: ; Pain in both lower extremities; Pain in both lower  extremities; Blood D-dimer assay positive    COMPARISON: 3/6/2008 ultrasound.    TECHNIQUE: Ultrasound gray scale, Color Doppler flow, and spectral  Doppler waveform analysis performed.    FINDINGS: The bilateral common femoral, superficial femoral, popliteal  and posterior tibial veins are patent and fully compressible and  demonstrate normal venous Doppler flow.      Impression    IMPRESSION: No DVT demonstrated.    ANTELMO CHERRY MD       ASSESSMENT/PLAN:     Inessa was seen today for recheck.    Diagnoses and all orders for this visit:    Depression, major, recurrent, in partial remission (H)  We will continue current medication regimen of citalopram and trazodone.  Plan to follow-up in 6 months time if doing well.  -     traZODone (DESYREL) 100 MG tablet; Take 1 tablet (100 mg) by mouth nightly as needed for sleep  -     citalopram (CELEXA) 20 MG tablet; Take 1 tablet (20 mg) by mouth daily    Heterozygous MTHFR mutation C677T (H) - needs prenatal for folate supplementation, Acute deep vein thrombosis (DVT) of right lower extremity, unspecified vein (H),  Blood D-dimer assay positive  Pain in both lower extremities  Due to positive d-dimer and history of DVT sent patient for stat ultrasound of bilateral lower extremities.  Fortunately, this was negative for venous thrombosis.  This could be attributed to the prenatal vitamin.  Have advised her to hold this for 2 weeks and keep us informed if her leg cramps go away.  Did recommend that the patient stay well-hydrated in the meantime.  Gentle stretching also recommended.  Patient voiced  understanding and agreement.  -     D dimer, quantitative  -     Vitamin D Deficiency  -     TSH with free T4 reflex  -     Comprehensive metabolic panel  -     Phosphorus  -     CK total  -     CBC with platelets  -     Magnesium  -     Ferritin  -     US Lower Extremity Venous Duplex Bilateral; Future        Return in about 6 months (around 2/9/2019) for Routine Visit.      Doris Cruz PA-C  [facility] [place]

## 2018-08-09 NOTE — MR AVS SNAPSHOT
After Visit Summary   8/9/2018    Inessa Castaneda    MRN: [id number]           Patient Information     Date Of Birth          1955        Visit Information        Provider Department      8/9/2018 1:40 PM Doris Cruz PA-C [facility]        Today's Diagnoses     Depression, major, recurrent, in partial remission (H)    -  1    Heterozygous MTHFR mutation C677T (H) - needs prenatal for folate supplementation        Acute deep vein thrombosis (DVT) of right lower extremity, unspecified vein (H)        Moderate episode of recurrent major depressive disorder (H)        Pain in both lower extremities           Follow-ups after your visit        Follow-up notes from your care team     Return in about 6 months (around 2/9/2019) for Routine Visit.      Your next 10 appointments already scheduled     Sep 10, 2018  9:40 AM CDT   Office Visit with Gabriela Moore MD   [facility]    [address] [place] [id number] [phone]           Bring a current list of meds and any records pertaining to this visit. For Physicals, please bring immunization records and any forms needing to be filled out. Please arrive 10 minutes early to complete paperwork.              Who to contact     If you have questions or need follow up information about today's clinic visit or your schedule please contact [facility] directly at [phone].  Normal or non-critical lab and imaging results will be communicated to you by MyChart, letter or phone within 4 business days after the clinic has received the results. If you do not hear from us within 7 days, please contact the clinic through MyChart or phone. If you have a critical or abnormal lab result, we will notify you by phone as soon as possible.  Submit refill requests through [employer] or call your pharmacy and they will forward the refill request to us. Please  "allow 3 business days for your refill to be completed.          Additional Information About Your Visit        MyChart Information     [employer] lets you send messages to your doctor, view your test results, renew your prescriptions, schedule appointments and more. To sign up, go to [email or web address] . Click on \"Log in\" on the left side of the screen, which will take you to the Welcome page. Then click on \"Sign up Now\" on the right side of the page.     You will be asked to enter the access code listed below, as well as some personal information. Please follow the directions to create your username and password.     Your access code is: [email or web address]  Expires: 2018  6:45 PM     Your access code will  in 90 days. If you need help or a new code, please call your [facility] clinic or [phone].        Care EveryWhere ID     This is your Care EveryWhere ID. This could be used by other organizations to access your [facility] medical records  [id number]        Your Vitals Were     Pulse Height Pulse Oximetry Breastfeeding? BMI (Body Mass Index)       104 5' 7.25\" (1.708 m) 97% No 31.15 kg/m2        Blood Pressure from Last 3 Encounters:   18 120/77   18 108/72   18 112/80    Weight from Last 3 Encounters:   18 200 lb 6.4 oz (90.9 kg)   18 196 lb 6 oz (89.1 kg)   18 193 lb 9.6 oz (87.8 kg)              We Performed the Following     CBC with platelets     CK total     Comprehensive metabolic panel     D dimer, quantitative     Ferritin     Magnesium     Phosphorus     TSH with free T4 reflex     Vitamin D Deficiency          Today's Medication Changes          These changes are accurate as of 18  2:15 PM.  If you have any questions, ask your nurse or doctor.               These medicines have changed or have updated prescriptions.        Dose/Directions    citalopram 20 MG tablet   Commonly known as:  celeXA   This may have changed:  medication strength   Used for:  " Moderate episode of recurrent major depressive disorder (H)   Changed by:  Doris Cruz PA-C        Dose:  20 mg   Take 1 tablet (20 mg) by mouth daily   Quantity:  90 tablet   Refills:  1            Where to get your medicines      These medications were sent to [facility] - [address]  [address] [facility] [address], [facility] [address]     Phone:  [phone]     citalopram 20 MG tablet    traZODone 100 MG tablet                Primary Care Provider Office Phone # Fax #    Gabriela Moore [phone] [address] [facility] [address]        Equal Access to Services     [facility] CenterFELICIA : Hadii aad ku hadasho Sopatricia, waaxda luqadaha, qaybta kaalmada adehemalyada, bertha chandler . So [facility] [phone].    ATENCIÓN: Si habla español, tiene a randhawa disposición servicios gratuitos de asistencia lingüística. Llame al [phone].    We comply with applicable federal civil rights laws and [place] laws. We do not discriminate on the basis of race, color, national origin, age, disability, sex, sexual orientation, or gender identity.            Thank you!     Thank you for choosing [facility]  for your care. Our goal is always to provide you with excellent care. Hearing back from our patients is one way we can continue to improve our services. Please take a few minutes to complete the written survey that you may receive in the mail after your visit with us. Thank you!             Your Updated Medication List - Protect others around you: Learn how to safely use, store and throw away your medicines at www.disposemymeds.org.          This list is accurate as of 8/9/18  2:15 PM.  Always use your most recent med list.                   Brand Name Dispense Instructions for use Diagnosis    CALCIUM 600+D 600-200 MG-UNIT Tabs   Generic drug:  calcium carbonate-vitamin D      Take 1 tablet by mouth     Encounter for routine adult medical exam with abnormal findings       citalopram 20 MG tablet    celeXA    90 tablet    Take 1 tablet (20 mg) by mouth daily    Moderate episode of recurrent major depressive disorder (H)       FISH OIL PO       Encounter for routine adult medical exam with abnormal findings       prenatal multivitamin plus iron 27-0.8 MG Tabs per tablet     100 tablet    Take 1 tablet by mouth daily    Heterozygous MTHFR mutation C677T (H)       simvastatin 40 MG tablet    ZOCOR    90 tablet    one tab every evening    Hyperlipidemia LDL goal <130       traZODone 100 MG tablet    DESYREL    90 tablet    Take 1 tablet (100 mg) by mouth nightly as needed for sleep    Depression, major, recurrent, in partial remission (H)       valACYclovir 500 MG tablet    VALTREX    6 tablet    Take 1 tablet (500 mg) by mouth 2 times daily    Genital herpes simplex, unspecified site

## 2018-08-10 LAB
DEPRECATED CALCIDIOL+CALCIFEROL SERPL-MC: 42 UG/L (ref 20–75)
FERRITIN SERPL-MCNC: 47 NG/ML (ref 8–252)

## 2018-08-10 ASSESSMENT — PATIENT HEALTH QUESTIONNAIRE - PHQ9: SUM OF ALL RESPONSES TO PHQ QUESTIONS 1-9: 10

## 2018-08-10 ASSESSMENT — ANXIETY QUESTIONNAIRES: GAD7 TOTAL SCORE: 2

## 2018-08-13 ENCOUNTER — TELEPHONE (OUTPATIENT)
Dept: FAMILY MEDICINE | Facility: CLINIC | Age: 63
End: 2018-08-13

## 2018-08-13 NOTE — TELEPHONE ENCOUNTER
"Inessa called back after I had already closed lab encounter. She said she had trouble coming up with the word \"numbness\" when we last talked and she just wanted to let me know  Rosie Ge RN- Triage [employer]    "

## 2018-09-10 ENCOUNTER — OFFICE VISIT (OUTPATIENT)
Dept: FAMILY MEDICINE | Facility: CLINIC | Age: 63
End: 2018-09-10
Payer: COMMERCIAL

## 2018-09-10 VITALS
OXYGEN SATURATION: 96 % | HEART RATE: 65 BPM | HEIGHT: 67 IN | TEMPERATURE: 98.5 F | WEIGHT: 203 LBS | DIASTOLIC BLOOD PRESSURE: 80 MMHG | SYSTOLIC BLOOD PRESSURE: 131 MMHG | BODY MASS INDEX: 31.86 KG/M2

## 2018-09-10 DIAGNOSIS — E78.5 HYPERLIPIDEMIA LDL GOAL <130: ICD-10-CM

## 2018-09-10 DIAGNOSIS — F33.41 DEPRESSION, MAJOR, RECURRENT, IN PARTIAL REMISSION (H): ICD-10-CM

## 2018-09-10 DIAGNOSIS — F51.05 INSOMNIA DUE TO OTHER MENTAL DISORDER: ICD-10-CM

## 2018-09-10 DIAGNOSIS — F99 INSOMNIA DUE TO OTHER MENTAL DISORDER: ICD-10-CM

## 2018-09-10 DIAGNOSIS — R20.0 NUMBNESS IN BOTH LEGS: ICD-10-CM

## 2018-09-10 DIAGNOSIS — Z15.89 HETEROZYGOUS MTHFR MUTATION C677T: Primary | ICD-10-CM

## 2018-09-10 PROCEDURE — 99214 OFFICE O/P EST MOD 30 MIN: CPT | Performed by: FAMILY MEDICINE

## 2018-09-10 RX ORDER — CITALOPRAM HYDROBROMIDE 20 MG/1
20 TABLET ORAL DAILY
Qty: 90 TABLET | Refills: 1 | Status: SHIPPED | OUTPATIENT
Start: 2018-09-10 | End: 2018-11-29

## 2018-09-10 RX ORDER — SIMVASTATIN 40 MG
TABLET ORAL
Qty: 90 TABLET | Refills: 1 | Status: SHIPPED | OUTPATIENT
Start: 2018-09-10 | End: 2019-02-15

## 2018-09-10 RX ORDER — TRAZODONE HYDROCHLORIDE 100 MG/1
100 TABLET ORAL
Qty: 90 TABLET | Refills: 3 | Status: SHIPPED | OUTPATIENT
Start: 2018-09-10 | End: 2018-11-29

## 2018-09-10 RX ORDER — OMEGA-3 FATTY ACIDS/FISH OIL 300-1000MG
2 CAPSULE ORAL DAILY
Qty: 180 CAPSULE | Refills: 1 | COMMUNITY
Start: 2018-09-10

## 2018-09-10 ASSESSMENT — ANXIETY QUESTIONNAIRES
6. BECOMING EASILY ANNOYED OR IRRITABLE: SEVERAL DAYS
3. WORRYING TOO MUCH ABOUT DIFFERENT THINGS: SEVERAL DAYS
GAD7 TOTAL SCORE: 4
IF YOU CHECKED OFF ANY PROBLEMS ON THIS QUESTIONNAIRE, HOW DIFFICULT HAVE THESE PROBLEMS MADE IT FOR YOU TO DO YOUR WORK, TAKE CARE OF THINGS AT HOME, OR GET ALONG WITH OTHER PEOPLE: NOT DIFFICULT AT ALL
2. NOT BEING ABLE TO STOP OR CONTROL WORRYING: SEVERAL DAYS
1. FEELING NERVOUS, ANXIOUS, OR ON EDGE: SEVERAL DAYS
5. BEING SO RESTLESS THAT IT IS HARD TO SIT STILL: NOT AT ALL
7. FEELING AFRAID AS IF SOMETHING AWFUL MIGHT HAPPEN: NOT AT ALL

## 2018-09-10 ASSESSMENT — PATIENT HEALTH QUESTIONNAIRE - PHQ9: 5. POOR APPETITE OR OVEREATING: NOT AT ALL

## 2018-09-10 NOTE — PATIENT INSTRUCTIONS
Try folic acid supplement 1000mcg daily for your MTHFR mutation.   Recheck with me again in 3/2019 for your annual wellness/physical exam.      Please, call or return to clinic or go to the ER immediately if signs or symptoms worsen or fail to improve as anticipated.   Recheck tomorrow am as scheduled for fasting labs.  We've entered future orders for this and you can do this as a lab only appointment.   You can have this done at any [facility] without appt during regular outpt lab hours or by lab only appt at any [facility].        See Patient Instructions               Thank you for choosing [facility]  for your Health Care. It was a pleasure seeing you at your visit today. Please contact us with any questions or concerns you may have.                   Gabriela Moore MD                                  To reach your [facility] care team after hours call:   [phone]    Our clinic hours are:     Monday- 7:30 am - 7:00 pm                             Tuesday through Friday- 7:30 am - 5:00 pm                                        Saturday- 8:00 am - 12:00 pm                  Phone:  [phone]    Our pharmacy hours are:     Monday  8:00 am to 7:00 pm      Tuesday through Friday 8:00am to 6:00pm                        Saturday - 9:00 am to 1:00 pm      Sunday : Closed.              Phone:  [phone]      There is also information available at our web site:  [email or web address]    If your provider ordered any lab tests and you do not receive the results within 10 business days, please call the clinic.    If you need a medication refill please contact your pharmacy.  Please allow 2 business days for your refill to be completed.    Our clinic offers telephone visits and e visits.  Please ask one of your team members to explain more.      Use [employer] (secure email communication and access to your chart) to send your primary care provider a message or make an  appointment. Ask someone on your Team how to sign up for MyChart.

## 2018-09-10 NOTE — PROGRESS NOTES
SUBJECTIVE:                                                    Inessa Castaneda is a 63 year old female who presents to clinic today for the following health issues:      Had reaction to Prenatal vitamin - has US Lower extremity on 08/09/2018 to r/o blood clot  Saw Doris Cruz PA-C for Genesight testing and was found to be heterozygous for MTHFR [id number]  on Genesight testing - had numbness B distal LE with prenatal vit- the numbness totally went away about 3days after  elimination of the prenatal vitamin     Hyperlipidemia Follow-Up - not fasting today.       Rate your low fat/cholesterol diet?: good    Taking statin?  Yes, no muscle aches from statin    Other lipid medications/supplements?:  Fish oil/Omega 3, dose 2-1000mg tabs/day  without side effects    Depression and Anxiety Follow-Up:     Status since last visit: No change    Other associated symptoms:None    Complicating factors:     Significant life event: No     Current substance abuse: None    PHQ-9 3/1/2018 5/14/2018 8/9/2018   Total Score 4 17 10   Q9: Suicide Ideation Not at all Several days Not at all     MIGEL-7 SCORE 3/1/2018 5/14/2018 8/9/2018   Total Score 2 12 2       PHQ-9  English  PHQ-9   Any Language  MIGEL-7  Suicide Assessment Five-step Evaluation and Treatment (SAFE-T)    Amount of exercise or physical activity: 2-3 days/week for an average of 15-30 minutes    Problems taking medications regularly: No    Medication side effects: none    Diet: regular (no restrictions)        Problem list and histories reviewed & adjusted, as indicated.  Additional history: as documented    Reviewed and updated as needed this visit by clinical staff  Tobacco  Allergies  Meds  Med Hx  Surg Hx  Fam Hx  Soc Hx      Reviewed and updated as needed this visit by Provider       Recent Labs   Lab Test  03/01/18   1042  01/09/17   1127   CHOL  188  203*   HDL  60  60   LDL  107*  98   TRIG  106  227*        Patient Active Problem List   Diagnosis      "Depression, major, recurrent, in partial remission (H)     Hyperlipidemia LDL goal <130     Adenomatous colon polyp-at age 52, then normal colonoscopy at age 55 and then at age 60- needs repeat colonoscopy 1/2021      Urinary urgency     DVT (deep venous thrombosis) (H)     Genital herpes simplex, unspecified site     Cystocele     Family history of thyroid disorder- mother and sister     Non morbid obesity due to excess calories     Asymptomatic postmenopausal status     Urge incontinence of urine     Family history of osteoporosis- mother with signif. disease      Heterozygous MTHFR mutation C677T (H)-on Genesight testing - had numbness B distal LE with prenatal vit       Current Outpatient Prescriptions   Medication Sig Dispense Refill     calcium carbonate-vitamin D (CALCIUM 600+D) 600-200 MG-UNIT TABS Take 1 tablet by mouth       citalopram (CELEXA) 20 MG tablet Take 1 tablet (20 mg) by mouth daily 90 tablet 1     Omega-3 Fatty Acids (FISH OIL PO)        simvastatin (ZOCOR) 40 MG tablet one tab every evening 90 tablet 1     traZODone (DESYREL) 100 MG tablet Take 1 tablet (100 mg) by mouth nightly as needed for sleep 90 tablet 0     valACYclovir (VALTREX) 500 MG tablet Take 1 tablet (500 mg) by mouth 2 times daily 6 tablet 11          Allergies   Allergen Reactions     Sulfa Drugs      rash          ROS:    ROS: 12 point ROS neg other than the symptoms noted above    OBJECTIVE:                                                    /80 (BP Location: Left arm, Patient Position: Chair, Cuff Size: Adult Large)  Pulse 65  Temp 98.5  F (36.9  C) (Oral)  Ht 5' 7.25\" (1.708 m)  Wt 203 lb (92.1 kg)  SpO2 96%  Breastfeeding? No  BMI 31.56 kg/m2  Body mass index is 31.56 kg/(m^2).   GENERAL: healthy, alert, well nourished, well hydrated, no distress  HENT: ear canals- normal; TMs- normal; Nose- normal; Mouth- no ulcers, no lesions  NECK: no tenderness, no adenopathy, no asymmetry, no masses, no stiffness; " thyroid- normal to palpation  RESP: lungs clear to auscultation - no rales, no rhonchi, no wheezes  CV: regular rates and rhythm, normal S1 S2, no S3 or S4 and no murmur, no click or rub -  ABDOMEN: soft, no tenderness, no  hepatosplenomegaly, no masses, normal bowel sounds  MS: extremities- no gross deformities noted, no edema.   Alert and oriented. No acute distress. Appears well-groomed and casually dressed. Affect is normal, not particularly depressed. In good humor and laughs appropriately. Not particularly anxious. No evidence of psychosis.   FEET: both sides normal; no swelling, tenderness or skin or vascular lesions.  Sensation is intact. Peripheral pulses are palpable. Toenails are normal.     Diagnostic test results:  See [facility] orders.      ASSESSMENT/PLAN:                                                        ICD-10-CM    1. Heterozygous MTHFR mutation C677T (H) - on Genesight testing - had numbness B distal LE with prenatal vit  E72.12    2. Hyperlipidemia LDL goal <130- needs f/u today - awaiting lab results.  E78.5 simvastatin (ZOCOR) 40 MG tablet     Lipid panel reflex to direct LDL Fasting     **AST FUTURE 2mo     **ALT FUTURE 2mo     omega 3 1000 MG CAPS   3. Depression, major, recurrent, in partial remission (H) F33.41 citalopram (CELEXA) 20 MG tablet     traZODone (DESYREL) 100 MG tablet   4. 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 Numbness in both legs- went away after coming off prenatal vitamin for MTHFR mutation  R20.0      Try folic acid supplement 1000mcg daily for your MTHFR mutation.  Please, call or return to clinic or go to the ER immediately if signs or symptoms worsen or fail to improve as anticipated. If numbness in legs resumes,then call right away.   Recheck with me again in 3/2019 for your annual wellness/physical exam.     Please, call or return to clinic or go to the ER immediately if signs or  symptoms worsen or fail to improve as anticipated.   Recheck tomorrow am as scheduled for fasting labs.  We've entered future orders for this and you can do this as a lab only appointment.   You can have this done at any [facility] without appt during regular outpt lab hours or by lab only appt at any [place] clinic.       See Patient Instructions.          Gabriela Moore MD    [facility]

## 2018-09-10 NOTE — MR AVS SNAPSHOT
After Visit Summary   9/10/2018    Inessa Castaneda    MRN: [id number]           Patient Information     Date Of Birth          1955        Visit Information        Provider Department      9/10/2018 9:40 AM Gabriela Moore MD [facility]        Today's Diagnoses     Heterozygous MTHFR mutation C677T (H) - on Genesight testing - had numbness B distal LE with prenatal vit     -  1    Hyperlipidemia LDL goal <130- needs f/u today - awaiting lab results.         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   Recheck with me again in 3/2019 for your annual wellness/physical exam.      Please, call or return to clinic or go to the ER immediately if signs or symptoms worsen or fail to improve as anticipated.   Recheck tomorrow am as scheduled for fasting labs.  We've entered future orders for this and you can do this as a lab only appointment.   You can have this done at any [facility] without appt during regular outpt lab hours or by lab only appt at any [facility].        See Patient Instructions               Thank you for choosing [facility]  for your Health Care. It was a pleasure seeing you at your visit today. Please contact us with any questions or concerns you may have.                   Gabriela Moore MD                                  To reach your [facility] care team after hours call:   [phone]    Our clinic hours are:     Monday- 7:30 am - 7:00 pm                             Tuesday through Friday- 7:30 am - 5:00 pm                                        Saturday- 8:00 am - 12:00 pm                  Phone:  [phone]    Our pharmacy hours are:     Monday  8:00 am to 7:00 pm      Tuesday through Friday 8:00am to 6:00pm                         Saturday - 9:00 am to 1:00 pm      Sunday : Closed.              Phone:  [phone]      There is also information available at our web site:  [email or web address]    If your provider ordered any lab tests and you do not receive the results within 10 business days, please call the clinic.    If you need a medication refill please contact your pharmacy.  Please allow 2 business days for your refill to be completed.    Our clinic offers telephone visits and e visits.  Please ask one of your team members to explain more.      Use [employer] (secure email communication and access to your chart) to send your primary care provider a message or make an appointment. Ask someone on your Team how to sign up for [employer].                       Follow-ups after your visit        Follow-up notes from your care team     Return in about 6 months (around 3/10/2019) for Physical Exam, Lab Work, Routine Visit, cholesterol recheck.      Your next 10 appointments already scheduled     Sep 11, 2018  9:30 AM CDT   LAB with [facility] LAB   [facility]    [address] [place] [id number] [phone]           Please do not eat 10-12 hours before your appointment if you are coming in fasting for labs on lipids, cholesterol, or glucose (sugar). This does not apply to pregnant women. Water, hot tea and black coffee (with nothing added) are okay. Do not drink other fluids, diet soda or chew gum.              Future tests that were ordered for you today     Open Future Orders        Priority Expected Expires Ordered    Lipid panel reflex to direct LDL Fasting Routine 9/11/2018 12/10/2018 9/10/2018    **AST FUTURE 2mo Routine 9/11/2018 12/10/2018 9/10/2018    **ALT FUTURE 2mo Routine 9/11/2018 12/10/2018 9/10/2018            Who to contact     If you have questions or need follow up information about today's clinic visit or your schedule please contact [facility] directly at [phone].  Normal or non-critical lab and imaging results will be communicated to you by [employer], letter or phone within 4 business days after the clinic has received the results. If you do not hear from us within 7 days, please contact the clinic through [employer] or phone. If you have a critical or abnormal lab result, we will notify you by phone as soon as possible.  Submit refill requests through [employer] or call your pharmacy and they will forward the refill request to us. Please allow 3 business days for your refill to be completed.          Additional Information About Your Visit        [employer] Information     [employer] lets you send messages to your doctor, view your test results, renew your prescriptions, schedule appointments and more. To sign up, go to [email or web address] . Click on \"Log in\" on the left side of the screen, which will take you to the Welcome page. Then click on \"Sign up Now\" on the right side of the page.     You will be asked to enter the access code listed below, as well as some personal information. Please follow the directions to create your username and password.     Your access code is: [email or web address]  Expires: 2018 10:33 AM     Your access code will  in 90 days. If you need help or a new code, please call your [place] clinic or [phone].        Care EveryWhere ID     This is your Care EveryWhere ID. This could be used by other organizations to access your [place] medical records  [id number]        Your Vitals Were     Pulse Temperature Height Pulse Oximetry Breastfeeding? BMI (Body Mass Index)    65 98.5  F (36.9  C) (Oral) 5' 7.25\" (1.708 m) 96% No 31.56 kg/m2       Blood Pressure from Last 3 Encounters:   09/10/18 131/80   18 120/77   18 108/72    Weight from Last 3 Encounters:   09/10/18 203 lb (92.1 kg)   18 200 lb 6.4 oz (90.9 kg)   18 196 lb 6 oz (89.1 kg)                 Today's Medication Changes          These " changes are accurate as of 9/10/18 10:33 AM.  If you have any questions, ask your nurse or doctor.               These medicines have changed or have updated prescriptions.        Dose/Directions    * FISH OIL PO   This may have changed:  Another medication with the same name was added. Make sure you understand how and when to take each.   Used for:  Encounter for routine adult medical exam with abnormal findings   Changed by:  Gabriela Moore MD        Refills:  0       * omega 3 1000 MG Caps   This may have changed:  You were already taking a medication with the same name, and this prescription was added. Make sure you understand how and when to take each.   Used for:  Hyperlipidemia LDL goal <130   Changed by:  Gabriela Moore MD        Dose:  2 g   Take 2 g by mouth daily   Quantity:  180 capsule   Refills:  1       * Notice:  This list has 2 medication(s) that are the same as other medications prescribed for you. Read the directions carefully, and ask your doctor or other care provider to review them with you.         Where to get your medicines      These medications were sent to [facility] - [address], [facility] [address]     Phone:  [phone]     citalopram 20 MG tablet    simvastatin 40 MG tablet    traZODone 100 MG tablet         Some of these will need a paper prescription and others can be bought over the counter.  Ask your nurse if you have questions.     You don't need a prescription for these medications     omega 3 1000 MG Caps                Primary Care Provider Office Phone # Fax #    Gabriela Moore [phone]       [address] [place] [address]        Equal Access to Services     [facility]: Hadii jay pinto Sopatricia, waaxda luqadaha, qaybta kaalbertha beltre . So [facility] [phone].    ATENCIÓN: jessica Roa  randhawa disposición servicios gratuitos de asistencia lingüística. Joanie durham [phone].    We comply with applicable federal civil rights laws and [place] laws. We do not discriminate on the basis of race, color, national origin, age, disability, sex, sexual orientation, or gender identity.            Thank you!     Thank you for choosing [facility]  for your care. Our goal is always to provide you with excellent care. Hearing back from our patients is one way we can continue to improve our services. Please take a few minutes to complete the written survey that you may receive in the mail after your visit with us. Thank you!             Your Updated Medication List - Protect others around you: Learn how to safely use, store and throw away your medicines at www.disposemymeds.org.          This list is accurate as of 9/10/18 10:33 AM.  Always use your most recent med list.                   Brand Name Dispense Instructions for use Diagnosis    CALCIUM 600+D 600-200 MG-UNIT Tabs   Generic drug:  calcium carbonate-vitamin D      Take 1 tablet by mouth    Encounter for routine adult medical exam with abnormal findings       citalopram 20 MG tablet    celeXA    90 tablet    Take 1 tablet (20 mg) by mouth daily    Depression, major, recurrent, in partial remission (H)       * FISH OIL PO       Encounter for routine adult medical exam with abnormal findings       * omega 3 1000 MG Caps     180 capsule    Take 2 g by mouth daily    Hyperlipidemia LDL goal <130       simvastatin 40 MG tablet    ZOCOR    90 tablet    one tab every evening    Hyperlipidemia LDL goal <130       traZODone 100 MG tablet    DESYREL    90 tablet    Take 1 tablet (100 mg) by mouth nightly as needed for sleep    Depression, major, recurrent, in partial remission (H), Insomnia due to other mental disorder       valACYclovir 500 MG tablet    VALTREX    6 tablet    Take 1 tablet (500 mg) by mouth 2 times daily    Genital herpes simplex,  unspecified site       * Notice:  This list has 2 medication(s) that are the same as other medications prescribed for you. Read the directions carefully, and ask your doctor or other care provider to review them with you.

## 2018-09-11 DIAGNOSIS — E78.5 HYPERLIPIDEMIA LDL GOAL <130: ICD-10-CM

## 2018-09-11 LAB
ALT SERPL W P-5'-P-CCNC: 22 U/L (ref 0–50)
AST SERPL W P-5'-P-CCNC: 15 U/L (ref 0–45)
CHOLEST SERPL-MCNC: 179 MG/DL
HDLC SERPL-MCNC: 48 MG/DL
LDLC SERPL CALC-MCNC: 72 MG/DL
NONHDLC SERPL-MCNC: 131 MG/DL
TRIGL SERPL-MCNC: 293 MG/DL

## 2018-09-11 PROCEDURE — 36415 COLL VENOUS BLD VENIPUNCTURE: CPT | Performed by: FAMILY MEDICINE

## 2018-09-11 PROCEDURE — 84450 TRANSFERASE (AST) (SGOT): CPT | Performed by: FAMILY MEDICINE

## 2018-09-11 PROCEDURE — 84460 ALANINE AMINO (ALT) (SGPT): CPT | Performed by: FAMILY MEDICINE

## 2018-09-11 PROCEDURE — 80061 LIPID PANEL: CPT | Performed by: FAMILY MEDICINE

## 2018-09-11 ASSESSMENT — PATIENT HEALTH QUESTIONNAIRE - PHQ9: SUM OF ALL RESPONSES TO PHQ QUESTIONS 1-9: 7

## 2018-09-11 ASSESSMENT — ANXIETY QUESTIONNAIRES: GAD7 TOTAL SCORE: 4

## 2018-09-24 ENCOUNTER — TELEPHONE (OUTPATIENT)
Dept: FAMILY MEDICINE | Facility: CLINIC | Age: 63
End: 2018-09-24

## 2018-09-24 NOTE — TELEPHONE ENCOUNTER
Reason for Call:  Other call back    Detailed comments: Inessa has some question in regards to her past  visit and her instructions    Phone Number Patient can be reached at: Cell number on file:    Telephone Information:   Mobile [phone]       Best Time: any    Can we leave a detailed message on this number? YES    Call taken on 9/24/2018 at 9:25 AM by Joselyn Minor

## 2018-09-24 NOTE — TELEPHONE ENCOUNTER
Called patient @ # below    Stated on patient's AVS from 09/10/2018 - stated there were 2 prescriptions on there for fish oil.   Med List updated    Also requesting lab results from 09/11/2018  Routing to PCP for further review/recommendations/orders.  Please review and advise    Arabella Washington RN  [place] [facility]

## 2018-09-25 NOTE — TELEPHONE ENCOUNTER
Patient notified by phone.  She verbalized understanding and agreed with plan.    Roseline Whitehead, BS, RN, PHN  [facility]) [phone]

## 2018-09-25 NOTE — TELEPHONE ENCOUNTER
Recent Labs   Lab Test  09/11/18   0936  03/01/18   1042   CHOL  179  188   HDL  48*  60   LDL  72  107*   TRIG  293*  106      LDL looks good now. Liver functions look good = normal. Please inform patient.     Recommend staying with current regimen of fish oil - I had her taking 2-1000mg tabs daily. Stay with simvastatin 40mg nightly.  recheck fasting lipids, ast , alt (liver function tests)  in 6 mos at her annual px after 3/1/2019.      Sorry for any confusion, the duplicate fish oil was historic and has now been deleted.

## 2018-11-15 ENCOUNTER — TELEPHONE (OUTPATIENT)
Dept: FAMILY MEDICINE | Facility: CLINIC | Age: 63
End: 2018-11-15

## 2018-11-15 DIAGNOSIS — F33.41 DEPRESSION, MAJOR, RECURRENT, IN PARTIAL REMISSION (H): ICD-10-CM

## 2018-11-15 RX ORDER — CITALOPRAM HYDROBROMIDE 20 MG/1
20 TABLET ORAL DAILY
Qty: 7 TABLET | Refills: 0 | Status: SHIPPED | OUTPATIENT
Start: 2018-11-15 | End: 2019-05-30

## 2018-11-15 NOTE — TELEPHONE ENCOUNTER
Patient called and said she needs short term supply of Citalopram to tide her over until her mail order supply arrives.    7 day supply sent to our clinic pharmacy per patient request.    MESSI Nicole, RN, N  [facility]) [phone]

## 2018-11-29 ENCOUNTER — OFFICE VISIT (OUTPATIENT)
Dept: FAMILY MEDICINE | Facility: CLINIC | Age: 63
End: 2018-11-29
Payer: COMMERCIAL

## 2018-11-29 VITALS
TEMPERATURE: 97 F | SYSTOLIC BLOOD PRESSURE: 124 MMHG | BODY MASS INDEX: 30.01 KG/M2 | HEIGHT: 68 IN | DIASTOLIC BLOOD PRESSURE: 72 MMHG | OXYGEN SATURATION: 97 % | HEART RATE: 88 BPM | WEIGHT: 198 LBS

## 2018-11-29 DIAGNOSIS — F99 INSOMNIA DUE TO OTHER MENTAL DISORDER: ICD-10-CM

## 2018-11-29 DIAGNOSIS — F33.41 DEPRESSION, MAJOR, RECURRENT, IN PARTIAL REMISSION (H): ICD-10-CM

## 2018-11-29 DIAGNOSIS — D18.01 CHERRY HEMANGIOMA: ICD-10-CM

## 2018-11-29 DIAGNOSIS — F51.05 INSOMNIA DUE TO OTHER MENTAL DISORDER: ICD-10-CM

## 2018-11-29 DIAGNOSIS — L72.3 SEBACEOUS CYST: Primary | ICD-10-CM

## 2018-11-29 PROCEDURE — 11400 EXC TR-EXT B9+MARG 0.5 CM<: CPT | Performed by: PHYSICIAN ASSISTANT

## 2018-11-29 PROCEDURE — 11401 EXC TR-EXT B9+MARG 0.6-1 CM: CPT | Performed by: PHYSICIAN ASSISTANT

## 2018-11-29 PROCEDURE — 99213 OFFICE O/P EST LOW 20 MIN: CPT | Mod: 25 | Performed by: PHYSICIAN ASSISTANT

## 2018-11-29 RX ORDER — TRAZODONE HYDROCHLORIDE 100 MG/1
100 TABLET ORAL
Qty: 90 TABLET | Refills: 3 | Status: SHIPPED | OUTPATIENT
Start: 2018-11-29 | End: 2019-09-09

## 2018-11-29 NOTE — PROGRESS NOTES
SUBJECTIVE:   Inessa Castaneda is a 63 year old female who presents to clinic today for the following health issues:      Concern - Would like Skin Check 2 on back 1 on [place]    Chin  Inessa notes notes the bump on her chin that has been present as long as she can remember. Denies any problems.     Midline Back  Inessa reports the mass at midline was removed 10 years ago but has returned.It is irritated as it keeps catching on her bra. She reports no prior complications with removal.  It is not causing pain or itching.     Right Scapular  Inessa points out a pedunculated red mass at her right scapular region. This catches on her bra as well and has been irritating. nIessa denies any personal or family history of skin cancer.     Problem list and histories reviewed & adjusted, as indicated.  Additional history: as documented    Patient Active Problem List   Diagnosis     Depression, major, recurrent, in partial remission (H)     Hyperlipidemia LDL goal <130     Adenomatous colon polyp-at age 52, then normal colonoscopy at age 55 and then at age 60- needs repeat colonoscopy 1/2021      Urinary urgency     DVT (deep venous thrombosis) (H)     Genital herpes simplex, unspecified site     Cystocele     Family history of thyroid disorder- mother and sister     Non morbid obesity due to excess calories     Asymptomatic postmenopausal status     Urge incontinence of urine     Family history of osteoporosis- mother with signif. 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     benign     BUNIONECTOMY Left 1/21/2008    Dr. Alberto Laws - left foot medial and lateral with left 2nd toe surgery      SLING  BLADDER SUSPENSION WITH FASCIA SULAIMAN  2008    - Dr. Emanuel Paz - had DVT afterward      XR ANKLE SURGERY NIXON LEFT  2008    Dr. Alberto Laws -arthroscopy ankle s/p fall - not fused        Social History   Substance Use Topics     Smoking status: Never Smoker     Smokeless tobacco: Never Used     Alcohol use 0.0 oz/week     0 Standard drinks or equivalent per week      Comment: 2 times/month  - 1-2 glasses of wine      Family History   Problem Relation Age of Onset     Cerebrovascular Disease Mother 88     tiny strokes - TIA -  at age 90     Hypertension Mother      Alzheimer Disease Father 81      age 89     Heart Disease Father 63     s/p MI - first at age 63 , then s/p 4 vessel CABG     Mental Illness Sister      bipolar disorder -with heroin addiction - on methadone - no contact for years      Mental Illness Sister      bipolar - 2 with hoarding tendencies      Mental Illness Brother       after fall while drunk - alcoholic      Vascular Disease Brother      dissecting thoracic aortic aneurysm      Hepatitis Brother      Hep C - treated     Mental Illness Sister      hoarding tendencies     Substance Abuse Sister      Opiates      Attention Deficit Disorder Daughter      Substance Abuse Daughter      adderall that was not prescribed         Current Outpatient Prescriptions   Medication Sig Dispense Refill     calcium carbonate-vitamin D (CALCIUM 600+D) 600-200 MG-UNIT TABS Take 1 tablet by mouth       citalopram (CELEXA) 20 MG tablet Take 1 tablet (20 mg) by mouth daily 7 tablet 0     omega 3 1000 MG CAPS Take 2 g by mouth daily 180 capsule 1     simvastatin (ZOCOR) 40 MG tablet one tab every evening 90 tablet 1     traZODone (DESYREL) 100 MG tablet Take 1 tablet (100 mg) by mouth nightly as needed for sleep 90 tablet 3     valACYclovir (VALTREX) 500 MG tablet Take 1 tablet (500 mg) by mouth 2 times daily 6 tablet 11     [DISCONTINUED] citalopram (CELEXA) 20 MG tablet Take 1 tablet  "(20 mg) by mouth daily 90 tablet 1     [DISCONTINUED] traZODone (DESYREL) 100 MG tablet Take 1 tablet (100 mg) by mouth nightly as needed for sleep 90 tablet 3     Allergies   Allergen Reactions     Sulfa Drugs      rash       Reviewed and updated as needed this visit by clinical staff  Tobacco  Allergies  Meds  Problems  Med Hx  Surg Hx  Fam Hx  Soc Hx        Reviewed and updated as needed this visit by Provider  Tobacco  Allergies  Meds  Problems  Med Hx  Surg Hx  Fam Hx  Soc Hx          ROS:  Constitutional, HEENT, cardiovascular, pulmonary, GI, , musculoskeletal, neuro, skin, endocrine and psych systems are negative, except as otherwise noted.    OBJECTIVE:     /72  Pulse 88  Temp 97  F (36.1  C) (Tympanic)  Ht 5' 7.5\" (1.715 m)  Wt 198 lb (89.8 kg)  SpO2 97%  Breastfeeding? No  BMI 30.55 kg/m2  Body mass index is 30.55 kg/(m^2).  GENERAL: healthy, alert and no distress  MS: no gross musculoskeletal defects noted, no edema  SKIN: 2 mm skin colored lesion on left side of chin. 7mm fluctuant mass just right of midline at level of t12 consistent with a sebaceous cyst. 3 mm pedunculated red mass at right scapula consistent with a cherry hemangioma.   NEURO: Normal strength and tone, mentation intact and speech normal  PSYCH: mentation appears normal, affect normal/bright    Diagnostic Test Results:  none       PROCEDURE:   After discussing the risks, benefits and alternatives to a sebaceous cyst removal procedure - the patient elected to proceed with this procedure.  The area was prepped with a betadine solution.  The area was anesthetized with 2.5 cc's of 1% lidocaine with epinephrine.  Using a sterile technique and a #11 blade, the cyst was removed in its entirety including the sac.  The incision was then closed with two 4-0 nylon sutures.  The patient tolerated this procedure well and there were no immediate adverse effects.  The area was then cleaned and bandaged with antibiotic " ointment.  Wound care instructions was explained in detail and a printout of instructions were given to the patient.    PROCEDURE:   After discussing the risks, benefits and alternatives to a cherry hemangioma removal - the patient elected to proceed with this procedure.  The area was prepped with a betadine solution.  The area was anesthetized with 0.5cc's of 1% lidocaine.  Using a sterile technique and a #11 blade, the hemangioma was removed at the base. The area was cauterized with silver nitrate.  The patient tolerated this procedure well and there were no immediate adverse effects.  The area was then cleaned and bandaged with antibiotic ointment.  Wound care instructions was explained in detail and a printout of instructions were given to the patient.      ASSESSMENT/PLAN:       Inessa was seen today for derm problem.    Diagnoses and all orders for this visit:    Sebaceous cyst, Cherry hemangioma  Excised in office today. No complications. Patient encouraged to return for suture removal in 7 days. He was educated on proper hygiene for the area and encouraged to contact clinic if she has any pain or redness from the site. Patient verbalized understanding.    -     EXC BENIGN SKIN LESION TRUNK/ARM/LEG 0.6-1.0 CM    Depression, major, recurrent, in partial remission (H), Insomnia due to other mental disorder- mild depression/anxiety   Well controlled with citalopram and trazodone. Medication refilled.   -     traZODone (DESYREL) 100 MG tablet; Take 1 tablet (100 mg) by mouth nightly as needed for sleep        Return in about 7 days (around 12/6/2018) for suture removal .      Doris Cruz PA-C  [facility]

## 2018-11-29 NOTE — MR AVS SNAPSHOT
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      Who to contact     If you have questions or need follow up information about today's clinic visit or your schedule please contact [facility] directly at [phone].  Normal or non-critical lab and imaging results will be communicated to you by MyChart, letter or phone within 4 business days after the clinic has received the results. If you do not hear from us within 7 days, please contact the clinic through MyChart or phone. If you have a critical or abnormal lab result, we will notify you by phone as soon as possible.  Submit refill requests through [employer] or call your pharmacy and they will forward the refill request to us. Please allow 3 business days for your refill to be completed.          Additional Information About Your Visit        MyChart Information     [employer] lets you send messages to your doctor, view your test results, renew your prescriptions, schedule appointments and more. To sign up, go to [email or web address] . Click on \"Log in\" on the left side of the screen, which will take you to the Welcome page. Then click on \"Sign up Now\" on the right side of the page.     You will be asked to enter the access code listed below, as well as some personal information. Please follow the directions to create your username and password.     Your " "access code is: [email or web address]  Expires: 2018  9:33 AM     Your access code will  in 90 days. If you need help or a new code, please call your [place] clinic or [phone].        Care EveryWhere ID     This is your Care EveryWhere ID. This could be used by other organizations to access your [place] medical records  [id number]        Your Vitals Were     Pulse Temperature Height Pulse Oximetry Breastfeeding? BMI (Body Mass Index)    88 97  F (36.1  C) (Tympanic) 5' 7.5\" (1.715 m) 97% No 30.55 kg/m2       Blood Pressure from Last 3 Encounters:   18 124/72   09/10/18 131/80   18 120/77    Weight from Last 3 Encounters:   18 198 lb (89.8 kg)   09/10/18 203 lb (92.1 kg)   18 200 lb 6.4 oz (90.9 kg)              We Performed the Following     EXC BENIGN SKIN LESION TRUNK/ARM/LEG 0.6-1.0 CM          Where to get your medicines      These medications were sent to [facility] - [address], [facility] [address]     Phone:  [phone]     traZODone 100 MG tablet          Primary Care Provider Office Phone # Fax #    Gabrieladenny Moore [phone]       [address] [place] [address]        Equal Access to Services     [facility] Medical CenterFELICIA AH: Hadii aad ku hadasho Soomaali, waaxda luqadaha, qaybta kaalmada adeegyada, bertha chandler . So [facility] [phone].    ATENCIÓN: Si habla español, tiene a randhawa disposición servicios gratuitos de asistencia lingüística. Llame al [phone].    We comply with applicable federal civil rights laws and [place] laws. We do not discriminate on the basis of race, color, national origin, age, disability, sex, sexual orientation, or gender identity.            Thank you!     Thank you for choosing [facility]  for your care. Our goal is always to provide you with excellent care. Hearing back from our patients is one way we can " continue to improve our services. Please take a few minutes to complete the written survey that you may receive in the mail after your visit with us. Thank you!             Your Updated Medication List - Protect others around you: Learn how to safely use, store and throw away your medicines at www.disposemymeds.org.          This list is accurate as of 11/29/18  4:07 PM.  Always use your most recent med list.                   Brand Name Dispense Instructions for use Diagnosis    CALCIUM 600+D 600-200 MG-UNIT Tabs   Generic drug:  calcium carbonate-vitamin D      Take 1 tablet by mouth    Encounter for routine adult medical exam with abnormal findings       citalopram 20 MG tablet    celeXA    7 tablet    Take 1 tablet (20 mg) by mouth daily    Depression, major, recurrent, in partial remission (H)       omega 3 1000 MG Caps     180 capsule    Take 2 g by mouth daily    Hyperlipidemia LDL goal <130       simvastatin 40 MG tablet    ZOCOR    90 tablet    one tab every evening    Hyperlipidemia LDL goal <130       traZODone 100 MG tablet    DESYREL    90 tablet    Take 1 tablet (100 mg) by mouth nightly as needed for sleep    Depression, major, recurrent, in partial remission (H), Insomnia due to other mental disorder       valACYclovir 500 MG tablet    VALTREX    6 tablet    Take 1 tablet (500 mg) by mouth 2 times daily    Genital herpes simplex, unspecified site

## 2018-11-29 NOTE — PROGRESS NOTES
"  SUBJECTIVE:   Inessa Castaneda is a 63 year old female who presents to clinic today for the following health issues.    Cyst    Onset: {TIME FRAME :120598}    Description:   Location: ***  Character: {RASH APPEARANCE:689024}        Color: {COLOR:634164::\"brown\"}        Border description: {description:5756}    History:    Has it spread/changed:  {.:662229::\"no\"}  Any previous history of skin cancer: {.:204947::\"no\"}  Any family history of melanoma: {.:699774::\"no\"}    Accompanying Signs & Symptoms:   Fever: {.:785743::\"no\"}  Bleeding: {.:878950::\"no\"}  Scaling: {.:601674::\"no\"}  Excessive sun exposure/tanning: {.:152697::\"no\"}  Sunscreen used: {.:571188::\"no\"}  Contact with known allergens: {ALLERGY RESULTS:466325}    Therapies tried and outcome:  *** with {RELIEF:486445} relief          Problem list and histories reviewed & adjusted, as indicated.  Additional history: as documented    Patient Active Problem List   Diagnosis     Depression, major, recurrent, in partial remission (H)     Hyperlipidemia LDL goal <130     Adenomatous colon polyp-at age 52, then normal colonoscopy at age 55 and then at age 60- needs repeat colonoscopy 1/2021      Urinary urgency     DVT (deep venous thrombosis) (H)     Genital herpes simplex, unspecified site     Cystocele     Family history of thyroid disorder- mother and sister     Non morbid obesity due to excess calories     Asymptomatic postmenopausal status     Urge incontinence of urine     Family history of osteoporosis- mother with signif. 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     benign     BUNIONECTOMY Left 1/21/2008    Dr. Alberto Laws - left foot " medial and lateral with left 2nd toe surgery      SLING BLADDER SUSPENSION WITH FASCIA SULAIMAN  2008    - Dr. Emanuel Paz - had DVT afterward      XR ANKLE SURGERY NIXON LEFT  2008    Dr. Alberto Laws -arthroscopy ankle s/p fall - not fused        Social History   Substance Use Topics     Smoking status: Never Smoker     Smokeless tobacco: Never Used     Alcohol use 0.0 oz/week     0 Standard drinks or equivalent per week      Comment: 2 times/month  - 1-2 glasses of wine      Family History   Problem Relation Age of Onset     Cerebrovascular Disease Mother 88     tiny strokes - TIA -  at age 90     Hypertension Mother      Alzheimer Disease Father 81      age 89     Heart Disease Father 63     s/p MI - first at age 63 , then s/p 4 vessel CABG     Mental Illness Sister      bipolar disorder -with heroin addiction - on methadone - no contact for years      Mental Illness Sister      bipolar - 2 with hoarding tendencies      Mental Illness Brother       after fall while drunk - alcoholic      Vascular Disease Brother      dissecting thoracic aortic aneurysm      Hepatitis Brother      Hep C - treated     Mental Illness Sister      hoarding tendencies     Substance Abuse Sister      Opiates      Attention Deficit Disorder Daughter      Substance Abuse Daughter      adderall that was not prescribed         Current Outpatient Prescriptions   Medication Sig Dispense Refill     calcium carbonate-vitamin D (CALCIUM 600+D) 600-200 MG-UNIT TABS Take 1 tablet by mouth       citalopram (CELEXA) 20 MG tablet Take 1 tablet (20 mg) by mouth daily 7 tablet 0     citalopram (CELEXA) 20 MG tablet Take 1 tablet (20 mg) by mouth daily 90 tablet 1     omega 3 1000 MG CAPS Take 2 g by mouth daily 180 capsule 1     simvastatin (ZOCOR) 40 MG tablet one tab every evening 90 tablet 1     traZODone (DESYREL) 100 MG tablet Take 1 tablet (100 mg) by mouth nightly as needed for sleep 90 tablet 3     valACYclovir  "(VALTREX) 500 MG tablet Take 1 tablet (500 mg) by mouth 2 times daily 6 tablet 11     Allergies   Allergen Reactions     Sulfa Drugs      rash     Reviewed and updated as needed this visit by clinical staff       Reviewed and updated as needed this visit by Provider       ROS:  Constitutional, HEENT, cardiovascular, pulmonary, GI, , musculoskeletal, neuro, skin, endocrine and psych systems are negative, except as otherwise noted.    This document serves as a record of the services and decisions personally performed and made by Doris Cruz MS, PA-C. It was created on her behalf by Regina Stoner, a trained medical scribe. The creation of this document is based on the provider's statements to the medical scribe.  Regina Stoner November 29, 2018 *** AM    OBJECTIVE:   There were no vitals taken for this visit.  There is no height or weight on file to calculate BMI.    {Brief/partially selected :233393::\"GENERAL: healthy, alert and no distress\",\"NECK: no adenopathy, no asymmetry, masses, or scars and thyroid normal to palpation\",\"RESP: lungs clear to auscultation - no rales, rhonchi or wheezes\",\"CV: regular rate and rhythm, normal S1 S2, no S3 or S4, no murmur, click or rub, no peripheral edema and peripheral pulses strong\",\"ABDOMEN: soft, nontender, no hepatosplenomegaly, no masses and bowel sounds normal\",\"MS: no gross musculoskeletal defects noted, no edema\"}    {Diagnostic Test Results:955764::\"Diagnostic Test Results:\",\"none \"}    No results found for this or any previous visit (from the past 24 hour(s)).  ASSESSMENT/PLAN:   There are no diagnoses linked to this encounter.    No Follow-up on file.    The information in this document, created by the medical scribe for me, accurately reflects the services I personally performed and the decisions made by me. I have reviewed and approved this document for accuracy prior to leaving the patient care area.  November 29, 2018 *** AM    Doris Cruz MS, " JHON  [facility]

## 2018-12-07 ENCOUNTER — NURSE TRIAGE (OUTPATIENT)
Dept: NURSING | Facility: CLINIC | Age: 63
End: 2018-12-07

## 2018-12-07 NOTE — TELEPHONE ENCOUNTER
Callers states she was to have 2 sutures removed from her back after a cyst;   Was to have suture removed after 7 days;   Advised to have them  Removed a next week   Call if looks infected   Kacie Ocampo RN  FNA    Additional Information    Negative: [1] Major abdominal surgical wound AND [2] visible internal organs    Negative: Sounds like a life-threatening emergency to the triager    Negative: Surgical incision symptoms and questions    Negative: New cut and caller wonders if it needs stitches    Negative: Skin glue (Dermabond) questions    Negative: Wound looks infected    Negative: [1] Bleeding from wound AND [2] won't stop after 10 minutes of direct pressure    Negative: [1] Suture came out early AND [2] wound gaping AND [3] < 48 hours since sutures placed    Negative: Patient sounds very sick or weak to the triager    Negative: Suture removal is overdue    Suture removal date, questions about    Protocols used: SUTURE OR STAPLE QUESTIONS-ADULT-

## 2019-01-08 ENCOUNTER — TELEPHONE (OUTPATIENT)
Dept: FAMILY MEDICINE | Facility: CLINIC | Age: 64
End: 2019-01-08

## 2019-01-08 DIAGNOSIS — F33.41 DEPRESSION, MAJOR, RECURRENT, IN PARTIAL REMISSION (H): ICD-10-CM

## 2019-01-08 DIAGNOSIS — F99 INSOMNIA DUE TO OTHER MENTAL DISORDER: Primary | ICD-10-CM

## 2019-01-08 DIAGNOSIS — F51.05 INSOMNIA DUE TO OTHER MENTAL DISORDER: Primary | ICD-10-CM

## 2019-01-08 NOTE — LETTER
[address] [address]                                                                                                       [phone]    January 9, 2019    Inessa Castaneda  [address] [address] [facility] [address]      To Whom it May Concern:    After reviewing your chart, you are due for an updated PHQ-9 and MIGEL-7, which are questionnaires regarding your mood over the last 2 weeks.     Please fill them out and send it back to me.     Thank you for your time.           Sincerely,       Gabriela Moore M.D./GRISELDA, RN

## 2019-01-08 NOTE — TELEPHONE ENCOUNTER
Pt is due now to update PHQ9.  Please call pt and update phq 9. Follow up end date 4/9/19.   PHQ-9 SCORE 5/14/2018 8/9/2018 9/10/2018   PHQ-9 Total Score 17 10 7     Socrates VASQUEZ CMA

## 2019-01-09 NOTE — TELEPHONE ENCOUNTER
Letter sent with PHQ9/GAD7 and self-addressed, stamped, return envelope    Arabella Washington RN  [facility]

## 2019-01-31 ASSESSMENT — ANXIETY QUESTIONNAIRES
2. NOT BEING ABLE TO STOP OR CONTROL WORRYING: SEVERAL DAYS
3. WORRYING TOO MUCH ABOUT DIFFERENT THINGS: SEVERAL DAYS
1. FEELING NERVOUS, ANXIOUS, OR ON EDGE: SEVERAL DAYS
GAD7 TOTAL SCORE: 4
7. FEELING AFRAID AS IF SOMETHING AWFUL MIGHT HAPPEN: NOT AT ALL
IF YOU CHECKED OFF ANY PROBLEMS ON THIS QUESTIONNAIRE, HOW DIFFICULT HAVE THESE PROBLEMS MADE IT FOR YOU TO DO YOUR WORK, TAKE CARE OF THINGS AT HOME, OR GET ALONG WITH OTHER PEOPLE: NOT DIFFICULT AT ALL
6. BECOMING EASILY ANNOYED OR IRRITABLE: SEVERAL DAYS
5. BEING SO RESTLESS THAT IT IS HARD TO SIT STILL: NOT AT ALL

## 2019-01-31 ASSESSMENT — PATIENT HEALTH QUESTIONNAIRE - PHQ9
5. POOR APPETITE OR OVEREATING: NOT AT ALL
SUM OF ALL RESPONSES TO PHQ QUESTIONS 1-9: 9

## 2019-01-31 NOTE — TELEPHONE ENCOUNTER
Received PHQ9/GAD7 in the mail, input responses into chart.    PHQ-9 SCORE 8/9/2018 9/10/2018 1/31/2019   PHQ-9 Total Score 10 7 9     MIGEL-7 SCORE 8/9/2018 9/10/2018 1/31/2019   Total Score 2 4 4     Routing to [facility] Triage for review.    Sarah Palacios, CMA

## 2019-01-31 NOTE — TELEPHONE ENCOUNTER
Routing to PCP for further review/recommendations/orders.    Arabella Washington RN  [facility]

## 2019-02-01 RX ORDER — CITALOPRAM HYDROBROMIDE 20 MG/1
20 TABLET ORAL DAILY
Qty: 90 TABLET | Refills: 1 | COMMUNITY
Start: 2019-02-01 | End: 2019-07-15

## 2019-02-01 ASSESSMENT — ANXIETY QUESTIONNAIRES: GAD7 TOTAL SCORE: 4

## 2019-02-01 NOTE — TELEPHONE ENCOUNTER
Pt is currently on citalopram 20mg po daily.  This is max dose for this medication secondary to age and rare  heart rhythm issues in some people.    If pt desire, we could change her to escitalopram = lexapro at 20mg = equivalent of 40mg of citalopram without the dysrthythmia risk to better control her depression/anxiety.     Please offer that to her.

## 2019-02-01 NOTE — TELEPHONE ENCOUNTER
Called patient @ [phone]    Advised of MD MIN message below -   Patient states she has tried Lexapro in the past, has tried counseling, Is working on positive self-talk.   Patient stated JHON NGUYEN started patient on trazodone so she doesn't want to change anything at this time.     Advised patient that if new or worsening symptoms appear (reviewed new & worsening symptoms) to call the clinic or be seen in the the ER  Patient stated an understanding and agreed with plan.    Arabella Washington RN  [facility]

## 2019-02-15 DIAGNOSIS — E78.5 HYPERLIPIDEMIA LDL GOAL <130: ICD-10-CM

## 2019-02-16 NOTE — TELEPHONE ENCOUNTER
"Requested Prescriptions   Pending Prescriptions Disp Refills     simvastatin (ZOCOR) 40 MG tablet [Pharmacy Med Name: SIMVASTATIN  40MG  TAB] 90 tablet 1      Last Written Prescription Date:  9/10/18  Last Fill Quantity:90,  # refills: 1   Last office visit: 11/29/2018 with prescribing provider:     Future Office Visit:       Sig: TAKE 1 TABLET BY MOUTH  EVERY EVENING    Statins Protocol Passed - 2/15/2019  8:13 PM       Passed - LDL on file in past 12 months    Recent Labs   Lab Test 09/11/18  0936   LDL 72            Passed - No abnormal creatine kinase in past 12 months    Recent Labs   Lab Test 08/09/18  1425                  Passed - Recent (12 mo) or future (30 days) visit within the authorizing provider's specialty    Patient had office visit in the last 12 months or has a visit in the next 30 days with authorizing provider or within the authorizing provider's specialty.  See \"Patient Info\" tab in inbasket, or \"Choose Columns\" in Meds & Orders section of the refill encounter.             Passed - Medication is active on med list       Passed - Patient is age 18 or older       Passed - No active pregnancy on record       Passed - No positive pregnancy test in past 12 months          "

## 2019-02-18 RX ORDER — SIMVASTATIN 40 MG
TABLET ORAL
Qty: 90 TABLET | Refills: 0 | Status: SHIPPED | OUTPATIENT
Start: 2019-02-18 | End: 2019-03-14

## 2019-02-18 NOTE — TELEPHONE ENCOUNTER
Prescription approved per [facility] Refill Protocol.  Amber Dickerson RN  [facility]

## 2019-03-14 DIAGNOSIS — E78.5 HYPERLIPIDEMIA LDL GOAL <130: ICD-10-CM

## 2019-03-14 DIAGNOSIS — F33.41 DEPRESSION, MAJOR, RECURRENT, IN PARTIAL REMISSION (H): ICD-10-CM

## 2019-03-14 RX ORDER — SIMVASTATIN 40 MG
TABLET ORAL
Qty: 90 TABLET | Refills: 0 | Status: SHIPPED | OUTPATIENT
Start: 2019-03-14 | End: 2019-06-24

## 2019-03-14 RX ORDER — CITALOPRAM HYDROBROMIDE 20 MG/1
TABLET ORAL
Qty: 30 TABLET | Refills: 0 | Status: SHIPPED | OUTPATIENT
Start: 2019-03-14 | End: 2019-06-24

## 2019-03-14 NOTE — TELEPHONE ENCOUNTER
Cardionet event monitor results from 11/15/17 showin: symptomatic event, sinus rhythm, HR 64 bpm, no activities or symptoms indicated      1617: automatic event, sinus rhythm, HR 73 bpm, no activities or symptoms indicated        Cardionet event monitor results from 17 showin: automatic event, sinus rhythm with atrial run, HR 76 bpm, no activities or symptoms indicated    Refill per RN protocol     Madeline Lewis RN, BSN  [place] [facility]

## 2019-03-14 NOTE — TELEPHONE ENCOUNTER
"Requested Prescriptions   Pending Prescriptions Disp Refills     citalopram (CELEXA) 20 MG tablet [Pharmacy Med Name: CITALOPRAM  20MG  TAB]  Last Written Prescription Date:  02/01/2019  Last Fill Quantity: 90 tablet,  # refills: 1   Last office visit: 11/29/2018 with prescribing provider:  Doris Cruz PA-C    Future Office Visit:     90 tablet 1     Sig: TAKE 1 TABLET BY MOUTH  DAILY    SSRIs Protocol Failed - 3/14/2019  9:13 AM       Failed - PHQ-9 score less than 5 in past 6 months    Please review last PHQ-9 score.   PHQ-9 SCORE 8/9/2018 9/10/2018 1/31/2019   PHQ-9 Total Score 10 7 9     MIGEL-7 SCORE 8/9/2018 9/10/2018 1/31/2019   Total Score 2 4 4              Passed - Medication is active on med list       Passed - Patient is age 18 or older       Passed - No active pregnancy on record       Passed - No positive pregnancy test in last 12 months       Passed - Recent (6 mo) or future (30 days) visit within the authorizing provider's specialty    Patient had office visit in the last 6 months or has a visit in the next 30 days with authorizing provider or within the authorizing provider's specialty.  See \"Patient Info\" tab in inbasket, or \"Choose Columns\" in Meds & Orders section of the refill encounter.            simvastatin (ZOCOR) 40 MG tablet [Pharmacy Med Name: SIMVASTATIN  40MG  TAB]  Last Written Prescription Date:  02/18/2019  Last Fill Quantity: 90 tablet,  # refills: 0   Last office visit: 11/29/2018 with prescribing provider:  Doris Cruz PA-C    Future Office Visit:     90 tablet 0     Sig: TAKE 1 TABLET BY MOUTH  EVERY EVENING    Statins Protocol Passed - 3/14/2019  9:13 AM       Passed - LDL on file in past 12 months    Recent Labs   Lab Test 09/11/18  0936   LDL 72            Passed - No abnormal creatine kinase in past 12 months    Recent Labs   Lab Test 08/09/18  1425                  Passed - Recent (12 mo) or future (30 days) visit within the authorizing provider's " "specialty    Patient had office visit in the last 12 months or has a visit in the next 30 days with authorizing provider or within the authorizing provider's specialty.  See \"Patient Info\" tab in inbasket, or \"Choose Columns\" in Meds & Orders section of the refill encounter.             Passed - Medication is active on med list       Passed - Patient is age 18 or older       Passed - No active pregnancy on record       Passed - No positive pregnancy test in past 12 months          "

## 2019-05-30 DIAGNOSIS — F33.41 DEPRESSION, MAJOR, RECURRENT, IN PARTIAL REMISSION (H): ICD-10-CM

## 2019-05-30 RX ORDER — CITALOPRAM HYDROBROMIDE 20 MG/1
20 TABLET ORAL DAILY
Qty: 90 TABLET | Refills: 0 | Status: SHIPPED | OUTPATIENT
Start: 2019-05-30 | End: 2019-07-15

## 2019-05-30 NOTE — TELEPHONE ENCOUNTER
Reason for Call:  Other prescription    Detailed comments: Pt called this morning and would like a refill on her celexa. Please refill this and give patient a call if there are any questions or concerns. Thank you.    Phone Number Patient can be reached at: Home number on file [phone] (home)    Best Time:     Can we leave a detailed message on this number? YES    Call taken on 5/30/2019 at 9:32 AM by Mae Crouch

## 2019-05-30 NOTE — TELEPHONE ENCOUNTER
No future appts in epic.   I haven't seen this pt since 9/10/2018.    Ok to do #90 tabs until pt can get in. Absolutely no further refills without being seen. Please inform patient.

## 2019-05-30 NOTE — TELEPHONE ENCOUNTER
"Last Written Prescription Date:  3/14/2019  Last Fill Quantity: 30,  # refills: 0   Last office visit: 11/29/2018 with prescribing provider:  No   Future Office Visit:        Requested Prescriptions   Pending Prescriptions Disp Refills     citalopram (CELEXA) 20 MG tablet 7 tablet 0     Sig: Take 1 tablet (20 mg) by mouth daily       SSRIs Protocol Failed - 5/30/2019 12:34 PM        Failed - PHQ-9 score less than 5 in past 6 months     Please review last PHQ-9 score.           Failed - Recent (6 mo) or future (30 days) visit within the authorizing provider's specialty     Patient had office visit in the last 6 months or has a visit in the next 30 days with authorizing provider or within the authorizing provider's specialty.  See \"Patient Info\" tab in inbasket, or \"Choose Columns\" in Meds & Orders section of the refill encounter.            Passed - Medication is active on med list        Passed - Patient is age 18 or older        Passed - No active pregnancy on record        Passed - No positive pregnancy test in last 12 months          PHQ-9 SCORE 8/9/2018 9/10/2018 1/31/2019   PHQ-9 Total Score 10 7 9         MIGEL-7 SCORE 8/9/2018 9/10/2018 1/31/2019   Total Score 2 4 4       Routing refill request to provider for review/approval because:  Zuly given x1 and patient did not follow up, please advise  PHQ-9 is out of range to fill per protocol.    Roseline Whitehead, BS, RN, PHN  [facility] [phone]            "

## 2019-06-11 ENCOUNTER — TELEPHONE (OUTPATIENT)
Dept: FAMILY MEDICINE | Facility: CLINIC | Age: 64
End: 2019-06-11

## 2019-06-11 DIAGNOSIS — A60.00 GENITAL HERPES SIMPLEX, UNSPECIFIED SITE: ICD-10-CM

## 2019-06-11 NOTE — TELEPHONE ENCOUNTER
Reason for call:  Patient reporting a symptom    Symptom or request: Wondering if she can use the ointment if it's . Still feeling pressure down there like there's still a sore, but no visible one.Or should she go back to using the pills    Duration (how long have symptoms been present): ongoing    Have you been treated for this before? Yes    Additional comments: Please call her back to advise.    Phone Number patient can be reached at:  Cell number on file:    Telephone Information:   Mobile [phone]       Best Time:  any    Can we leave a detailed message on this number:  YES    Call taken on 2019 at 5:05 PM by Alexa Valdez

## 2019-06-12 RX ORDER — VALACYCLOVIR HYDROCHLORIDE 500 MG/1
500 TABLET, FILM COATED ORAL 2 TIMES DAILY
Qty: 6 TABLET | Refills: 1 | Status: SHIPPED | OUTPATIENT
Start: 2019-06-12 | End: 2019-09-09

## 2019-06-12 NOTE — TELEPHONE ENCOUNTER
Patient calling reports that Friday felt the onset of herpes outbreak.  States that she had some left over cream as she only gets about 1 outbreak a year and so now it is  and she was using that.  Still has a sensation down there like it is not gone.      Chart reviewed and patient does have Valtrex on file.  Refill pended for patient    Creatinine   Date Value Ref Range Status   2018 0.78 0.52 - 1.04 mg/dL Final     Prescription approved per [facility], [facility] or [facility] refill protocol.  Suha GOLDBERG RN - Triage  [facility]        Suha GOLDBERG RN - Triage  [facility]

## 2019-06-24 DIAGNOSIS — F33.41 DEPRESSION, MAJOR, RECURRENT, IN PARTIAL REMISSION (H): ICD-10-CM

## 2019-06-24 DIAGNOSIS — E78.5 HYPERLIPIDEMIA LDL GOAL <130: ICD-10-CM

## 2019-06-24 NOTE — TELEPHONE ENCOUNTER
"Requested Prescriptions   Pending Prescriptions Disp Refills     simvastatin (ZOCOR) 40 MG tablet [Pharmacy Med Name: SIMVASTATIN  40MG  TAB] 90 tablet 0     Sig: TAKE 1 TABLET BY MOUTH  EVERY EVENING       Last Written Prescription Date:  3/14/2019  Last Fill Quantity: 90,  # refills: 0   Last office visit: 11/29/2018 with prescribing provider:     Future Office Visit:   Next 5 appointments (look out 90 days)    Sep 09, 2019  1:40 PM CDT  PHYSICAL with Gabriela Moore MD  [facility] [address] [place] [id number] [phone]               Statins Protocol Passed - 6/24/2019  3:30 PM        Passed - LDL on file in past 12 months     Recent Labs   Lab Test 09/11/18  0936   LDL 72             Passed - No abnormal creatine kinase in past 12 months     Recent Labs   Lab Test 08/09/18  1425                   Passed - Recent (12 mo) or future (30 days) visit within the authorizing provider's specialty     Patient had office visit in the last 12 months or has a visit in the next 30 days with authorizing provider or within the authorizing provider's specialty.  See \"Patient Info\" tab in inbasket, or \"Choose Columns\" in Meds & Orders section of the refill encounter.              Passed - Medication is active on med list        Passed - Patient is age 18 or older        Passed - No active pregnancy on record        Passed - No positive pregnancy test in past 12 months        citalopram (CELEXA) 20 MG tablet [Pharmacy Med Name: CITALOPRAM  20MG  TAB] 30 tablet 0     Sig: TAKE 1 TABLET BY MOUTH  DAILY       Last Written Prescription Date:  5/30/2019  Last Fill Quantity: 90,  # refills: 0   Last office visit: 11/29/2018 with prescribing provider:     Future Office Visit:   Next 5 appointments (look out 90 days)    Sep 09, 2019  1:40 PM CDT  PHYSICAL with Gabriela Moore MD  [facility] [address] "[facility] [id number]  [phone]               SSRIs Protocol Failed - 6/24/2019  3:30 PM        Failed - PHQ-9 score less than 5 in past 6 months     Please review last PHQ-9 score.           Failed - Recent (6 mo) or future (30 days) visit within the authorizing provider's specialty     Patient had office visit in the last 6 months or has a visit in the next 30 days with authorizing provider or within the authorizing provider's specialty.  See \"Patient Info\" tab in inbasket, or \"Choose Columns\" in Meds & Orders section of the refill encounter.            Passed - Medication is active on med list        Passed - Patient is age 18 or older        Passed - No active pregnancy on record        Passed - No positive pregnancy test in last 12 months        "

## 2019-06-26 RX ORDER — SIMVASTATIN 40 MG
TABLET ORAL
Qty: 90 TABLET | Refills: 0 | Status: SHIPPED | OUTPATIENT
Start: 2019-06-26 | End: 2019-08-28

## 2019-06-26 NOTE — TELEPHONE ENCOUNTER
PHQ-9 SCORE 8/9/2018 9/10/2018 1/31/2019   PHQ-9 Total Score 10 7 9     Routing refill request to provider for review/approval because:  PHQ-9 > [facility] protocol for RN fill.  Has future appointment scheduled with provider.    90 day fill of Simvastatin approved. 90 day pended for citalopram as patient uses mail order pharmacy  Suha GOLDBERG RN   [facility] [place]

## 2019-06-27 RX ORDER — CITALOPRAM HYDROBROMIDE 20 MG/1
TABLET ORAL
Qty: 90 TABLET | Refills: 0 | Status: SHIPPED | OUTPATIENT
Start: 2019-06-27 | End: 2019-09-09

## 2019-07-05 ENCOUNTER — NURSE TRIAGE (OUTPATIENT)
Dept: FAMILY MEDICINE | Facility: CLINIC | Age: 64
End: 2019-07-05

## 2019-07-05 NOTE — TELEPHONE ENCOUNTER
"She has been pushing fluids.  No painful urination.  Just a feeling of pressure. .  Positive odor.NO red flags No blood. No flank pain. No fever. Offered her appt at . Declined. Also let her know we have openings Saturday here or UC>    She will continue to push fluids and call us if red flag symtpoms arise.     Additional Information    Negative: Shock suspected (e.g., cold/pale/clammy skin, too weak to stand, low BP, rapid pulse)    Negative: Sounds like a life-threatening emergency to the triager    Negative: Unable to urinate (or only a few drops) and bladder feels very full    Negative: Vomiting    Negative: Patient sounds very sick or weak to the triager    Negative: Severe pain with urination    Negative: Fever > 100.5 F (38.1 C)    Negative: Side (flank) or lower back pain present    Negative: Taking antibiotic > 24 hours for UTI and fever persists    Negative: Taking antibiotic > 3 days for UTI and painful urination not improved    Negative: Unusual vaginal discharge    Negative: > 2 UTIs in last year    Negative: Patient is worried about sexually transmitted disease (STD)    Negative: Age > 50 years    Negative: Possibility of pregnancy    Negative: Painful urination AND EITHER frequency or urgency    Negative: All other females with painful urination, or patient wants to be seen    Negative: Taking antibiotic < 24 hours for UTI and fever persists    Negative: Taking antibiotic < 3 days for UTI and painful urination not improved    Answer Assessment - Initial Assessment Questions  1. SEVERITY: \"How bad is the pain?\"  (e.g., Scale 1-10; mild, moderate, or severe)    - MILD (1-3): complains slightly about urination hurting    - MODERATE (4-7): interferes with normal activities      - SEVERE (8-10): excruciating, unwilling or unable to urinate because of the pain       Mild, \"something isn't right\"  2. FREQUENCY: \"How many times have you had painful urination today?\"       Every time  3. PATTERN: \"Is pain " "present every time you urinate or just sometimes?\"       Every time    4. ONSET: \"When did the painful urination start?\"       X 4 days. Staying the same everyday.   5. FEVER: \"Do you have a fever?\" If so, ask: \"What is your temperature, how was it measured, and when did it start?\"      NO  6. PAST UTI: \"Have you had a urine infection before?\" If so, ask: \"When was the last time?\" and \"What happened that time?\"       Have not had one for awhile  7. CAUSE: \"What do you think is causing the painful urination?\"  (e.g., UTI, scratch, Herpes sore)      UTI  8. OTHER SYMPTOMS: \"Do you have any other symptoms?\" (e.g., flank pain, vaginal discharge, genital sores, urgency, blood in urine)      No back pain or blood.  Strange odor  9. PREGNANCY: \"Is there any chance you are pregnant?\" \"When was your last menstrual period?\"      no    Protocols used: URINATION PAIN - FEMALE-A-OH      "

## 2019-07-15 ENCOUNTER — OFFICE VISIT (OUTPATIENT)
Dept: FAMILY MEDICINE | Facility: CLINIC | Age: 64
End: 2019-07-15
Payer: COMMERCIAL

## 2019-07-15 VITALS
WEIGHT: 175 LBS | HEIGHT: 68 IN | HEART RATE: 72 BPM | DIASTOLIC BLOOD PRESSURE: 72 MMHG | BODY MASS INDEX: 26.52 KG/M2 | TEMPERATURE: 98.4 F | SYSTOLIC BLOOD PRESSURE: 118 MMHG | OXYGEN SATURATION: 97 %

## 2019-07-15 DIAGNOSIS — R30.0 DYSURIA: Primary | ICD-10-CM

## 2019-07-15 LAB
ALBUMIN UR-MCNC: NEGATIVE MG/DL
APPEARANCE UR: CLEAR
BACTERIA #/AREA URNS HPF: ABNORMAL /HPF
BILIRUB UR QL STRIP: NEGATIVE
COLOR UR AUTO: YELLOW
GLUCOSE UR STRIP-MCNC: NEGATIVE MG/DL
HGB UR QL STRIP: NEGATIVE
KETONES UR STRIP-MCNC: NEGATIVE MG/DL
LEUKOCYTE ESTERASE UR QL STRIP: ABNORMAL
NITRATE UR QL: NEGATIVE
NON-SQ EPI CELLS #/AREA URNS LPF: ABNORMAL /LPF
PH UR STRIP: 5.5 PH (ref 5–7)
RBC #/AREA URNS AUTO: ABNORMAL /HPF
SOURCE: ABNORMAL
SP GR UR STRIP: 1.02 (ref 1–1.03)
UROBILINOGEN UR STRIP-ACNC: 0.2 EU/DL (ref 0.2–1)
WBC #/AREA URNS AUTO: ABNORMAL /HPF

## 2019-07-15 PROCEDURE — 87086 URINE CULTURE/COLONY COUNT: CPT | Performed by: FAMILY MEDICINE

## 2019-07-15 PROCEDURE — 87088 URINE BACTERIA CULTURE: CPT | Performed by: FAMILY MEDICINE

## 2019-07-15 PROCEDURE — 87186 SC STD MICRODIL/AGAR DIL: CPT | Performed by: FAMILY MEDICINE

## 2019-07-15 PROCEDURE — 81001 URINALYSIS AUTO W/SCOPE: CPT | Performed by: FAMILY MEDICINE

## 2019-07-15 PROCEDURE — 99214 OFFICE O/P EST MOD 30 MIN: CPT | Performed by: FAMILY MEDICINE

## 2019-07-15 RX ORDER — NITROFURANTOIN 25; 75 MG/1; MG/1
100 CAPSULE ORAL 2 TIMES DAILY
Qty: 14 CAPSULE | Refills: 0 | Status: SHIPPED | OUTPATIENT
Start: 2019-07-15 | End: 2019-09-09

## 2019-07-15 RX ORDER — PHENAZOPYRIDINE HYDROCHLORIDE 200 MG/1
200 TABLET, FILM COATED ORAL 3 TIMES DAILY PRN
Qty: 9 TABLET | Refills: 0 | Status: SHIPPED | OUTPATIENT
Start: 2019-07-15 | End: 2019-09-09

## 2019-07-15 ASSESSMENT — MIFFLIN-ST. JEOR: SCORE: 1394.35

## 2019-07-15 NOTE — PROGRESS NOTES
Subjective     Inessa Castaneda is a 64 year old female who presents to clinic today for the following health issues:    HPI   URINARY TRACT SYMPTOMS  Onset: several days.    Description:   Painful urination (Dysuria): no   Blood in urine (Hematuria): no   Delay in urine (Hesitency): YES- and retention, odor and cloudy urine    Intensity: mild    Progression of Symptoms:  same    Accompanying Signs & Symptoms:  Fever/chills: no   Flank pain no   Nausea and vomiting: no   Any vaginal symptoms: none  Abdominal/Pelvic Pain: no     History:   History of frequent UTI's: YES- through the years  History of kidney stones: no   Sexually Active: YES  Possibility of pregnancy: no    Precipitating factors:   Possilby from holding urine and after intercourse not going to the bathroom    Therapies Tried and outcome: Increase fluid intake      Results for orders placed or performed in visit on 09/11/18   Lipid panel reflex to direct LDL Fasting   Result Value Ref Range    Cholesterol 179 <200 mg/dL    Triglycerides 293 (H) <150 mg/dL    HDL Cholesterol 48 (L) >49 mg/dL    LDL Cholesterol Calculated 72 <100 mg/dL    Non HDL Cholesterol 131 (H) <130 mg/dL   **AST FUTURE 2mo   Result Value Ref Range    AST 15 0 - 45 U/L   **ALT FUTURE 2mo   Result Value Ref Range    ALT 22 0 - 50 U/L         OBJECTIVE: Appears well, in no apparent distress.  Vital signs are normal. The abdomen is soft without tenderness, guarding, mass, rebound or organomegaly. No CVA tenderness or inguinal adenopathy noted.    Urine culture is pending.  In addition her urine looks fairly clear other than showing an occasional leukocyte esterase that was positive    We will use some Pyridium discussed how this will make her urine appear orange    She was given Macrobid to hold onto for 48 hours until the culture returns    Continue with the increase in the fluids        ASSESSMENT: UTI uncomplicated without evidence of pyelonephritis    PLAN: Treatment per orders -  also push fluids, may use Pyridium OTC prn. Call or return to clinic prn if these symptoms worsen or fail to improve as anticipated.

## 2019-07-18 ENCOUNTER — TELEPHONE (OUTPATIENT)
Dept: FAMILY MEDICINE | Facility: CLINIC | Age: 64
End: 2019-07-18

## 2019-07-18 LAB
BACTERIA SPEC CULT: ABNORMAL
BACTERIA SPEC CULT: ABNORMAL
SPECIMEN SOURCE: ABNORMAL

## 2019-07-18 NOTE — TELEPHONE ENCOUNTER
Reason for Call:  Request for results:    Name of test or procedure: urine culture    Date of test of procedure: 7/15/19    Location of the test or procedure: [facility]    OK to leave the result message on voice mail or with a family member? YES    Phone number Patient can be reached at:  Cell number on file:    Telephone Information:   Mobile [phone]       Additional comments: none    Call taken on 7/18/2019 at 1:22 PM by Dillan DYER

## 2019-07-18 NOTE — TELEPHONE ENCOUNTER
Left message UC came back positive and provider has already prescribed a medication for treatment, continue to monitor symptoms and call clinic is symptoms worsen or fail to improve once antibiotics are completed.    MESSI RodriguezN, RN  [facility]

## 2019-08-28 DIAGNOSIS — E78.5 HYPERLIPIDEMIA LDL GOAL <130: ICD-10-CM

## 2019-08-29 RX ORDER — SIMVASTATIN 40 MG
TABLET ORAL
Qty: 90 TABLET | Refills: 0 | Status: SHIPPED | OUTPATIENT
Start: 2019-08-29 | End: 2019-09-09

## 2019-08-29 NOTE — TELEPHONE ENCOUNTER
"Requested Prescriptions   Pending Prescriptions Disp Refills     simvastatin (ZOCOR) 40 MG tablet [Pharmacy Med Name: SIMVASTATIN  40MG  TAB] 90 tablet 0     Sig: TAKE 1 TABLET BY MOUTH  EVERY EVENING       Last Written Prescription Date:  6/26/2019  Last Fill Quantity: 90,  # refills: 0   Last office visit: 7/15/2019 with prescribing provider:     Future Office Visit:   Next 5 appointments (look out 90 days)    Sep 09, 2019  1:40 PM CDT  PHYSICAL with Gabriela Moore MD  [facility] ([facility]) [address] [place] [id number] [phone]               Statins Protocol Passed - 8/28/2019  8:26 PM        Passed - LDL on file in past 12 months     Recent Labs   Lab Test 09/11/18  0936   LDL 72             Passed - No abnormal creatine kinase in past 12 months     Recent Labs   Lab Test 08/09/18  1425                   Passed - Recent (12 mo) or future (30 days) visit within the authorizing provider's specialty     Patient had office visit in the last 12 months or has a visit in the next 30 days with authorizing provider or within the authorizing provider's specialty.  See \"Patient Info\" tab in inbasket, or \"Choose Columns\" in Meds & Orders section of the refill encounter.              Passed - Medication is active on med list        Passed - Patient is age 18 or older        Passed - No active pregnancy on record        Passed - No positive pregnancy test in past 12 months        "

## 2019-09-08 DIAGNOSIS — F33.41 DEPRESSION, MAJOR, RECURRENT, IN PARTIAL REMISSION (H): ICD-10-CM

## 2019-09-09 ENCOUNTER — OFFICE VISIT (OUTPATIENT)
Dept: FAMILY MEDICINE | Facility: CLINIC | Age: 64
End: 2019-09-09
Payer: COMMERCIAL

## 2019-09-09 VITALS
HEART RATE: 80 BPM | WEIGHT: 177 LBS | SYSTOLIC BLOOD PRESSURE: 122 MMHG | TEMPERATURE: 98.4 F | OXYGEN SATURATION: 95 % | BODY MASS INDEX: 27.78 KG/M2 | HEIGHT: 67 IN | DIASTOLIC BLOOD PRESSURE: 72 MMHG

## 2019-09-09 DIAGNOSIS — F33.41 DEPRESSION, MAJOR, RECURRENT, IN PARTIAL REMISSION (H): ICD-10-CM

## 2019-09-09 DIAGNOSIS — Z15.89 HETEROZYGOUS MTHFR MUTATION C677T: ICD-10-CM

## 2019-09-09 DIAGNOSIS — A60.00 GENITAL HERPES SIMPLEX, UNSPECIFIED SITE: ICD-10-CM

## 2019-09-09 DIAGNOSIS — D12.6 ADENOMATOUS POLYP OF COLON, UNSPECIFIED PART OF COLON: ICD-10-CM

## 2019-09-09 DIAGNOSIS — Z00.01 ENCOUNTER FOR ROUTINE ADULT MEDICAL EXAM WITH ABNORMAL FINDINGS: Primary | ICD-10-CM

## 2019-09-09 DIAGNOSIS — N39.41 URGE INCONTINENCE OF URINE: ICD-10-CM

## 2019-09-09 DIAGNOSIS — D22.9 MULTIPLE PIGMENTED NEVI: ICD-10-CM

## 2019-09-09 DIAGNOSIS — F99 INSOMNIA DUE TO OTHER MENTAL DISORDER: ICD-10-CM

## 2019-09-09 DIAGNOSIS — Z23 NEEDS FLU SHOT: ICD-10-CM

## 2019-09-09 DIAGNOSIS — N32.89 OTHER SPECIFIED DISORDERS OF BLADDER: ICD-10-CM

## 2019-09-09 DIAGNOSIS — Z11.4 ENCOUNTER FOR SCREENING FOR HIV: ICD-10-CM

## 2019-09-09 DIAGNOSIS — F51.05 INSOMNIA DUE TO OTHER MENTAL DISORDER: ICD-10-CM

## 2019-09-09 DIAGNOSIS — E78.5 HYPERLIPIDEMIA LDL GOAL <130: ICD-10-CM

## 2019-09-09 DIAGNOSIS — M20.42 HAMMER TOE OF SECOND TOE OF LEFT FOOT: ICD-10-CM

## 2019-09-09 LAB
BASOPHILS # BLD AUTO: 0 10E9/L (ref 0–0.2)
BASOPHILS NFR BLD AUTO: 0.5 %
DIFFERENTIAL METHOD BLD: NORMAL
EOSINOPHIL # BLD AUTO: 0.2 10E9/L (ref 0–0.7)
EOSINOPHIL NFR BLD AUTO: 3.5 %
ERYTHROCYTE [DISTWIDTH] IN BLOOD BY AUTOMATED COUNT: 12.5 % (ref 10–15)
HCT VFR BLD AUTO: 37.2 % (ref 35–47)
HGB BLD-MCNC: 12.7 G/DL (ref 11.7–15.7)
LYMPHOCYTES # BLD AUTO: 1.6 10E9/L (ref 0.8–5.3)
LYMPHOCYTES NFR BLD AUTO: 26.2 %
MCH RBC QN AUTO: 31.9 PG (ref 26.5–33)
MCHC RBC AUTO-ENTMCNC: 34.1 G/DL (ref 31.5–36.5)
MCV RBC AUTO: 94 FL (ref 78–100)
MONOCYTES # BLD AUTO: 0.4 10E9/L (ref 0–1.3)
MONOCYTES NFR BLD AUTO: 6.1 %
NEUTROPHILS # BLD AUTO: 4 10E9/L (ref 1.6–8.3)
NEUTROPHILS NFR BLD AUTO: 63.7 %
PLATELET # BLD AUTO: 187 10E9/L (ref 150–450)
RBC # BLD AUTO: 3.98 10E12/L (ref 3.8–5.2)
WBC # BLD AUTO: 6.3 10E9/L (ref 4–11)

## 2019-09-09 PROCEDURE — 85025 COMPLETE CBC W/AUTO DIFF WBC: CPT | Performed by: FAMILY MEDICINE

## 2019-09-09 PROCEDURE — 90471 IMMUNIZATION ADMIN: CPT | Performed by: FAMILY MEDICINE

## 2019-09-09 PROCEDURE — 80053 COMPREHEN METABOLIC PANEL: CPT | Performed by: FAMILY MEDICINE

## 2019-09-09 PROCEDURE — 99214 OFFICE O/P EST MOD 30 MIN: CPT | Mod: 25 | Performed by: FAMILY MEDICINE

## 2019-09-09 PROCEDURE — 82043 UR ALBUMIN QUANTITATIVE: CPT | Performed by: FAMILY MEDICINE

## 2019-09-09 PROCEDURE — 90686 IIV4 VACC NO PRSV 0.5 ML IM: CPT | Performed by: FAMILY MEDICINE

## 2019-09-09 PROCEDURE — 80061 LIPID PANEL: CPT | Performed by: FAMILY MEDICINE

## 2019-09-09 PROCEDURE — 87389 HIV-1 AG W/HIV-1&-2 AB AG IA: CPT | Performed by: FAMILY MEDICINE

## 2019-09-09 PROCEDURE — 99396 PREV VISIT EST AGE 40-64: CPT | Mod: 25 | Performed by: FAMILY MEDICINE

## 2019-09-09 PROCEDURE — 84443 ASSAY THYROID STIM HORMONE: CPT | Performed by: FAMILY MEDICINE

## 2019-09-09 PROCEDURE — 36415 COLL VENOUS BLD VENIPUNCTURE: CPT | Performed by: FAMILY MEDICINE

## 2019-09-09 RX ORDER — SIMVASTATIN 40 MG
TABLET ORAL
Qty: 90 TABLET | Refills: 1 | Status: SHIPPED | OUTPATIENT
Start: 2019-09-09 | End: 2020-02-19

## 2019-09-09 RX ORDER — FOLIC ACID 1 MG/1
1 TABLET ORAL DAILY
COMMUNITY
Start: 2019-09-09

## 2019-09-09 RX ORDER — TRAZODONE HYDROCHLORIDE 100 MG/1
100 TABLET ORAL
Qty: 90 TABLET | Refills: 3 | Status: SHIPPED | OUTPATIENT
Start: 2019-09-09 | End: 2019-12-03

## 2019-09-09 RX ORDER — VALACYCLOVIR HYDROCHLORIDE 500 MG/1
500 TABLET, FILM COATED ORAL 2 TIMES DAILY
Qty: 6 TABLET | Refills: 1 | Status: SHIPPED | OUTPATIENT
Start: 2019-09-09 | End: 2020-09-09

## 2019-09-09 RX ORDER — CITALOPRAM HYDROBROMIDE 20 MG/1
20 TABLET ORAL DAILY
Qty: 90 TABLET | Refills: 1 | Status: SHIPPED | OUTPATIENT
Start: 2019-09-09 | End: 2020-02-27

## 2019-09-09 ASSESSMENT — ANXIETY QUESTIONNAIRES
IF YOU CHECKED OFF ANY PROBLEMS ON THIS QUESTIONNAIRE, HOW DIFFICULT HAVE THESE PROBLEMS MADE IT FOR YOU TO DO YOUR WORK, TAKE CARE OF THINGS AT HOME, OR GET ALONG WITH OTHER PEOPLE: SOMEWHAT DIFFICULT
5. BEING SO RESTLESS THAT IT IS HARD TO SIT STILL: NOT AT ALL
3. WORRYING TOO MUCH ABOUT DIFFERENT THINGS: SEVERAL DAYS
2. NOT BEING ABLE TO STOP OR CONTROL WORRYING: SEVERAL DAYS
7. FEELING AFRAID AS IF SOMETHING AWFUL MIGHT HAPPEN: NOT AT ALL
6. BECOMING EASILY ANNOYED OR IRRITABLE: NOT AT ALL
1. FEELING NERVOUS, ANXIOUS, OR ON EDGE: SEVERAL DAYS
GAD7 TOTAL SCORE: 3

## 2019-09-09 ASSESSMENT — PATIENT HEALTH QUESTIONNAIRE - PHQ9
SUM OF ALL RESPONSES TO PHQ QUESTIONS 1-9: 8
5. POOR APPETITE OR OVEREATING: NOT AT ALL

## 2019-09-09 ASSESSMENT — MIFFLIN-ST. JEOR: SCORE: 1398.67

## 2019-09-09 NOTE — PROGRESS NOTES
SUBJECTIVE:   CC: Inessa Castaneda is an 64 year old woman who presents for preventive health visit.     Healthy Habits:    Do you get at least three servings of calcium containing foods daily (dairy, green leafy vegetables, etc.)? yes    Amount of exercise or daily activities, outside of work: 3-4 day(s) per week    Problems taking medications regularly No    Medication side effects: No    Have you had an eye exam in the past two years? no    Do you see a dentist twice per year? yes    Do you have sleep apnea, excessive snoring or daytime drowsiness? says she snores      Hyperlipidemia Follow-Up      Are you having any of the following symptoms? (Select all that apply)  No complaints of shortness of breath, chest pain or pressure.  No increased sweating or nausea with activity.  No left-sided neck or arm pain.  No complaints of pain in calves when walking 1-2 blocks.    Are you regularly taking any medication or supplement to lower your cholesterol?   Yes- rx    Are you having muscle aches or other side effects that you think could be caused by your cholesterol lowering medication?  No    Depression and Anxiety Follow-Up    How are you doing with your depression since your last visit? Stable    How are you doing with your anxiety since your last visit?  Stable    Are you having other symptoms that might be associated with depression or anxiety? Yes:  Still goes a number of days where it feel sdark or cloudy - questioning maybe couseling would help.     Have you had a significant life event? OTHER: sister in home     Do you have any concerns with your use of alcohol or other drugs? No    Her sister Margarita is diagnosed with schizophrenia and bipolar disorder. Her sister has been in psychiatry unit at [facility] a few times in past. Her sister is doing good - now at senior assisted living. She feels her sister is settling down and she is able to breathe now. It took a lot of work for her to get her sister to where  "she is now. She feels things are going okay. She gets support from her family. Would like to stay course of citalopram at current dose. Denies medication SE.     Social History     Tobacco Use     Smoking status: Never Smoker     Smokeless tobacco: Never Used   Substance Use Topics     Alcohol use: Yes     Alcohol/week: 0.0 oz     Comment: 2 times/month  - 1-2 glasses of wine      Drug use: No     Comment: no herbal meds either      PHQ 8/9/2018 9/10/2018 1/31/2019   PHQ-9 Total Score 10 7 9   Q9: Thoughts of better off dead/self-harm past 2 weeks Not at all Not at all Not at all     MIGEL-7 SCORE 8/9/2018 9/10/2018 1/31/2019   Total Score 2 4 4     Suicide Assessment Five-step Evaluation and Treatment (SAFE-T)    Medication Followup of Trazodone    Taking Medication as prescribed: yes - would like to try without taking it     Side Effects:  None    Medication Helping Symptoms:  yes    MTHFR mutation  Pt taking folic acid supplements - experienced numbness to lower extremities with prenatal vitamins. Also takes calcium, vitamin D, and fish oil supplements.      Hammer toe  Notes hammer toe of second digit on left foot. She had left bunionectomy performed by Dr. Crowe on 01/21/2008.     Change in height  Pt has lost 2 inches in height. She is currently 5'7 - per pt she used to be 5'9. Last DX was done 04/02/2018 which noted degenerative changes to lumbar spine, but otherwise normal bone density.     Urinary incontinence  S/p bladder swing 01/21/2008. She still has issues with incontinence - can be 8 ft away from toilet and urine will start \"dribbling out.\" Wonders what is causing this.     Family history of thyroid issues  Mother and sister have been on thyroid medication before.     Today's PHQ-2 Score:   PHQ-2 ( 1999 Pfizer) 9/10/2018 3/1/2018   Q1: Little interest or pleasure in doing things 1 0   Q2: Feeling down, depressed or hopeless 1 0   PHQ-2 Score 2 0       Abuse: Current or Past(Physical, Sexual or " Emotional)- No  Do you feel safe in your environment? Yes    Social History     Tobacco Use     Smoking status: Never Smoker     Smokeless tobacco: Never Used   Substance Use Topics     Alcohol use: Yes     Alcohol/week: 0.0 oz     Comment: 2 times/month  - 1-2 glasses of wine      If you drink alcohol do you typically have >3 drinks per day or >7 drinks per week? No                     Reviewed orders with patient.  Reviewed health maintenance and updated orders accordingly - Yes  Patient Active Problem List   Diagnosis     Depression, major, recurrent, in partial remission (H)     Hyperlipidemia LDL goal <130     Adenomatous colon polyp-at age 52, then normal colonoscopy at age 55 and then at age 60- needs repeat colonoscopy 1/2021      Urinary urgency     DVT (deep venous thrombosis) (H)     Genital herpes simplex, unspecified site     Cystocele     Family history of thyroid disorder- mother and sister     Non morbid obesity due to excess calories     Asymptomatic postmenopausal status     Urge incontinence of urine     Family history of osteoporosis- mother with signif. 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     benign     BUNIONECTOMY Left 1/21/2008    Dr. Alberto Laws - left foot medial and lateral with left 2nd toe surgery      SLING BLADDER SUSPENSION WITH FASCIA SULAIMAN  1/21/2008    - Dr. Emanuel Paz - had DVT afterward      XR ANKLE SURGERY NIXON LEFT  1/21/2008    Dr. Alberto Laws -arthroscopy ankle s/p fall - not fused        Social History     Tobacco Use     Smoking status: Never Smoker     Smokeless tobacco: Never Used   Substance Use Topics     Alcohol use: Yes      Alcohol/week: 0.0 oz     Comment: 2 times/month  - 1-2 glasses of wine      Family History   Problem Relation Age of Onset     Cerebrovascular Disease Mother 88        tiny strokes - TIA -  at age 90     Hypertension Mother      Alzheimer Disease Father 81         age 89     Heart Disease Father 63        s/p MI - first at age 63 , then s/p 4 vessel CABG     Mental Illness Sister         bipolar disorder -with heroin addiction - on methadone - no contact for years      Dementia Sister      Mental Illness Sister         bipolar - 2 with hoarding tendencies, psych haluciantions,       Mental Illness Brother          after fall while drunk - alcoholic      Vascular Disease Brother         dissecting thoracic aortic aneurysm      Hepatitis Brother         Hep C - treated     Mental Illness Sister         hoarding tendencies     Substance Abuse Sister         Opiates      Attention Deficit Disorder Daughter      Substance Abuse Daughter         adderall that was not prescribed         Current Outpatient Medications   Medication Sig Dispense Refill     calcium carbonate-vitamin D (CALCIUM 600+D) 600-200 MG-UNIT TABS Take 1 tablet by mouth       citalopram (CELEXA) 20 MG tablet TAKE 1 TABLET BY MOUTH  DAILY 90 tablet 0     omega 3 1000 MG CAPS Take 2 g by mouth daily 180 capsule 1     simvastatin (ZOCOR) 40 MG tablet TAKE 1 TABLET BY MOUTH  EVERY EVENING 90 tablet 0     traZODone (DESYREL) 100 MG tablet Take 1 tablet (100 mg) by mouth nightly as needed for sleep 90 tablet 3     valACYclovir (VALTREX) 500 MG tablet Take 1 tablet (500 mg) by mouth 2 times daily 6 tablet 1     Allergies   Allergen Reactions     Sulfa Drugs      rash       Mammogram Screening: Patient over age 50, mutual decision to screen reflected in health maintenance.    Pertinent mammograms are reviewed under the imaging tab.  History of abnormal Pap smear: NO - age 30- 65 PAP every 3 years recommended  PAP / HPV Latest Ref Rng & Units  3/1/2018 11/17/2015   PAP - NIL NIL   HPV 16 DNA NEG:Negative Negative -   HPV 18 DNA NEG:Negative Negative -   OTHER HR HPV NEG:Negative Negative -     Patient Active Problem List   Diagnosis     Depression, major, recurrent, in partial remission (H)     Hyperlipidemia LDL goal <130     Adenomatous colon polyp-at age 52, then normal colonoscopy at age 55 and then at age 60- needs repeat colonoscopy 1/2021      Urinary urgency     DVT (deep venous thrombosis) (H)     Genital herpes simplex, unspecified site     Cystocele     Family history of thyroid disorder- mother and sister     Non morbid obesity due to excess calories     Asymptomatic postmenopausal status     Urge incontinence of urine     Family history of osteoporosis- mother with signif. disease      Heterozygous MTHFR mutation C677T (H)-on Genesight testing - had numbness B distal LE with prenatal vit     Insomnia due to other mental disorder- mild depression/anxiety      Numbness in both legs- went away after coming off prenatal vitamin for MTHFR mutation        Current Outpatient Medications   Medication Sig Dispense Refill     calcium carbonate-vitamin D (CALCIUM 600+D) 600-200 MG-UNIT TABS Take 1 tablet by mouth       citalopram (CELEXA) 20 MG tablet TAKE 1 TABLET BY MOUTH  DAILY 90 tablet 0     omega 3 1000 MG CAPS Take 2 g by mouth daily 180 capsule 1     simvastatin (ZOCOR) 40 MG tablet TAKE 1 TABLET BY MOUTH  EVERY EVENING 90 tablet 0     traZODone (DESYREL) 100 MG tablet Take 1 tablet (100 mg) by mouth nightly as needed for sleep 90 tablet 3     valACYclovir (VALTREX) 500 MG tablet Take 1 tablet (500 mg) by mouth 2 times daily 6 tablet 1     Allergies   Allergen Reactions     Sulfa Drugs      rash     Reviewed and updated as needed this visit by clinical staff  Tobacco  Allergies  Meds  Med Hx  Surg Hx  Fam Hx  Soc Hx        Reviewed and updated as needed this visit by Provider        ROS:  CONSTITUTIONAL: POSITIVE for change in height,  "NEGATIVE for fever, chills, change in weight  INTEGUMENTARY/SKIN: NEGATIVE for worrisome rashes, moles or lesions  EYES: NEGATIVE for vision changes or irritation  ENT: NEGATIVE for ear, mouth and throat problems  RESP: NEGATIVE for significant cough or SOB  BREAST: NEGATIVE for masses, tenderness or discharge  CV: NEGATIVE for chest pain, palpitations or peripheral edema  GI: NEGATIVE for nausea, abdominal pain, heartburn, or change in bowel habits  : POSITIVE for urinary incontinence, NEGATIVE for unusual vaginal symptoms. No vaginal bleeding.  MUSCULOSKELETAL: POSITIVE for hammer toe, NEGATIVE for significant arthralgias or myalgia  NEURO: NEGATIVE for weakness, dizziness or paresthesias  PSYCHIATRIC: NEGATIVE for changes in mood or affect     This document serves as a record of the services and decisions personally performed and made by Gabriela Moore MD. It was created on her behalf by Lisa Porter, a trained medical scribe. The creation of this document is based on the provider's statements to the medical scribe.  Lisa Porter 2:18 PM September 9, 2019    OBJECTIVE:   /72 (BP Location: Left arm, Patient Position: Chair, Cuff Size: Adult Large)   Pulse 80   Temp 98.4  F (36.9  C) (Oral)   Ht 1.707 m (5' 7.2\")   Wt 80.3 kg (177 lb)   SpO2 95%   Breastfeeding? No   BMI 27.56 kg/m    EXAM:  GENERAL APPEARANCE: healthy, alert and no distress  EYES: Eyes grossly normal to inspection, PERRL and conjunctivae and sclerae normal  HENT: ear canals and TM's normal, nose and mouth without ulcers or lesions, oropharynx clear and oral mucous membranes moist  NECK: no adenopathy, no asymmetry, masses, or scars and thyroid normal to palpation  RESP: lungs clear to auscultation - no rales, rhonchi or wheezes  BREAST: normal without masses, tenderness or nipple discharge and no palpable axillary masses or adenopathy  CV: regular rate and rhythm, normal S1 S2, no S3 or S4, no murmur, click or rub, no peripheral edema and " peripheral pulses strong  ABDOMEN: soft, nontender, no hepatosplenomegaly, no masses and bowel sounds normal   (female): normal female external genitalia, normal urethral meatus, vaginal mucosal atrophy noted, normal cervix, adnexae, and uterus without masses or abnormal discharge  MS: no musculoskeletal defects are noted and gait is age appropriate without ataxia  SKIN:  multiple pigmented nevi, especially on back, no suspicious rashes  NEURO: Normal strength and tone, sensory exam grossly normal, mentation intact and speech normal  PSYCH: mentation appears normal and affect normal/bright    Diagnostic Test Results:  Results for orders placed or performed in visit on 09/09/19 (from the past 24 hour(s))   CBC with platelets differential   Result Value Ref Range    WBC 6.3 4.0 - 11.0 10e9/L    RBC Count 3.98 3.8 - 5.2 10e12/L    Hemoglobin 12.7 11.7 - 15.7 g/dL    Hematocrit 37.2 35.0 - 47.0 %    MCV 94 78 - 100 fl    MCH 31.9 26.5 - 33.0 pg    MCHC 34.1 31.5 - 36.5 g/dL    RDW 12.5 10.0 - 15.0 %    Platelet Count 187 150 - 450 10e9/L    % Neutrophils 63.7 %    % Lymphocytes 26.2 %    % Monocytes 6.1 %    % Eosinophils 3.5 %    % Basophils 0.5 %    Absolute Neutrophil 4.0 1.6 - 8.3 10e9/L    Absolute Lymphocytes 1.6 0.8 - 5.3 10e9/L    Absolute Monocytes 0.4 0.0 - 1.3 10e9/L    Absolute Eosinophils 0.2 0.0 - 0.7 10e9/L    Absolute Basophils 0.0 0.0 - 0.2 10e9/L    Diff Method Automated Method      ASSESSMENT/PLAN:       ICD-10-CM    1. Encounter for routine adult medical exam with abnormal findings Z00.01 TSH with free T4 reflex   2. Depression, major, recurrent, in partial remission (H) F33.41 citalopram (CELEXA) 20 MG tablet     traZODone (DESYREL) 100 MG tablet     OFFICE/OUTPT VISIT,EST,LEVL IV   3. 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 Hyperlipidemia LDL goal <130- needs f/u today - awaiting lab  results.  E78.5 simvastatin (ZOCOR) 40 MG tablet     Albumin Random Urine Quantitative with Creat Ratio     CBC with platelets differential     Comprehensive metabolic panel     Lipid panel reflex to direct LDL Fasting     OFFICE/OUTPT VISIT,EST,LEVL IV   5. Genital herpes simplex, unspecified site A60.00 valACYclovir (VALTREX) 500 MG tablet     OFFICE/OUTPT VISIT,EST,LEVL IV   6. Encounter for screening for HIV Z11.4 HIV Screening   7. Needs flu shot Z23      ADMIN VACCINE, FIRST [[id number]]     HC FLU VAC PRESRV FREE QUAD SPLIT VIR 3+YRS IM  [[id number]]   8. Other specified disorders of bladder- urinary incontinence  N32.89 TOD PT, HAND, AND CHIROPRACTIC REFERRAL     UROLOGY ADULT REFERRAL   9. Heterozygous MTHFR mutation C677T (H)-on Genesight testing - had numbness B distal LE with prenatal vit- now on plain folic acid 1mg daily  E72.12 folic acid (FOLVITE) 1 MG tablet     OFFICE/OUTPT VISIT,EST,LEVL IV   10. Hammer toe of second toe of left foot M20.42 ORTHO  REFERRAL     OFFICE/OUTPT VISIT,EST,LEVL IV   11. Adenomatous polyp of colon, unspecified part of colon D12.6    12. Urge incontinence of urine N39.41 OFFICE/OUTPT VISIT,EST,LEVL IV   13. Multiple pigmented nevi D22.9 SKIN CARE REFERRAL     OFFICE/OUTPT VISIT,EST,LEVL IV     Can try taking half tablet trazodone for a few weeks to see how this is working.    Information about urinary incontinence and Kegel exercises provided today. Will have pt see PT for pelvic floor therapy as well. Consider seeing urology if symptoms not improving with PT. Referrals for urology and PT placed. For bladder urgency - Avoid /eliminate citrus juices, citric acid or vinegars ( acidic substances - even those in salad dressing), all caffeine, even decaf coffee, and teas; alcohol, and artificial sweeteners, chocolate, tomato products and carbonated beverages ( even sparkling segovia). 1 serving of any of the above can irritate bladder for 3 days. If you eliminate all the above  "and are still having problems, please contact our office.       Referral for orthopedics placed.     We reviewed her height charts and height has been staying stable for last 5 years. Recommended trying yoga - will help improve posture. Chair yoga and senior yoga are also options.     Referral for skin specialist placed for full body skin exam.     Strongly recommend help with weight loss with an organized program such as -  Medifast (now called  Tej - leon Chang- as of 8/1/2019) ,  Weight watchers, Nutrisystem, Ideal Protein - available through Dr. Burk's office at [facility] in [place];  Slim-Genics, Zayra Marie, or  Overeaters Anonymous - HOW program or other organized independent nutritionally balanced weight loss program.  We all need help with something and one of these programs may help you in your lifestyle change journey with weight loss.     Flu shot administered in office today  Pt agrees for HIV testing as recommended by CDC    See pt instructions. Please, call or return to clinic or go to the ER immediately if signs or symptoms worsen or fail to improve as anticipated.     Return in about 1 year (around 9/9/2020) for depression/anxiety follow up.    COUNSELING:   Reviewed preventive health counseling, as reflected in patient instructions       Regular exercise       Healthy diet/nutrition       Immunizations    Vaccinated for: Influenza      Estimated body mass index is 27.56 kg/m  as calculated from the following:    Height as of this encounter: 1.707 m (5' 7.2\").    Weight as of this encounter: 80.3 kg (177 lb).    Weight management plan: Discussed healthy diet and exercise guidelines     reports that she has never smoked. She has never used smokeless tobacco.      Counseling Resources:  ATP IV Guidelines  Pooled Cohorts Equation Calculator  Breast Cancer Risk Calculator  FRAX Risk Assessment  ICSI Preventive Guidelines  Dietary Guidelines for Americans, 2010  USDA's " MyPlate  ASA Prophylaxis  Lung CA Screening    The information in this document, created by the medical scribe for me, accurately reflects the services I personally performed and the decisions made by me. I have reviewed and approved this document for accuracy prior to leaving the patient care area.  September 9, 2019 3:00 PM    Gabriela Moore MD  [facility] [place]

## 2019-09-09 NOTE — LETTER
[facility]  [address] [place] [address], [place] [address]                  [phone]   October 4, 2019    Inessa Castaneda  [address]  [facility] [address]      Dear Inessa,    Here is a summary of your recent test results:    Normal red blood cell (hgb) levels, normal white blood cell count and normal platelet levels.   -Cholesterol levels (LDL,HDL, Triglycerides) are normal.  ADVISE: rechecking in 1 year.   -Liver and gallbladder tests are normal (ALT,AST, Alk phos, bilirubin), kidney function is normal (Cr, GFR), sodium is normal, potassium is normal, calcium is normal, glucose is normal.   -TSH (thyroid stimulating hormone) level is normal which indicates normal thyroid function.   -HIV test is normal.   -Microalbumin (urine protein) test is normal.  ADVISE: rechecking this annually.     Your test results are enclosed.      Please contact me if you have any questions.    In addition, here is a list of due or overdue Health Maintenance reminders.    Health Maintenance Due   Topic Date Due     Zoster (Shingles) Vaccine (1 of 2) 02/17/2007       Please call us at [phone] (or use [phone]) to address the above recommendations.            Thank you very much for trusting [facility]..     Healthy regards,       Gabriela Moore M.D.          Results for orders placed or performed in visit on 09/09/19   HIV Screening   Result Value Ref Range    HIV Antigen Antibody Combo Nonreactive NR^Nonreactive       Albumin Random Urine Quantitative with Creat Ratio   Result Value Ref Range    Creatinine Urine 10 mg/dL    Albumin Urine mg/L <5 mg/L    Albumin Urine mg/g Cr Unable to calculate due to low value 0 - 25 mg/g Cr   CBC with platelets differential   Result Value Ref Range    WBC 6.3 4.0 - 11.0 10e9/L    RBC Count 3.98 3.8 - 5.2 10e12/L    Hemoglobin 12.7 11.7 - 15.7 g/dL    Hematocrit 37.2 35.0 - 47.0 %    MCV 94 78 - 100 fl    MCH 31.9 26.5 - 33.0  pg    MCHC 34.1 31.5 - 36.5 g/dL    RDW 12.5 10.0 - 15.0 %    Platelet Count 187 150 - 450 10e9/L    % Neutrophils 63.7 %    % Lymphocytes 26.2 %    % Monocytes 6.1 %    % Eosinophils 3.5 %    % Basophils 0.5 %    Absolute Neutrophil 4.0 1.6 - 8.3 10e9/L    Absolute Lymphocytes 1.6 0.8 - 5.3 10e9/L    Absolute Monocytes 0.4 0.0 - 1.3 10e9/L    Absolute Eosinophils 0.2 0.0 - 0.7 10e9/L    Absolute Basophils 0.0 0.0 - 0.2 10e9/L    Diff Method Automated Method    Comprehensive metabolic panel   Result Value Ref Range    Sodium 137 133 - 144 mmol/L    Potassium 3.9 3.4 - 5.3 mmol/L    Chloride 103 94 - 109 mmol/L    Carbon Dioxide 27 20 - 32 mmol/L    Anion Gap 7 3 - 14 mmol/L    Glucose 73 70 - 99 mg/dL    Urea Nitrogen 16 7 - 30 mg/dL    Creatinine 0.83 0.52 - 1.04 mg/dL    GFR Estimate 76 >60 mL/min/[1.73_m2]    GFR Estimate If Black 88 >60 mL/min/[1.73_m2]    Calcium 9.3 8.5 - 10.1 mg/dL    Bilirubin Total 1.0 0.2 - 1.3 mg/dL    Albumin 4.1 3.4 - 5.0 g/dL    Protein Total 7.4 6.8 - 8.8 g/dL    Alkaline Phosphatase 58 40 - 150 U/L    ALT 19 0 - 50 U/L    AST 17 0 - 45 U/L   Lipid panel reflex to direct LDL Fasting   Result Value Ref Range    Cholesterol 178 <200 mg/dL    Triglycerides 82 <150 mg/dL    HDL Cholesterol 67 >49 mg/dL    LDL Cholesterol Calculated 95 <100 mg/dL    Non HDL Cholesterol 111 <130 mg/dL   TSH with free T4 reflex   Result Value Ref Range    TSH 1.42 0.40 - 4.00 mU/L

## 2019-09-09 NOTE — PATIENT INSTRUCTIONS
Strongly recommend help with weight loss with an organized program such as -  Medifast (now called  Tej - leon Chang- as of 8/1/2019) ,  Weight watchers, Nutrisystem, Ideal Protein - available through Dr. Burk's office at [facility];  Slim-Genics, Zayra Marie, or  Overeaters Anonymous - HOW program or other organized independent nutritionally balanced weight loss program.  We all need help with something and one of these programs may help you in your lifestyle change journey with weight loss.     For bladder urgency - Avoid /eliminate citrus juices, citric acid or vinegars ( acidic substances - even those in salad dressing), all caffeine, even decaf coffee, and teas; alcohol, and artificial sweeteners, chocolate, tomato products and carbonated beverages ( even sparkling segovia).   1 serving of any of the above can irritate bladder for 3 days.     If you eliminate all the above and are still having problems, please contact our office.               Preventive Health Recommendations  Female Ages 50 - 64    Yearly exam: See your health care provider every year in order to  o Review health changes.   o Discuss preventive care.    o Review your medicines if your doctor has prescribed any.      Get a Pap test every three years (unless you have an abnormal result and your provider advises testing more often).    If you get Pap tests with HPV test, you only need to test every 5 years, unless you have an abnormal result.     You do not need a Pap test if your uterus was removed (hysterectomy) and you have not had cancer.    You should be tested each year for STDs (sexually transmitted diseases) if you're at risk.     Have a mammogram every 1 to 2 years.    Have a colonoscopy at age 50, or have a yearly FIT test (stool test). These exams screen for colon cancer.      Have a cholesterol test every 5 years, or more often if advised.    Have a diabetes test (fasting glucose) every three  years. If you are at risk for diabetes, you should have this test more often.     If you are at risk for osteoporosis (brittle bone disease), think about having a bone density scan (DEXA).    Shots: Get a flu shot each year. Get a tetanus shot every 10 years.    Nutrition:     Eat at least 5 servings of fruits and vegetables each day.    Eat whole-grain bread, whole-wheat pasta and brown rice instead of white grains and rice.    Get adequate Calcium and Vitamin D.     Lifestyle    Exercise at least 150 minutes a week (30 minutes a day, 5 days a week). This will help you control your weight and prevent disease.    Limit alcohol to one drink per day.    No smoking.     Wear sunscreen to prevent skin cancer.     See your dentist every six months for an exam and cleaning.    See your eye doctor every 1 to 2 years.               Thank you for choosing [facility]  for your Health Care. It was a pleasure seeing you at your visit today. Please contact us with any questions or concerns you may have.                   Gabriela Moore MD                                  To reach your [facility] care team after hours call:   [phone]    Our clinic hours are:     Monday- 7:30 am - 7:00 pm                             Tuesday through Friday- 7:30 am - 5:00 pm                                        Saturday- 8:00 am - 12:00 pm                  Phone:  [phone]    Our pharmacy hours are:     Monday  8:00 am to 7:00 pm      Tuesday through Friday 8:00am to 6:00pm                        Saturday - 9:00 am to 1:00 pm      Sunday : Closed.              Phone:  [phone]      There is also information available at our web site:  [email or web address]    If your provider ordered any lab tests and you do not receive the results within 10 business days, please call the clinic.    If you need a medication refill please contact your pharmacy.  Please allow 2 business days for your refill to be  "completed.    Our clinic offers telephone visits and e visits.  Please ask one of your team members to explain more.      Use MyChart (secure email communication and access to your chart) to send your primary care provider a message or make an appointment. Ask someone on your Team how to sign up for [employer].                 URINARY INCONTINENCE [female]  Urinary Incontinence means loss of control of the bladder. This may be due to infection, medicine, aging, poor pelvic muscle tone, bladder spasms, obesity or urinary retention.    STRESS INCONTINENCE is a special form of incontinence in women where the muscles and ligaments supporting the bladder have been stretched by pregnancy. Coughing, sneezing or laughing increases bladder pressure and may cause loss of urine.  URGE INCONTINENCE (also called \"overactive bladder\") is a sudden urge to urinate even though there may not be much urine in the bladder. The need to urinate often during the night is common. It is due to bladder spasms. It is a common cause of incontinence in both men and women.  Treatment of this condition depends on the cause. Infections of the bladder are treated with antibiotics. Urinary retention is treated with a bladder catheter. Stress incontinence can be treated with special exercises to strengthen muscles around the bladder, although sometimes surgery is required.  HOME CARE:  1) Avoid foods and drinks that may irritate the bladder (alcohol, caffeine, carbonated drinks, chocolate, citrus fruits and acidic fruits and juices).  2) Limit fluid intake to 6 to 8 cups a day.  3) Lose weight if you are overweight. This will reduce your symptoms.  4) If your problem is stress incontinence, talk to your doctor about Kegel exercises to strengthen the muscles around the bladder.  5) If necessary, wear absorbent pads to catch urine.  6) Bathe daily to maintain good hygiene and prevent odors and skin irritation from contact with urine.  7) If an antibiotic " was prescribed to treat a bladder infection, be sure to take it until finished, even if you are feeling better before then.  8) If a bladder catheter was left in place, it is important to keep the bacteria from getting into the collection bag. Use a leg band to secure the drainage tube, so it does not pull on the catheter. Drain the collection bag when it becomes full using the drain spout at the bottom of the bag. Do not disconnect the bag from the catheter.  FOLLOW UP with your doctor or this facility as advised. If a catheter was left in place, it will need to be removed or changed soon.  GET PROMPT MEDICAL ATTENTION if any of the following occur:  -- Fever of 99.5 F (orally)  -- Bladder pain or fullness  -- Abdominal swelling, nausea or vomiting or back pain  -- Catheter falls out or stops draining (no urine from catheter in six hours)  -- Weakness, dizziness or fainting    8362-6580 [place] [address]. All rights reserved. This information is not intended as a substitute for professional medical care. Always follow your healthcare professional's instructions.      Patient Education     Treating Incontinence in Women: Nonsurgical Methods    The best treatment for you will depend on the type of incontinence you have. Your symptoms, age, and any underlying problems that are found also affect your treatment. While some types of incontinence may eventually require surgery, nonsurgical treatments may be effective in many cases. Nonsurgical treatments include lifestyle changes, muscle-strengthening exercises, and medicines.  Nonsurgical Treatments  Treatment for stress urinary incontinence includes:    Bladder training    Lifestyle changes such as weight loss and increased activity if incontinence is due to being overweight    Medicines, if bladder training has not helped    Pelvic floor muscle exercises  Lifestyle changes    Losing weight. Excess weight puts extra pressure on  the pelvic floor muscles. Exercising and eating right can help you lose weight. This helps other treatments work better.    Making certain diet changes. Some foods may make you need to urinate more, so it may be good to avoid them. These include caffeinated drinks and alcohol. Ask your healthcare provider whether these or other diet changes might be helpful.    Quitting smoking. Smoking can lead to a chronic cough that strains pelvic floor muscles. Smoking may also damage the bladder and urethra.  Pelvic floor muscle exercises  There are exercises you can do to help strengthen your pelvic floor muscles. The pelvic floor muscles act as a sling to help hold the bladder and urethra in place. These muscles also help keep the urethra closed. Weak pelvic floor muscles may allow urine to leak. To strengthen the pelvic floor muscles, do the exercises daily. In a few months, the muscles will be stronger and tighter. This can help prevent urine leakage.  Date Last Reviewed: 1/1/2017 2000-2018 [employer]. [address] [place] [address]. All rights reserved. This information is not intended as a substitute for professional medical care. Always follow your healthcare professional's instructions.           Patient Education     Kegel Exercises  Kegel exercises don t need special clothing or equipment. They re easy to learn and simple to do. And if you do them right, no one can tell you re doing them, so they can be done almost anywhere. Your healthcare provider, nurse, or physical therapist can answer any questions you have and help you get started.    A weak pelvic floor  The pelvic floor muscles may weaken due to aging, pregnancy and vaginal childbirth, injury, surgery, chronic cough, or lack of exercise. If the pelvic floor is weak, your bladder and other pelvic organs may sag out of place. The urethra may also open too easily and allow urine to leak out. Kegel exercises can help you strengthen  your pelvic floor muscles. Then they can better support the pelvic organs and control urine flow.  How Kegel exercises are done  Try each of the Kegel exercises described below. When you re doing them, try not to move your leg, buttock, or stomach muscles:    Contract as if you were stopping your urine stream. But do it when you re not urinating.    Tighten your rectum as if trying not to pass gas. Contract your anus, but don t move your buttocks.    You may place a finger or 2 in the vagina and squeeze your finger with your vagina to learn which muscles to tighten.  Try to hold each Kegel for a slow count to 5. You probably won t be able to hold them for that long at first. But keep practicing. It will get easier as your pelvic floor gets stronger. Eventually, special weights that you place in your vagina may be recommended to help make your Kegels even more effective. Visit your healthcare provider if you have difficulties doing Kegel exercises.  Helpful hints  Here are some tips to follow:    Do your Kegels as often as you can. The more you do them, the faster you ll feel the results.    Pick an activity you do often as a reminder. For instance, do your Kegels every time you sit down.    Tighten your pelvic floor before you sneeze, get up from a chair, cough, laugh, or lift. This protects your pelvic floor from injury and can help prevent urine leakage.   Date Last Reviewed: 10/1/2017    7763-0190 [employer]. [address] [place] [address]. All rights reserved. This information is not intended as a substitute for professional medical care. Always follow your healthcare professional's instructions.           Patient Education     Pelvic Organ Prolapse: Nonsurgical Treatment  If your pelvic organ prolapse is mild or doesn t bother you much, or if you have medical conditions that make surgery too risky, nonsurgical treatment may be a good choice. A device (pessary) to wear in your vagina  can help ease your symptoms. You may also be given certain exercises (Kegels) to do. And you may need to make some lifestyle changes.  Wearing a pessary  A pessary helps support the prolapsed organ or organs. It is specifically fitted by your healthcare provider. A pessary may ease your symptoms, but it can t repair prolapse. The pessary must be removed for cleaning. If you can t do this, you will need to see your healthcare provider regularly. He or she will remove and clean your pessary. If you have questions or concerns about the pessary, be sure to talk with your provider.  Doing Kegels  Kegels are simple exercises that you can do to strengthen the pelvic floor muscles. They may ease your symptoms and prevent further prolapse. To do a Kegel, contract your pelvic floor muscles as if to stop the urine stream. (Do this when you re not urinating.) Ask your healthcare provider how many Kegels to do and how long to hold each one. During your treatment visits, your provider may place a device in your vagina to measure your Kegel contractions. That way, you can find out if you are doing Kegel exercises correctly.  Living a healthy life  Improving your health may ease your symptoms or keep your problem from worsening. You may be asked to:    Quit smoking to prevent excessive coughing    Adjust medicines that may cause urine leakage     Limit fluid intake, if incontinence is a problem. This includes limiting drinks that contain caffeine (a diuretic). Bladder training (emptying your bladder at scheduled times) also may be useful if you have incontinence.    Not do any lifting, which puts pressure on pelvic muscles    Exercise and eat well to stay at a healthy weight   Date Last Reviewed: 6/1/2017 2000-2018 [employer]. [address] [place] [address], [place] [address]. All rights reserved. This information is not intended as a substitute for professional medical care. Always follow your healthcare professional's  instructions.                    Thank you for choosing [facility]  for your Health Care. It was a pleasure seeing you at your visit today. Please contact us with any questions or concerns you may have.                   Gabriela Moore MD                                  To reach your [facility] care team after hours call:   [phone]    Our clinic hours are:     Monday- 7:30 am - 7:00 pm                             Tuesday through Friday- 7:30 am - 5:00 pm                                        Saturday- 8:00 am - 12:00 pm                  Phone:  [phone]    Our pharmacy hours are:     Monday  8:00 am to 7:00 pm      Tuesday through Friday 8:00am to 6:00pm                        Saturday - 9:00 am to 1:00 pm      Sunday : Closed.              Phone:  [phone]      There is also information available at our web site:  [email or web address]    If your provider ordered any lab tests and you do not receive the results within 10 business days, please call the clinic.    If you need a medication refill please contact your pharmacy.  Please allow 2 business days for your refill to be completed.    Our clinic offers telephone visits and e visits.  Please ask one of your team members to explain more.      Use [email or web address] (secure email communication and access to your chart) to send your primary care provider a message or make an appointment. Ask someone on your Team how to sign up for [employer].

## 2019-09-09 NOTE — TELEPHONE ENCOUNTER
"Requested Prescriptions   Pending Prescriptions Disp Refills     citalopram (CELEXA) 20 MG tablet [Pharmacy Med Name: CITALOPRAM  20MG  TAB] 90 tablet 0     Sig: TAKE 1 TABLET BY MOUTH  DAILY       Last Written Prescription Date:  6/27/2019  Last Fill Quantity: 90,  # refills: 0   Last office visit: 7/15/2019 with prescribing provider:     Future Office Visit:   Next 5 appointments (look out 90 days)    Sep 09, 2019  1:40 PM CDT  PHYS[facility] [address] [place] [id number]  [phone]               SSRIs Protocol Failed - 9/8/2019  8:17 PM        Failed - PHQ-9 score less than 5 in past 6 months     Please review last PHQ-9 score.           Passed - Medication is active on med list        Passed - Patient is age 18 or older        Passed - No active pregnancy on record        Passed - No positive pregnancy test in last 12 months        Passed - Recent (6 mo) or future (30 days) visit within the authorizing provider's specialty     Patient had office visit in the last 6 months or has a visit in the next 30 days with authorizing provider or within the authorizing provider's specialty.  See \"Patient Info\" tab in inbasket, or \"Choose Columns\" in Meds & Orders section of the refill encounter.            "

## 2019-09-10 ENCOUNTER — TELEPHONE (OUTPATIENT)
Dept: FAMILY MEDICINE | Facility: CLINIC | Age: 64
End: 2019-09-10

## 2019-09-10 LAB
ALBUMIN SERPL-MCNC: 4.1 G/DL (ref 3.4–5)
ALP SERPL-CCNC: 58 U/L (ref 40–150)
ALT SERPL W P-5'-P-CCNC: 19 U/L (ref 0–50)
ANION GAP SERPL CALCULATED.3IONS-SCNC: 7 MMOL/L (ref 3–14)
AST SERPL W P-5'-P-CCNC: 17 U/L (ref 0–45)
BILIRUB SERPL-MCNC: 1 MG/DL (ref 0.2–1.3)
BUN SERPL-MCNC: 16 MG/DL (ref 7–30)
CALCIUM SERPL-MCNC: 9.3 MG/DL (ref 8.5–10.1)
CHLORIDE SERPL-SCNC: 103 MMOL/L (ref 94–109)
CHOLEST SERPL-MCNC: 178 MG/DL
CO2 SERPL-SCNC: 27 MMOL/L (ref 20–32)
CREAT SERPL-MCNC: 0.83 MG/DL (ref 0.52–1.04)
GFR SERPL CREATININE-BSD FRML MDRD: 76 ML/MIN/{1.73_M2}
GLUCOSE SERPL-MCNC: 73 MG/DL (ref 70–99)
HDLC SERPL-MCNC: 67 MG/DL
HIV 1+2 AB+HIV1 P24 AG SERPL QL IA: NONREACTIVE
LDLC SERPL CALC-MCNC: 95 MG/DL
NONHDLC SERPL-MCNC: 111 MG/DL
POTASSIUM SERPL-SCNC: 3.9 MMOL/L (ref 3.4–5.3)
PROT SERPL-MCNC: 7.4 G/DL (ref 6.8–8.8)
SODIUM SERPL-SCNC: 137 MMOL/L (ref 133–144)
TRIGL SERPL-MCNC: 82 MG/DL
TSH SERPL DL<=0.005 MIU/L-ACNC: 1.42 MU/L (ref 0.4–4)

## 2019-09-10 RX ORDER — CITALOPRAM HYDROBROMIDE 20 MG/1
TABLET ORAL
Qty: 90 TABLET | Refills: 0 | OUTPATIENT
Start: 2019-09-10

## 2019-09-10 ASSESSMENT — ANXIETY QUESTIONNAIRES: GAD7 TOTAL SCORE: 3

## 2019-09-10 NOTE — TELEPHONE ENCOUNTER
Reason for Call:  Form, our goal is to have forms completed with 72 hours, however, some forms may require a visit or additional information.    Type of letter, form or note:  Biometric screening    Who is the form from?: Insurance comp - [facility]    Where did the form come from: Patient or family brought in       What clinic location was the form placed at?: [facility]    Where the form was placed: MIN'jevon ROJAS's desk - South side - been signed - needs rest of lab results added    What number is listed as a contact on the form?: no number       Additional comments: Fax to [phone] when completed and sent to scan. Pt renee snot need communication about this.    Call taken on 9/10/2019 at 9:20 AM by Paulo Belle CMA

## 2019-09-11 LAB
CREAT UR-MCNC: 10 MG/DL
MICROALBUMIN UR-MCNC: <5 MG/L
MICROALBUMIN/CREAT UR: NORMAL MG/G CR (ref 0–25)

## 2019-09-11 NOTE — TELEPHONE ENCOUNTER
Completed forms faxed back to [employer] at [phone].   Originals sent to be scanned.     Cass Dumont

## 2019-10-31 ENCOUNTER — NURSE TRIAGE (OUTPATIENT)
Dept: FAMILY MEDICINE | Facility: CLINIC | Age: 64
End: 2019-10-31

## 2019-10-31 DIAGNOSIS — R39.15 URINARY URGENCY: Primary | ICD-10-CM

## 2019-10-31 RX ORDER — NITROFURANTOIN 25; 75 MG/1; MG/1
100 CAPSULE ORAL 2 TIMES DAILY
Qty: 14 CAPSULE | Refills: 0 | Status: SHIPPED | OUTPATIENT
Start: 2019-10-31 | End: 2019-12-03

## 2019-10-31 NOTE — TELEPHONE ENCOUNTER
"Patient stated she has been having the urge to void for the past 2 days.  Patient is going out of town in the morning and wanted to have antibiotics without having a UA completed.  Attempted to educate patient provider needs to know if patient does have a UTI and to treat appropropriately.  Patient works until 430 pm and cannot come in to drop off a UA.  Advised patient provider may recommend UC if unable to come in for a UA.  Will huddle with provider.  Pharmacy is pended.    Answer Assessment - Initial Assessment Questions  1. SYMPTOM: \"What's the main symptom you're concerned about?\" (e.g., frequency, incontinence)      Patient states she feels like she has to go to the bathroom but does not have to go very much.  2. ONSET: \"When did the  UTI  start?\"       2 days ago  3. PAIN: \"Is there any pain?\" If so, ask: \"How bad is it?\" (Scale: 1-10; mild, moderate, severe)      0/10  4. CAUSE: \"What do you think is causing the symptoms?\"      Possible UTI  5. OTHER SYMPTOMS: \"Do you have any other symptoms?\" (e.g., fever, flank pain, blood in urine, pain with urination)      Some pain with urination, uncomforable  6. PREGNANCY: \"Is there any chance you are pregnant?\" \"When was your last menstrual period?\"      N/a    Protocols used: URINARY SYMPTOMS-[facility]    Patient can be reached at: [phone], ok to leave a detailed message.    "

## 2019-10-31 NOTE — TELEPHONE ENCOUNTER
Huddled with provider who recommends UA/UC be checked and advised patient go to outpatient lab at [facility] or [facility].  Patient spoke with manager and will attempt to leave work early.  A lab only appointment was scheduled for 4 pm today.  Advised patient a abx will be sent in to pharmacy but not to start abx until UA has been completed.  Patient stated understanding and was agreeable with plan.    MESSI RodriguezN, RN  [employer] [facility]

## 2019-10-31 NOTE — TELEPHONE ENCOUNTER
Reason for call:  Patient reporting a symptom    Symptom or request: possible uti    Duration (how long have symptoms been present): 2 days    Have you been treated for this before?     Additional comments: Pt is hoping she does not need to come in for an appt    Phone Number patient can be reached at:  Home number on file [phone] (home)    Best Time:      Can we leave a detailed message on this number:  YES    Call taken on 10/31/2019 at 12:11 PM by Amber Espana

## 2019-11-18 ENCOUNTER — TELEPHONE (OUTPATIENT)
Dept: FAMILY MEDICINE | Facility: CLINIC | Age: 64
End: 2019-11-18

## 2019-11-18 NOTE — TELEPHONE ENCOUNTER
Reason for Call:  Other prescription    Detailed comments: Patient calling, states that she did genesight testing last year and was put on trazodone after that. Patient stopped taking it a month ago but has some questions about the medication and her depression.    Phone Number Patient can be reached at: Cell number on file:    Telephone Information:   Mobile [phone]       Best Time: anytime    Can we leave a detailed message on this number? YES    Call taken on 11/18/2019 at 11:43 AM by Dillan DYER

## 2019-11-18 NOTE — TELEPHONE ENCOUNTER
Attempt #1  Called patient @ # below - Left a non-detailed message to call back and speak with any triage nurse.    Arabella Washington RN  [facility]

## 2019-11-18 NOTE — TELEPHONE ENCOUNTER
Patient calling back. Had Genesight testing in 2018 and was started on trazodone.    After speaking with MD MIN at her last appointment she made the decision to stop the trazodone. After speaking with friends and looking online, she had the impression that this was more of a sleep aid versus antidepressant and did not feel she necessarily needed that type of medication. She has been off medication for about 6 weeks. Recently she has noticed that she is feeling kind of flat, blah - not really happy about things. Feels more aggitated/darker than usual. Feels her  is taking the brunt of her emotions.    She is wondering if she should restart trazodone or if she should try something else.    Patient can be reached at [phone] (detailed voicemail OK).    Routing to both JHON NGUYEN and MD MIN. Please advise. Thank you.  BRANDEN Barrios, RN  [facility]

## 2019-12-02 NOTE — PROGRESS NOTES
"  SUBJECTIVE:                                                    Inessa Castaneda is a 64 year old female who is being evaluated via a telephone visit.      The patient has been notified of following:     \"This telephone visit will be conducted via a call between you and your physician/provider. We have found that certain health care needs can be provided without the need for a physical exam.  This service lets us provide the care you need with a short phone conversation.  If a prescription is necessary we can send it directly to your pharmacy.  If lab work is needed we can place an order for that and you can then stop by our lab to have the test done at a later time.    We will bill your insurance company for this service.  Please check with your medical insurance if this type of visit is covered. You may be responsible for the cost of this type of visit if insurance coverage is denied.  The typical cost is $30 (10min), $59 (11-20min) and $85 (21-30min).  Most often these visits are shorter than 10 minutes.    If during the course of the call the physician/provider feels a telephone visit is not appropriate, you will not be charged for this service.\"       Consent has been obtained for this service by care team member: yes.   See the scanned image in the medical record.    Inessa Castaneda complains of  Recheck Medication      Depression Followup  Stopped trazodone ~October because she didn't feel that she needed an additional depression medication after doing some research on the Internet/talking with friends.  Has sister with a LOT of mental health issues.  Has seen a therapist at points in the past but not recently.   apprehensive to cost of therapy.  Still taking citalopram 20 mg daily.  Thinking restarting medication is a good idea.    How are you doing with your depression since your last visit? Worsened since going off of trazodone     Are you having other symptoms that might be associated with " depression? No    Have you had a significant life event?  No     Are you feeling anxious or having panic attacks?   No    Do you have any concerns with your use of alcohol or other drugs? No    Social History     Tobacco Use     Smoking status: Never Smoker     Smokeless tobacco: Never Used   Substance Use Topics     Alcohol use: Yes     Alcohol/week: 0.0 standard drinks     Comment: 2 times/month  - 1-2 glasses of wine      Drug use: No     Comment: no herbal meds either      PHQ 1/31/2019 9/9/2019 12/3/2019   PHQ-9 Total Score 9 8 19   Q9: Thoughts of better off dead/self-harm past 2 weeks Not at all Not at all Several days     MIGEL-7 SCORE 1/31/2019 9/9/2019 12/3/2019   Total Score 4 3 11           Suicide Assessment Five-step Evaluation and Treatment (SAFE-T)      How many servings of fruits and vegetables do you eat daily?  4 or more    On average, how many sweetened beverages do you drink each day (Examples: soda, juice, sweet tea, etc.  Do NOT count diet or artificially sweetened beverages)?   0    How many days per week do you miss taking your medication? 0      I have reviewed and updated the patient's Past Medical History, Social History, Family History and Medication List.    ALLERGIES  Sulfa drugs    Gabriela Roque MA    Assessment/Plan:  Inessa was seen today for recheck medication.    Diagnoses and all orders for this visit:    Depression, major, recurrent, in partial remission (H), Insomnia due to other mental disorder- mild depression/anxiety   Suboptimally controlled.  Restart trazodone, start psychotherapy.  Continue citalopram.  F/u phone visit in 6 weeks.  -     MENTAL HEALTH REFERRAL  - Adult; Outpatient Treatment; Individual/Couples/Family/Group Therapy/Health Psychology; G: [facility] [phone]; We will contact you to schedule the appointment or please call with any questions  -     traZODone (DESYREL) 100 MG tablet; Take 1 tablet (100 mg) by mouth nightly as needed  for sleep      I have reviewed the note as documented above.  This accurately captures the substance of my conversation with the patient, Inessa Castaneda      Total time of call between patient and provider was 10:11 minutes             Doris Cruz MS, PA-C    [facility]

## 2019-12-03 ENCOUNTER — VIRTUAL VISIT (OUTPATIENT)
Dept: FAMILY MEDICINE | Facility: CLINIC | Age: 64
End: 2019-12-03
Payer: COMMERCIAL

## 2019-12-03 DIAGNOSIS — F33.41 DEPRESSION, MAJOR, RECURRENT, IN PARTIAL REMISSION (H): Primary | ICD-10-CM

## 2019-12-03 DIAGNOSIS — F51.05 INSOMNIA DUE TO OTHER MENTAL DISORDER: ICD-10-CM

## 2019-12-03 DIAGNOSIS — F99 INSOMNIA DUE TO OTHER MENTAL DISORDER: ICD-10-CM

## 2019-12-03 PROCEDURE — 99441 ZZC PHYSICIAN TELEPHONE EVALUATION 5-10 MIN: CPT | Performed by: PHYSICIAN ASSISTANT

## 2019-12-03 RX ORDER — TRAZODONE HYDROCHLORIDE 100 MG/1
100 TABLET ORAL
Qty: 90 TABLET | Refills: 1 | Status: SHIPPED | OUTPATIENT
Start: 2019-12-03 | End: 2020-07-07

## 2019-12-03 ASSESSMENT — ANXIETY QUESTIONNAIRES
6. BECOMING EASILY ANNOYED OR IRRITABLE: NEARLY EVERY DAY
7. FEELING AFRAID AS IF SOMETHING AWFUL MIGHT HAPPEN: SEVERAL DAYS
1. FEELING NERVOUS, ANXIOUS, OR ON EDGE: SEVERAL DAYS
2. NOT BEING ABLE TO STOP OR CONTROL WORRYING: MORE THAN HALF THE DAYS
GAD7 TOTAL SCORE: 11
5. BEING SO RESTLESS THAT IT IS HARD TO SIT STILL: SEVERAL DAYS
IF YOU CHECKED OFF ANY PROBLEMS ON THIS QUESTIONNAIRE, HOW DIFFICULT HAVE THESE PROBLEMS MADE IT FOR YOU TO DO YOUR WORK, TAKE CARE OF THINGS AT HOME, OR GET ALONG WITH OTHER PEOPLE: SOMEWHAT DIFFICULT
3. WORRYING TOO MUCH ABOUT DIFFERENT THINGS: MORE THAN HALF THE DAYS

## 2019-12-03 ASSESSMENT — PATIENT HEALTH QUESTIONNAIRE - PHQ9
5. POOR APPETITE OR OVEREATING: SEVERAL DAYS
SUM OF ALL RESPONSES TO PHQ QUESTIONS 1-9: 19

## 2019-12-04 ASSESSMENT — ANXIETY QUESTIONNAIRES: GAD7 TOTAL SCORE: 11

## 2020-02-14 ENCOUNTER — NURSE TRIAGE (OUTPATIENT)
Dept: FAMILY MEDICINE | Facility: CLINIC | Age: 65
End: 2020-02-14

## 2020-02-14 DIAGNOSIS — N39.0 SYMPTOMATIC URINARY TRACT INFECTION: ICD-10-CM

## 2020-02-14 DIAGNOSIS — R39.15 URINARY URGENCY: Primary | ICD-10-CM

## 2020-02-14 DIAGNOSIS — R39.15 URINARY URGENCY: ICD-10-CM

## 2020-02-14 LAB
ALBUMIN UR-MCNC: 30 MG/DL
APPEARANCE UR: ABNORMAL
BACTERIA #/AREA URNS HPF: ABNORMAL /HPF
BILIRUB UR QL STRIP: NEGATIVE
COLOR UR AUTO: YELLOW
GLUCOSE UR STRIP-MCNC: NEGATIVE MG/DL
HGB UR QL STRIP: ABNORMAL
KETONES UR STRIP-MCNC: NEGATIVE MG/DL
LEUKOCYTE ESTERASE UR QL STRIP: ABNORMAL
NITRATE UR QL: NEGATIVE
PH UR STRIP: 5.5 PH (ref 5–7)
RBC #/AREA URNS AUTO: ABNORMAL /HPF
SOURCE: ABNORMAL
SP GR UR STRIP: >1.03 (ref 1–1.03)
UROBILINOGEN UR STRIP-ACNC: 0.2 EU/DL (ref 0.2–1)
WBC #/AREA URNS AUTO: ABNORMAL /HPF

## 2020-02-14 PROCEDURE — 81001 URINALYSIS AUTO W/SCOPE: CPT | Performed by: FAMILY MEDICINE

## 2020-02-14 PROCEDURE — 87086 URINE CULTURE/COLONY COUNT: CPT | Performed by: FAMILY MEDICINE

## 2020-02-14 RX ORDER — NITROFURANTOIN 25; 75 MG/1; MG/1
100 CAPSULE ORAL 2 TIMES DAILY
Qty: 14 CAPSULE | Refills: 0 | Status: SHIPPED | OUTPATIENT
Start: 2020-02-14 | End: 2020-02-21

## 2020-02-14 NOTE — TELEPHONE ENCOUNTER
I did an rx for MAcrobid to our pharmacy here in [place].  Please inform patient. Ideally , it would be great if she could leave a urine for UA/UC prior to starting the abx so we know what bacteria is causing her symptoms.

## 2020-02-14 NOTE — TELEPHONE ENCOUNTER
Pt calling to report that she is having urinary issues. Frequency, urgency, incontinence. Pt stating symptoms started on Monday and have not improved. Pt does state the symptoms are similar to those in the past with UTI's. Pt advised need to be seen or virtual visit, Pt stated she does not have Mychart and Pt is leaving town on a trip tomorrow. Pt advised a lab appointment will be made for testing urine and information routed to PCP for review and advise of further recommendations.       Reason for Disposition    Urinating more frequently than usual (i.e., frequency)    [1] Can't control passage of urine (i.e., urinary incontinence, wetting self) AND [2] present > 2 weeks    Additional Information    Negative: Shock suspected (e.g., cold/pale/clammy skin, too weak to stand, low BP, rapid pulse)    Negative: Sounds like a life-threatening emergency to the triager    Negative: Followed a genital area injury    Negative: Followed a genital area injury (penis, scrotum)    Negative: Vaginal discharge    Negative: Pus (white, yellow) or bloody discharge from end of penis    Negative: [1] Taking antibiotic for urinary tract infection (UTI) AND [2] female    Negative: [1] Taking antibiotic for urinary tract infection (UTI) AND [2] male    Negative: [1] Discomfort (pain, burning or stinging) when passing urine AND [2] pregnant    Negative: [1] Discomfort (pain, burning or stinging) when passing urine AND [2] postpartum < 1 month    Negative: [1] Discomfort (pain, burning or stinging) when passing urine AND [2] female    Negative: [1] Discomfort (pain, burning or stinging) when passing urine AND [2] male    Negative: Pain or itching in the vulvar area    Negative: Pain in scrotum is main symptom    Negative: Blood in the urine is main symptom    Negative: Symptoms arising from use of a urinary catheter (Lay or Coude)    Negative: [1] Unable to urinate (or only a few drops) > 4 hours AND     [2] bladder feels very full  "(e.g., palpable bladder or strong urge to urinate)    Negative: [1] Decreased urination and [2] drinking very little AND [2] dehydration suspected (e.g., dark urine, no urine > 12 hours, very dry mouth, very lightheaded)    Negative: Patient sounds very sick or weak to the triager    Negative: Fever > 100.5 F (38.1 C)    Negative: Side (flank) or lower back pain present    Negative: [1] Can't control passage of urine (i.e., urinary incontinence) AND [2] new onset (< 2 weeks) or worsening    Negative: Bad or foul-smelling urine    Negative: Urination is difficult to start (i.e., hesitancy) or straining    Negative: Dribbling (losing urine) just after finishing urination (i.e., post-void dribbling)    Negative: Has to get out of bed to urinate > 2 times a night (i.e., nocturia)    Negative: All other urine symptoms    Answer Assessment - Initial Assessment Questions  1. SYMPTOM: \"What's the main symptom you're concerned about?\" (e.g., frequency, incontinence)      Tingling, frequency, incontinence  2. ONSET: \"When did the  symptoms  start?\"      monday  3. PAIN: \"Is there any pain?\" If so, ask: \"How bad is it?\" (Scale: 1-10; mild, moderate, severe)      Mild, irritation  4. CAUSE: \"What do you think is causing the symptoms?\"      UTI  5. OTHER SYMPTOMS: \"Do you have any other symptoms?\" (e.g., fever, flank pain, blood in urine, pain with urination)      none  6. PREGNANCY: \"Is there any chance you are pregnant?\" \"When was your last menstrual period?\"      no    Protocols used: [facility]    Niall Evangelista RN   [facility] Triage    "

## 2020-02-14 NOTE — TELEPHONE ENCOUNTER
Called patient @ # below -     Advised of notes below - Patient stated an understanding and agreed with plan.    Arabella Washington RN  [facility]

## 2020-02-14 NOTE — TELEPHONE ENCOUNTER
I called patient and advised that she try to get in before 4 pm as it will be cutting in close with the end of the day and then the weekend and she is leaving town tomorrow. She is in [place] now and I advised her there is an [facility] on [address] if that is easier for her.      Will try and watch for results.      MESSI Nicole, RN, PHN  [facility]  Office: [phone]  Fax: [phone]

## 2020-02-14 NOTE — TELEPHONE ENCOUNTER
Reason for call:  Patient reporting a symptom    Symptom or request: UTI     Duration (how long have symptoms been present): Monday     Have you been treated for this before? No    Additional comments:     Phone Number patient can be reached at:  Home number on file [phone] (home)    Best Time:  anytime     Can we leave a detailed message on this number:  YES    Call taken on 2/14/2020 at 8:02 AM by Penny Vázquez

## 2020-02-14 NOTE — TELEPHONE ENCOUNTER
I spoke to Inessa as she had some questions about lab results. I advised her culture was in process and would take 24-72 hours to get that result back. She says she is going out of town and wondered if she should  medication.  Advised to  and start medication, she will be notified once culture is resulted if medication would need to change.    Johana Quijano R.N.

## 2020-02-15 LAB
BACTERIA SPEC CULT: NORMAL
SPECIMEN SOURCE: NORMAL

## 2020-02-18 DIAGNOSIS — E78.5 HYPERLIPIDEMIA LDL GOAL <130: ICD-10-CM

## 2020-02-18 NOTE — TELEPHONE ENCOUNTER
"Requested Prescriptions   Pending Prescriptions Disp Refills     simvastatin 40 MG PO tablet        Last Written Prescription Date:  9.9.19  Last Fill Quantity: 90 tablet,  # refills: 1   Last office visit: 12/3/2019 with prescribing provider:  Gabriela Moore MD           Future Office Visit:       90 tablet 1     Sig: TAKE 1 TABLET BY MOUTH  EVERY EVENING       Statins Protocol Passed - 2/18/2020  2:15 PM        Passed - LDL on file in past 12 months     Recent Labs   Lab Test 09/09/19  1504   LDL 95             Passed - No abnormal creatine kinase in past 12 months     Recent Labs   Lab Test 08/09/18  1425                   Passed - Recent (12 mo) or future (30 days) visit within the authorizing provider's specialty     Patient has had an office visit with the authorizing provider or a provider within the authorizing providers department within the previous 12 mos or has a future within next 30 days. See \"Patient Info\" tab in inbasket, or \"Choose Columns\" in Meds & Orders section of the refill encounter.              Passed - Medication is active on med list        Passed - Patient is age 18 or older        Passed - No active pregnancy on record        Passed - No positive pregnancy test in past 12 months        "

## 2020-02-19 RX ORDER — SIMVASTATIN 40 MG
TABLET ORAL
Qty: 90 TABLET | Refills: 2 | Status: SHIPPED | OUTPATIENT
Start: 2020-02-19 | End: 2020-09-09

## 2020-02-25 DIAGNOSIS — F33.41 DEPRESSION, MAJOR, RECURRENT, IN PARTIAL REMISSION (H): ICD-10-CM

## 2020-02-26 NOTE — TELEPHONE ENCOUNTER
"Requested Prescriptions   Pending Prescriptions Disp Refills     citalopram (CELEXA) 20 MG tablet [Pharmacy Med Name: CITALOPRAM  20MG  TAB]      Last Written Prescription Date:  9.9.19  Last Fill Quantity: 90 tablet,  # refills: 1   Last office visit: 12/3/2019 with prescribing provider:  Gabriela Moore MD         Future Office Visit:       90 tablet 1     Sig: TAKE 1 TABLET BY MOUTH  DAILY       SSRIs Protocol Failed - 2/25/2020  8:18 PM        Failed - PHQ-9 score less than 5 in past 6 months     Please review last PHQ-9 score.     PHQ-9 SCORE 1/31/2019 9/9/2019 12/3/2019   PHQ-9 Total Score 9 8 19     MIGEL-7 SCORE 1/31/2019 9/9/2019 12/3/2019   Total Score 4 3 11                   Passed - Medication is active on med list        Passed - Patient is age 18 or older        Passed - No active pregnancy on record        Passed - No positive pregnancy test in last 12 months        Passed - Recent (6 mo) or future (30 days) visit within the authorizing provider's specialty     Patient had office visit in the last 6 months or has a visit in the next 30 days with authorizing provider or within the authorizing provider's specialty.  See \"Patient Info\" tab in inbasket, or \"Choose Columns\" in Meds & Orders section of the refill encounter.            "

## 2020-02-27 RX ORDER — CITALOPRAM HYDROBROMIDE 20 MG/1
TABLET ORAL
Qty: 90 TABLET | Refills: 1 | Status: SHIPPED | OUTPATIENT
Start: 2020-02-27 | End: 2020-08-18

## 2020-02-27 NOTE — TELEPHONE ENCOUNTER
Prescription approved per [facility] Refill Protocol.    Niall Evangelista RN   [facility]

## 2020-03-11 ENCOUNTER — TELEPHONE (OUTPATIENT)
Dept: UROLOGY | Facility: CLINIC | Age: 65
End: 2020-03-11

## 2020-03-11 ENCOUNTER — OFFICE VISIT (OUTPATIENT)
Dept: UROLOGY | Facility: CLINIC | Age: 65
End: 2020-03-11
Payer: COMMERCIAL

## 2020-03-11 VITALS
HEART RATE: 61 BPM | WEIGHT: 180 LBS | HEIGHT: 67 IN | DIASTOLIC BLOOD PRESSURE: 78 MMHG | BODY MASS INDEX: 28.25 KG/M2 | SYSTOLIC BLOOD PRESSURE: 126 MMHG

## 2020-03-11 DIAGNOSIS — R39.15 URINARY URGENCY: Primary | ICD-10-CM

## 2020-03-11 LAB
ALBUMIN UR-MCNC: NEGATIVE MG/DL
APPEARANCE UR: CLEAR
BILIRUB UR QL STRIP: NEGATIVE
COLOR UR AUTO: YELLOW
GLUCOSE UR STRIP-MCNC: NEGATIVE MG/DL
HGB UR QL STRIP: NEGATIVE
KETONES UR STRIP-MCNC: NEGATIVE MG/DL
LEUKOCYTE ESTERASE UR QL STRIP: NEGATIVE
NITRATE UR QL: NEGATIVE
PH UR STRIP: 7 PH (ref 5–7)
RESIDUAL VOLUME (RV) (EXTERNAL): 56
SOURCE: NORMAL
SP GR UR STRIP: 1.02 (ref 1–1.03)
UROBILINOGEN UR STRIP-ACNC: 0.2 EU/DL (ref 0.2–1)

## 2020-03-11 PROCEDURE — 81003 URINALYSIS AUTO W/O SCOPE: CPT | Mod: QW | Performed by: PHYSICIAN ASSISTANT

## 2020-03-11 PROCEDURE — 51798 US URINE CAPACITY MEASURE: CPT | Performed by: PHYSICIAN ASSISTANT

## 2020-03-11 PROCEDURE — 99203 OFFICE O/P NEW LOW 30 MIN: CPT | Mod: 25 | Performed by: PHYSICIAN ASSISTANT

## 2020-03-11 RX ORDER — ESTRADIOL 0.1 MG/G
CREAM VAGINAL
Qty: 42.5 G | Refills: 3 | Status: SHIPPED | OUTPATIENT
Start: 2020-03-11 | End: 2021-06-02

## 2020-03-11 RX ORDER — TOLTERODINE 2 MG/1
2 CAPSULE, EXTENDED RELEASE ORAL DAILY
Qty: 90 CAPSULE | Refills: 3 | Status: SHIPPED | OUTPATIENT
Start: 2020-03-11 | End: 2021-06-02

## 2020-03-11 RX ORDER — MIRABEGRON 25 MG/1
25 TABLET, FILM COATED, EXTENDED RELEASE ORAL DAILY
Qty: 90 TABLET | Refills: 4 | Status: SHIPPED | OUTPATIENT
Start: 2020-03-11 | End: 2020-03-11

## 2020-03-11 ASSESSMENT — MIFFLIN-ST. JEOR: SCORE: 1407.27

## 2020-03-11 ASSESSMENT — PAIN SCALES - GENERAL: PAINLEVEL: NO PAIN (0)

## 2020-03-11 NOTE — NURSING NOTE
Chief Complaint   Patient presents with     Urinary incontinence     Post Void Residual: 56 mL    Marielena Mir, EMT

## 2020-03-11 NOTE — TELEPHONE ENCOUNTER
Hello,     Patient would like to change the Estrace cream due to cost. The generic is covered by insurance, however patients out of pocket cost is 171.24$. Would you consider an alternative therapy due to cost?    Also, Myrbetriq and Detrol are both not covered by insurance. They require patient to try oxybutynin ER before they will cover either.       Thanks.    Shannon Shannon

## 2020-03-11 NOTE — PROGRESS NOTES
CC: leakage    HPI:  Inessa Castaneda is a 65 year old female who presents in consultation from Dr. Moore for evaluation of the above. Urine dribbles out en route to the toilet. Frustrated by this. No dysuria or gross hematuria.     Minimal caffeine. Nocturia x 0-1. Bowels are regular. One recent UTI.     S/p bladder sling 01/21/2008 with Dr. Paz (complicated by post op DVT). Mild stress incontinence at times.     Past Medical History:   Diagnosis Date     Adenomatous colon polyp at age 52     normal colonoscopy at age 55 and then at age 60     Cystocele     midline      Depression, major, recurrent, in partial remission (H) dx'd in her late 30's     varied with hormones, too. has been on 4 different medications      DVT (deep venous thrombosis) (H) 2008    s/p bladder sling surgery /repair , no further work up done     Genital herpes      Hyperlipidemia LDL goal <130      Urinary urgency        Past Surgical History:   Procedure Laterality Date     BREAST BIOPSY, RT/LT Right 2000?     benign     BUNIONECTOMY Left 1/21/2008    Dr. Alberto Laws - left foot medial and lateral with left 2nd toe surgery      SLING BLADDER SUSPENSION WITH FASCIA SULAIMAN  1/21/2008    - Dr. Emanuel Paz - had DVT afterward      XR ANKLE SURGERY NIXON LEFT  1/21/2008    Dr. 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      Comment: degree in Elementary Education    Social Needs     Financial resource strain: Not on file     Food insecurity     Worry: Not on file     Inability: Not on file     Transportation needs     Medical: Not on file     Non-medical: Not on file   Tobacco Use      Smoking status: Never Smoker     Smokeless tobacco: Never Used   Substance and Sexual Activity     Alcohol use: Yes     Alcohol/week: 0.0 standard drinks     Comment: 2 times/month  - 1-2 glasses of wine      Drug use: No     Comment: no herbal meds either      Sexual activity: Yes     Partners: Male     Birth control/protection: Surgical     Comment:  -  s/p vasectomy    Lifestyle     Physical activity     Days per week: Not on file     Minutes per session: Not on file     Stress: Not on file   Relationships     Social connections     Talks on phone: Not on file     Gets together: Not on file     Attends Quaker service: Not on file     Active member of club or organization: Not on file     Attends meetings of clubs or organizations: Not on file     Relationship status: Not on file     Intimate partner violence     Fear of current or ex partner: Not on file     Emotionally abused: Not on file     Physically abused: Not on file     Forced sexual activity: Not on file   Other Topics Concern      Service No     Blood Transfusions No     Caffeine Concern Yes     Comment: 1-2 servings of caffeine /week      Occupational Exposure Not Asked     Hobby Hazards Not Asked     Sleep Concern Not Asked     Stress Concern Not Asked     Weight Concern Yes     Special Diet Not Asked     Back Care Not Asked     Exercise Yes     Comment: walking video and cross  at home 1-2x/week      Bike Helmet Not Asked     Seat Belt Yes     Comment: always      Self-Exams Yes     Comment: SBE encouraged monthly      Parent/sibling w/ CABG, MI or angioplasty before 65F 55M? 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  Was tested after that and is HIV negative.  ? Tested for Hep C - will check.         Noted: January 9, 2017 :     Pt's Children - Christopher age 22 - going into the [employer]    Yas - age 20 - in  "college    Nickie - age 18          SisterMargarita = 72 yrs old with schizophrenia and bipolar disorder now in assisted living - pt is a decision maker for her and that's been really stressful.  It was a really tough summer cleaning out her previous home - sister is a hoarder.  Now in assisted , senior living in [place]. Pt has some support from other siblings as well --Gabriela Moore MD 2019          Family History   Problem Relation Age of Onset     Cerebrovascular Disease Mother 88        tiny strokes - TIA -  at age 90     Hypertension Mother      Alzheimer Disease Father 81         age 89     Heart Disease Father 63        s/p MI - first at age 63 , then s/p 4 vessel CABG     Mental Illness Sister         bipolar disorder -with heroin addiction - on methadone - no contact for years      Dementia Sister      Mental Illness Sister         bipolar - 2 with hoarding tendencies, psych haluciantions,       Mental Illness Brother          after fall while drunk - alcoholic      Vascular Disease Brother         dissecting thoracic aortic aneurysm      Hepatitis Brother         Hep C - treated     Mental Illness Sister         hoarding tendencies     Substance Abuse Sister         Opiates      Attention Deficit Disorder Daughter      Substance Abuse Daughter         adderall that was not prescribed       ROS:14 point ROS neg other than the symptoms noted above in the HPI.    Allergies   Allergen Reactions     Sulfa Drugs      rash       Current Outpatient Medications   Medication     calcium carbonate-vitamin D (CALCIUM 600+D) 600-200 MG-UNIT TABS     citalopram (CELEXA) 20 MG tablet     folic acid (FOLVITE) 1 MG tablet     omega 3 1000 MG CAPS     simvastatin 40 MG PO tablet     traZODone (DESYREL) 100 MG tablet     valACYclovir (VALTREX) 500 MG tablet     No current facility-administered medications for this visit.          PEx:   /78   Pulse 61   Ht 1.707 m (5' 7.2\")   Wt " 81.6 kg (180 lb)   BMI 28.02 kg/m      PSYCH: NAD  EYES: EOMI  MOUTH: MMM  NECK: Supple, no notable adenopathy  RESP: Unlabored breathing  CARDIAC: No LE edema  SKIN: Warm, no rashes  ABD: soft, Nontender  NEURO: AAO x3    Urine: neg  PVR 56cc      A/P: Inessa Castaneda is a 65 year old female with urge incontinence, mild stress incontinence  -Reviewed the difference between the two. She could try pelvic floor PT if the stress component becomes bothersome.   -Estrogen cream three times a week near urethra (pea-sized amount): If >$40, let me know and we can get via a compound pharmacy.  -Myrbetriq 25mg daily. SE reviewed.  (called her pharm and this is not covered, try Detrol LA 2mg)  -Recheck 3 months. 1 UA with microhematuria and at this time does not meet AUA guidelines. Recheck in 3  Months.     Yas Zapien PA-C  [facility]    30 minutes were spent with the patient today, > 50% in counseling and coordination of care

## 2020-03-11 NOTE — PATIENT INSTRUCTIONS
Estrogen cream three times a week near urethra (pea-sized amount): If >$40, let me know and we can get via a compound pharmacy.    You have been prescribed medication to help relax your bladder (Myrbetriq or mirabegron).    This medication may help control (or improve) urinary frequency, urgency and urge incontinence.    Common side effects include:  Dry mouth (Biotene mouthwash can help with this.)  Constipation  Drowsiness  Blurred vision    Rare side effect:  Urinary retention

## 2020-03-11 NOTE — TELEPHONE ENCOUNTER
Prior Authorization Retail Medication Request    Medication/Dose: Detrol LA   ICD code (if different than what is on RX):  Urinary urgency [R39.15]  - Primary   Previously Tried and Failed:  unknown  Rationale:  Use oxybutynin or oxybutynin ER  or Toviaz or Oxytrol OTC    Insurance Name:  [facility] [employer]  Insurance ID:  [id number]      -Mely Quiñones,Certified Pharmacy Technician, CP,       [facility], Ph. [phone]

## 2020-03-11 NOTE — LETTER
3/11/2020       RE: Inessa Castaneda  [address]  [place] [address]     Dear Colleague,    Thank you for referring your patient, Inessa Castaneda, to the [facility] [place] at [facility]. Please see a copy of my visit note below.    CC: leakage    HPI:  Inessa Castaneda is a 65 year old female who presents in consultation from Dr. Moore for evaluation of the above. Urine dribbles out en route to the toilet. Frustrated by this. No dysuria or gross hematuria.     Minimal caffeine. Nocturia x 0-1. Bowels are regular. One recent UTI.     S/p bladder sling 01/21/2008 with Dr. Paz (complicated by post op DVT). Mild stress incontinence at times.     Past Medical History:   Diagnosis Date     Adenomatous colon polyp at age 52     normal colonoscopy at age 55 and then at age 60     Cystocele     midline      Depression, major, recurrent, in partial remission (H) dx'd in her late 30's     varied with hormones, too. has been on 4 different medications      DVT (deep venous thrombosis) (H) 2008    s/p bladder sling surgery /repair , no further work up done     Genital herpes      Hyperlipidemia LDL goal <130      Urinary urgency        Past Surgical History:   Procedure Laterality Date     BREAST BIOPSY, RT/LT Right 2000?     benign     BUNIONECTOMY Left 1/21/2008    Dr. Alberto Laws - left foot medial and lateral with left 2nd toe surgery      SLING BLADDER SUSPENSION WITH FASCIA SULAIMAN  1/21/2008    - Dr. Emanuel Paz - had DVT afterward      XR ANKLE SURGERY NIXON LEFT  1/21/2008    Dr. 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      Comment: degree in Elementary Education    Social Needs     Financial resource strain: Not on file     Food insecurity     Worry: Not on file     Inability: Not on file     Transportation needs     Medical: Not on file     Non-medical: Not on file   Tobacco Use     Smoking status: Never Smoker     Smokeless tobacco: Never Used   Substance and Sexual Activity     Alcohol use: Yes     Alcohol/week: 0.0 standard drinks     Comment: 2 times/month  - 1-2 glasses of wine      Drug use: No     Comment: no herbal meds either      Sexual activity: Yes     Partners: Male     Birth control/protection: Surgical     Comment:  -  s/p vasectomy    Lifestyle     Physical activity     Days per week: Not on file     Minutes per session: Not on file     Stress: Not on file   Relationships     Social connections     Talks on phone: Not on file     Gets together: Not on file     Attends Jehovah's witness service: Not on file     Active member of club or organization: Not on file     Attends meetings of clubs or organizations: Not on file     Relationship status: Not on file     Intimate partner violence     Fear of current or ex partner: Not on file     Emotionally abused: Not on file     Physically abused: Not on file     Forced sexual activity: Not on file   Other Topics Concern      Service No     Blood Transfusions No     Caffeine Concern Yes     Comment: 1-2 servings of caffeine /week      Occupational Exposure Not Asked     Hobby Hazards Not Asked     Sleep Concern Not Asked     Stress Concern Not Asked     Weight Concern Yes     Special Diet Not Asked     Back Care Not Asked     Exercise Yes     Comment: walking video and cross  at home 1-2x/week      Bike Helmet Not Asked     Seat Belt Yes     Comment: always      Self-Exams Yes     Comment: SBE encouraged monthly      Parent/sibling w/ CABG, MI or angioplasty before 65F 55M? Not Asked   Social History  Narrative    Was a [employer] volunteer in the [place] in [id number] - contracted genital herpes there.  Was tested after that and is HIV negative.  ? Tested for Hep C - will check.         Noted: 2017 :     Pt's Children - Horacio age 22 - going into the [employer]    Yas - age 22 - in college    Nickie - age 18          SisterMargarita = 72 yrs old with schizophrenia and bipolar disorder now in assisted living - pt is a decision maker for her and that's been really stressful.  It was a really tough summer cleaning out her previous home - sister is a hoarder.  Now in assisted , senior living in [place]. Pt has some support from other siblings as well --Gabriela Moore MD 2019          Family History   Problem Relation Age of Onset     Cerebrovascular Disease Mother 88        tiny strokes - TIA -  at age 90     Hypertension Mother      Alzheimer Disease Father 81         age 89     Heart Disease Father 63        s/p MI - first at age 63 , then s/p 4 vessel CABG     Mental Illness Sister         bipolar disorder -with heroin addiction - on methadone - no contact for years      Dementia Sister      Mental Illness Sister         bipolar - 2 with hoarding tendencies, psych haluciantions,       Mental Illness Brother          after fall while drunk - alcoholic      Vascular Disease Brother         dissecting thoracic aortic aneurysm      Hepatitis Brother         Hep C - treated     Mental Illness Sister         hoarding tendencies     Substance Abuse Sister         Opiates      Attention Deficit Disorder Daughter      Substance Abuse Daughter         adderall that was not prescribed       ROS:14 point ROS neg other than the symptoms noted above in the HPI.    Allergies   Allergen Reactions     Sulfa Drugs      rash       Current Outpatient Medications   Medication     calcium carbonate-vitamin D (CALCIUM 600+D) 600-200 MG-UNIT TABS     citalopram (CELEXA) 20 MG  "tablet     folic acid (FOLVITE) 1 MG tablet     omega 3 1000 MG CAPS     simvastatin 40 MG PO tablet     traZODone (DESYREL) 100 MG tablet     valACYclovir (VALTREX) 500 MG tablet     No current facility-administered medications for this visit.          PEx:   /78   Pulse 61   Ht 1.707 m (5' 7.2\")   Wt 81.6 kg (180 lb)   BMI 28.02 kg/m      PSYCH: NAD  EYES: EOMI  MOUTH: MMM  NECK: Supple, no notable adenopathy  RESP: Unlabored breathing  CARDIAC: No LE edema  SKIN: Warm, no rashes  ABD: soft, Nontender  NEURO: AAO x3    Urine: neg  PVR 56cc      A/P: Inessa Castaneda is a 65 year old female with urge incontinence, mild stress incontinence  -Reviewed the difference between the two. She could try pelvic floor PT if the stress component becomes bothersome.   -Estrogen cream three times a week near urethra (pea-sized amount): If >$40, let me know and we can get via a compound pharmacy.  -Myrbetriq 25mg daily. SE reviewed.  (called her pharm and this is not covered, try Detrol LA 2mg)  -Recheck 3 months. 1 UA with microhematuria and at this time does not meet AUA guidelines. Recheck in 3  Months.     JHON Bhakta [facility]    30 minutes were spent with the patient today, > 50% in counseling and coordination of care                "

## 2020-03-12 DIAGNOSIS — R39.15 URINARY URGENCY: Primary | ICD-10-CM

## 2020-03-12 RX ORDER — OXYBUTYNIN CHLORIDE 5 MG/1
5 TABLET, EXTENDED RELEASE ORAL DAILY
Qty: 90 TABLET | Refills: 3 | Status: SHIPPED | OUTPATIENT
Start: 2020-03-12 | End: 2021-06-02 | Stop reason: DRUGHIGH

## 2020-03-12 NOTE — TELEPHONE ENCOUNTER
Patient informed  RX for Ditropan  Sent in  And RX for estrogen cream ready  To be faxed  once Yas signs the RX.Arabella Shaw LPN

## 2020-03-23 ENCOUNTER — TELEPHONE (OUTPATIENT)
Dept: UROLOGY | Facility: CLINIC | Age: 65
End: 2020-03-23

## 2020-03-23 NOTE — TELEPHONE ENCOUNTER
Called Inessa back to f/u. She's using oxybutynin for her bladder leakage and she reports good results. She hasn't started the estrogen cream yet but is going to check the price with her pharmacy. She may call back to have the Rx re-directed to [facility], if price of the Estrace is too high.  MAC Mueller RN      [facility]    Phone Message    May a detailed message be left on voicemail: yes     Reason for Call: Medication Question or concern regarding medication   Prescription Clarification  Name of Medication: estradiol (ESTRACE VAGINAL) 0.1 MG/GM vaginal cream  Prescribing Provider: Yas Zapien   Pharmacy: [address]    What on the order needs clarification? Inessa calling to report that she started taking the oral medications right away prior to getting the cream.  She is reporting a big improvement since starting the tablets and is wondering if she really even needs the cream.  She also has questions on how the cream and the tablets work together.  Please call her back to advise          Action Taken: Message routed to:  Other: [facility]    Travel Screening: Not Applicable

## 2020-03-24 NOTE — TELEPHONE ENCOUNTER
.[facility] Call [facility]    Phone Message    May a detailed message be left on voicemail: yes     Reason for Call: Medication Question or concern regarding medication   Prescription Clarification  Name of Medication: estradiol (ESTRACE VAGINAL) 0.1 MG/GM vaginal cream  Prescribing Provider: Yas Zapien              Pharmacy: [facility] - [address]               What on the order needs clarification? Pt checked with pharmacy and the cream will be $172.00 for her, so she would like to go with [facility] for this, since per the call she had yesterday, would probably only be in the $40 range.     Action Taken: [facility] Uro    Travel Screening: Not Applicable

## 2020-03-24 NOTE — TELEPHONE ENCOUNTER
Called [facility] into [facility] and notified patient that they should be calling her.     Amanda Lockwood LPN

## 2020-03-30 ENCOUNTER — TELEPHONE (OUTPATIENT)
Dept: UROLOGY | Facility: CLINIC | Age: 65
End: 2020-03-30

## 2020-03-30 NOTE — TELEPHONE ENCOUNTER
[facility]    Phone Message    May a detailed message be left on voicemail: yes     Reason for Call: Other: Inessa calling in regards to the vaginal cream she picked up from [facility] since the other cream that Yas Zapien PA-C had originally prescribed was too expensive. Inessa used the cream for the first time on 3/27/20 and the next morning on 3/28/20 she had a tingling feeling. Then, Inessa says three times that day urine just dropped out of her like three times and soaked her pants. Inessa is concerned that this will happen again if she continues to use the cream. Please give Inessa a call back at your earliest convenience to discuss.     Action Taken: Message routed to:  Other: [facility] Uro    Travel Screening: Not Applicable

## 2020-03-30 NOTE — TELEPHONE ENCOUNTER
Spoke to patient instructed to try it again this is not a typical  Side effect from  This medication.Arabella Shaw LPN

## 2020-04-08 ENCOUNTER — TELEPHONE (OUTPATIENT)
Dept: UROLOGY | Facility: CLINIC | Age: 65
End: 2020-04-08

## 2020-04-08 DIAGNOSIS — R39.15 URGENCY OF URINATION: Primary | ICD-10-CM

## 2020-04-08 RX ORDER — OXYBUTYNIN CHLORIDE 5 MG/1
5 TABLET, EXTENDED RELEASE ORAL DAILY
Qty: 90 TABLET | Refills: 0 | Status: SHIPPED | OUTPATIENT
Start: 2020-04-08 | End: 2020-06-15

## 2020-04-08 NOTE — TELEPHONE ENCOUNTER
[facility]    Phone Message    May a detailed message be left on voicemail: yes     Reason for Call: Medication Question or concern regarding medication   Prescription Clarification  Name of Medication: oxybutynin ER (DITROPAN-XL) 5 MG 24 hr tablet     Prescribing Provider: Yas Zapien   Pharmacy: Pt requesting new order for 90 day supply vs 30 day supply as this is more affordable for her.    What on the order needs clarification? Patient would like new Rx sent to [facility]      *Pt also wants Yas to know how much this medication is helping her, she is very happy and thankful*        Action Taken: Message routed to:  [facility] Kalli Webster    Travel Screening: Not Applicable

## 2020-06-14 DIAGNOSIS — R39.15 URGENCY OF URINATION: ICD-10-CM

## 2020-06-15 RX ORDER — OXYBUTYNIN CHLORIDE 5 MG/1
TABLET, EXTENDED RELEASE ORAL
Qty: 90 TABLET | Refills: 0 | Status: SHIPPED | OUTPATIENT
Start: 2020-06-15 | End: 2020-09-08

## 2020-07-06 ENCOUNTER — TELEPHONE (OUTPATIENT)
Dept: FAMILY MEDICINE | Facility: CLINIC | Age: 65
End: 2020-07-06

## 2020-07-06 DIAGNOSIS — F33.41 DEPRESSION, MAJOR, RECURRENT, IN PARTIAL REMISSION (H): ICD-10-CM

## 2020-07-06 DIAGNOSIS — F51.05 INSOMNIA DUE TO OTHER MENTAL DISORDER: ICD-10-CM

## 2020-07-06 DIAGNOSIS — F99 INSOMNIA DUE TO OTHER MENTAL DISORDER: ICD-10-CM

## 2020-07-07 RX ORDER — TRAZODONE HYDROCHLORIDE 100 MG/1
TABLET ORAL
Qty: 90 TABLET | Refills: 0 | Status: SHIPPED | OUTPATIENT
Start: 2020-07-07 | End: 2020-09-09

## 2020-07-07 NOTE — TELEPHONE ENCOUNTER
Routing refill request to provider for review/approval because:  Drug interaction warning high with trazodone and citalopram    Marlen HANNON RN  [facility] Triage

## 2020-07-07 NOTE — TELEPHONE ENCOUNTER
RX done, please due phq/alejandra    Advise phone/video visit follow up soon    Fasting labs soon    CPX when able after 9/9

## 2020-07-13 ASSESSMENT — PATIENT HEALTH QUESTIONNAIRE - PHQ9
5. POOR APPETITE OR OVEREATING: NOT AT ALL
SUM OF ALL RESPONSES TO PHQ QUESTIONS 1-9: 5

## 2020-07-13 ASSESSMENT — ANXIETY QUESTIONNAIRES
3. WORRYING TOO MUCH ABOUT DIFFERENT THINGS: SEVERAL DAYS
1. FEELING NERVOUS, ANXIOUS, OR ON EDGE: MORE THAN HALF THE DAYS
2. NOT BEING ABLE TO STOP OR CONTROL WORRYING: SEVERAL DAYS
GAD7 TOTAL SCORE: 4
7. FEELING AFRAID AS IF SOMETHING AWFUL MIGHT HAPPEN: NOT AT ALL
6. BECOMING EASILY ANNOYED OR IRRITABLE: NOT AT ALL
IF YOU CHECKED OFF ANY PROBLEMS ON THIS QUESTIONNAIRE, HOW DIFFICULT HAVE THESE PROBLEMS MADE IT FOR YOU TO DO YOUR WORK, TAKE CARE OF THINGS AT HOME, OR GET ALONG WITH OTHER PEOPLE: NOT DIFFICULT AT ALL
5. BEING SO RESTLESS THAT IT IS HARD TO SIT STILL: NOT AT ALL

## 2020-07-13 NOTE — TELEPHONE ENCOUNTER
Called # [phone]     PHQ 9/9/2019 12/3/2019 7/13/2020   PHQ-9 Total Score 8 19 5   Q9: Thoughts of better off dead/self-harm past 2 weeks Not at all Several days Not at all     MIGEL-7 SCORE 9/9/2019 12/3/2019 7/13/2020   Total Score 3 11 4         Pt will call back for scheduling CPX      Niall Evangelista RN   [facility]

## 2020-07-14 ASSESSMENT — ANXIETY QUESTIONNAIRES: GAD7 TOTAL SCORE: 4

## 2020-07-15 ENCOUNTER — TELEPHONE (OUTPATIENT)
Dept: UROLOGY | Facility: CLINIC | Age: 65
End: 2020-07-15

## 2020-07-15 ENCOUNTER — TELEPHONE (OUTPATIENT)
Dept: FAMILY MEDICINE | Facility: CLINIC | Age: 65
End: 2020-07-15

## 2020-07-15 NOTE — TELEPHONE ENCOUNTER
[facility] Call Center    Phone Message    May a detailed message be left on voicemail: yes     Reason for Call: Medication Question or concern regarding medication   Prescription Clarification  Name of Medication: oxybutynin ER (DITROPAN-XL) 5 MG 24 hr tablet  Prescribing Provider: Yas Zapien   Pharmacy: [facility] - [address]    Continue to take? Inessa calling to ask about her oxybutynin ER (DITROPAN-XL) 5 MG 24 hr tablet prescription. Inessa is out of the medication, but she noticed it didn't have any refills on it, so she wasn't sure if Yas wanted her to continue taking it after the 90 days. If Yas would like her to continue, Inessa would like to request a refill. Please give Inessa a call back at your earliest convenience to discuss.          Action Taken: [facility] Uro    Travel Screening: Not Applicable

## 2020-07-15 NOTE — TELEPHONE ENCOUNTER
Reason for Call:  Other prescription    Detailed comments: Pt is calling to speak with a nurse. States on their traZODone (DESYREL) 100 MG tablet prescription bottle, it states to take nightly as needed. Pt was under the impression they should take it regularly since it also is supposed to help with depression. Please call back when possible. Thanks!    Phone Number Patient can be reached at: Cell number on file:    Telephone Information:   Mobile [phone]       Best Time: any    Can we leave a detailed message on this number? YES    Call taken on 7/15/2020 at 3:58 PM by Fatmata Larson

## 2020-07-15 NOTE — TELEPHONE ENCOUNTER
"Per OV on 12/3/2019  \"Depression, major, recurrent, in partial remission (H), Insomnia due to other mental disorder- mild depression/anxiety   Suboptimally controlled.  Restart trazodone, start psychotherapy.  Continue citalopram.  F/u phone visit in 6 weeks.  -     MENTAL HEALTH REFERRAL  - Adult; Outpatient Treatment; Individual/Couples/Family/Group Therapy/Health Psychology; [facility]: [facility] [phone]; We will contact you to schedule the appointment or please call with any questions  -     traZODone (DESYREL) 100 MG tablet; Take 1 tablet (100 mg) by mouth nightly as needed for sleep\"        Pt was to follow up in 6 weeks and never did    Please see message below and note above     Please advise     Thank you     Madeline Lewis RN, BSN  [place] [facility]           "

## 2020-07-15 NOTE — TELEPHONE ENCOUNTER
Medication was refilled in June  But she does need to  Make an appointment  To see Yas for a repeat urine test.Arabella Shaw LPN

## 2020-07-17 NOTE — TELEPHONE ENCOUNTER
PHQ-9 score:    PHQ 7/13/2020   PHQ-9 Total Score 5   Q9: Thoughts of better off dead/self-harm past 2 weeks Not at all     Ok to fill for sleep.

## 2020-08-13 DIAGNOSIS — F33.41 DEPRESSION, MAJOR, RECURRENT, IN PARTIAL REMISSION (H): ICD-10-CM

## 2020-08-17 NOTE — TELEPHONE ENCOUNTER
Routing refill request to provider for review/approval because:  Labs out of range:  phq9    MESSI RodriguezN, RN  [facility] Triage

## 2020-08-18 RX ORDER — CITALOPRAM HYDROBROMIDE 20 MG/1
TABLET ORAL
Qty: 90 TABLET | Refills: 3 | Status: SHIPPED | OUTPATIENT
Start: 2020-08-18 | End: 2021-07-06

## 2020-08-19 NOTE — TELEPHONE ENCOUNTER
[facility] Follow-up to PHQ 9/9/2019 12/3/2019 7/13/2020   PHQ-9 9. Suicide Ideation past 2 weeks Not at all Several days Not at all     Last PHQ-9 7/13/2020   1.  Little interest or pleasure in doing things 0   2.  Feeling down, depressed, or hopeless 0   3.  Trouble falling or staying asleep, or sleeping too much 0   4.  Feeling tired or having little energy 0   5.  Poor appetite or overeating 3   6.  Feeling bad about yourself 2   7.  Trouble concentrating 0   8.  Moving slowly or restless 0   Q9: Thoughts of better off dead/self-harm past 2 weeks 0   PHQ-9 Total Score 5   Difficulty at work, home, or with people Not difficult at all     MIGEL-7  7/13/2020   1. Feeling nervous, anxious, or on edge 2   2. Not being able to stop or control worrying 1   3. Worrying too much about different things 1   4. Trouble relaxing 0   5. Being so restless that it is hard to sit still 0   6. Becoming easily annoyed or irritable 0   7. Feeling afraid, as if something awful might happen 0   MIGEL-7 Total Score 4   If you checked any problems, how difficult have they made it for you to do your work, take care of things at home, or get along with other people? Not difficult at all

## 2020-09-07 DIAGNOSIS — R39.15 URGENCY OF URINATION: ICD-10-CM

## 2020-09-08 RX ORDER — OXYBUTYNIN CHLORIDE 5 MG/1
TABLET, EXTENDED RELEASE ORAL
Qty: 90 TABLET | Refills: 2 | Status: SHIPPED | OUTPATIENT
Start: 2020-09-08 | End: 2021-06-02

## 2020-09-09 DIAGNOSIS — F51.05 INSOMNIA DUE TO OTHER MENTAL DISORDER: ICD-10-CM

## 2020-09-09 DIAGNOSIS — A60.00 GENITAL HERPES SIMPLEX, UNSPECIFIED SITE: ICD-10-CM

## 2020-09-09 DIAGNOSIS — F33.41 DEPRESSION, MAJOR, RECURRENT, IN PARTIAL REMISSION (H): ICD-10-CM

## 2020-09-09 DIAGNOSIS — E78.5 HYPERLIPIDEMIA LDL GOAL <130: ICD-10-CM

## 2020-09-09 DIAGNOSIS — F99 INSOMNIA DUE TO OTHER MENTAL DISORDER: ICD-10-CM

## 2020-09-09 RX ORDER — SIMVASTATIN 40 MG
TABLET ORAL
Qty: 30 TABLET | Refills: 0 | Status: SHIPPED | OUTPATIENT
Start: 2020-09-09 | End: 2021-04-29

## 2020-09-09 RX ORDER — VALACYCLOVIR HYDROCHLORIDE 500 MG/1
500 TABLET, FILM COATED ORAL 2 TIMES DAILY
Qty: 6 TABLET | Refills: 0 | Status: SHIPPED | OUTPATIENT
Start: 2020-09-09 | End: 2022-03-01

## 2020-09-09 RX ORDER — TRAZODONE HYDROCHLORIDE 100 MG/1
TABLET ORAL
Qty: 30 TABLET | Refills: 0 | Status: SHIPPED | OUTPATIENT
Start: 2020-09-09 | End: 2020-11-23

## 2020-09-09 NOTE — TELEPHONE ENCOUNTER
"   Last office visit: 9/9/2019   Future Office Visit:          Requested Prescriptions   Pending Prescriptions Disp Refills     traZODone (DESYREL) 100 MG tablet [Pharmacy Med Name: TRAZODONE  100MG  TAB] 90 tablet 3     Sig: TAKE 1 TABLET BY MOUTH AT  NIGHT AS NEEDED FOR SLEEP       Serotonin Modulators Passed - 9/9/2020 10:07 AM        Passed - Recent (12 mo) or future (30 days) visit within the authorizing provider's specialty     Patient has had an office visit with the authorizing provider or a provider within the authorizing providers department within the previous 12 mos or has a future within next 30 days. See \"Patient Info\" tab in inbasket, or \"Choose Columns\" in Meds & Orders section of the refill encounter.              Passed - Medication is active on med list        Passed - Patient is age 18 or older        Passed - No active pregnancy on record        Passed - No positive pregnancy test in past 12 months           Due for an Office visit for further refills, only fill for 30 days     Madeline Lewis RN, BSN  [place] [facility]     "

## 2020-09-09 NOTE — TELEPHONE ENCOUNTER
"simvastatin 40 MG PO tablet  90 tablet  2  2/19/2020   No    Sig: TAKE 1 TABLET BY MOUTH  EVERY EVENING    Sent to pharmacy as: Simvastatin 40 MG Oral Tablet (ZOCOR       Last office visit: 9/9/2019 with prescribing provider:    Future Office Visit:          Requested Prescriptions   Pending Prescriptions Disp Refills     simvastatin (ZOCOR) 40 MG tablet [Pharmacy Med Name: SIMVASTATIN  40MG  TAB] 90 tablet 3     Sig: TAKE 1 TABLET BY MOUTH  EVERY EVENING       Statins Protocol Failed - 9/9/2020 10:07 AM        Failed - LDL on file in past 12 months     Recent Labs   Lab Test 09/09/19  1504   LDL 95             Passed - No abnormal creatine kinase in past 12 months     Recent Labs   Lab Test 08/09/18  1425                   Passed - Recent (12 mo) or future (30 days) visit within the authorizing provider's specialty     Patient has had an office visit with the authorizing provider or a provider within the authorizing providers department within the previous 12 mos or has a future within next 30 days. See \"Patient Info\" tab in inbasket, or \"Choose Columns\" in Meds & Orders section of the refill encounter.              Passed - Medication is active on med list        Passed - Patient is age 18 or older        Passed - No active pregnancy on record        Passed - No positive pregnancy test in past 12 months           Due for an Office visit for further refills, only fill for 30 days     Madeline Lewis RN, BSN  [facility]     "

## 2020-09-10 NOTE — TELEPHONE ENCOUNTER
"valACYclovir (VALTREX) 500 MG tablet  6 tablet  1  9/9/2019   No    Sig - Route: Take 1 tablet (500 mg) by mouth 2 times daily - Oral    Patient not taking: Reported on 3/11/2020         Sent to pharmacy as: valACYclovir (VALTREX) 500 MG tablet       Last office visit: 9/9/2019 with prescribing provider:    Future Office Visit:          Requested Prescriptions   Pending Prescriptions Disp Refills     valACYclovir (VALTREX) 500 MG tablet 6 tablet 1     Sig: Take 1 tablet (500 mg) by mouth 2 times daily       Antivirals for Herpes Protocol Failed - 9/9/2020  1:51 PM        Failed - Normal serum creatinine on file in past 12 months     Recent Labs   Lab Test 09/09/19  1504   CR 0.83       Ok to refill medication if creatinine is low          Passed - Patient is age 12 or older        Passed - Recent (12 mo) or future (30 days) visit within the authorizing provider's specialty     Patient has had an office visit with the authorizing provider or a provider within the authorizing providers department within the previous 12 mos or has a future within next 30 days. See \"Patient Info\" tab in inbasket, or \"Choose Columns\" in Meds & Orders section of the refill encounter.              Passed - Medication is active on med list             Due for an Office visit for further refills, only fill for 30 days     Madeline Lewis RN, BSN  [facility]       "

## 2020-11-22 DIAGNOSIS — F99 INSOMNIA DUE TO OTHER MENTAL DISORDER: ICD-10-CM

## 2020-11-22 DIAGNOSIS — F33.41 DEPRESSION, MAJOR, RECURRENT, IN PARTIAL REMISSION (H): ICD-10-CM

## 2020-11-22 DIAGNOSIS — F51.05 INSOMNIA DUE TO OTHER MENTAL DISORDER: ICD-10-CM

## 2020-11-23 RX ORDER — TRAZODONE HYDROCHLORIDE 100 MG/1
TABLET ORAL
Qty: 30 TABLET | Refills: 0 | Status: SHIPPED | OUTPATIENT
Start: 2020-11-23 | End: 2020-12-22

## 2020-11-23 NOTE — TELEPHONE ENCOUNTER
Routing refill request to provider for review/approval because:  Drug interaction warning: citalopram and trazodone.   Patient needs to be seen because:  Due for yearly follow up 12/3/20,  Also routed to team to assist with scheduling.  Archana Carreon RN

## 2020-11-25 NOTE — TELEPHONE ENCOUNTER
Prescription faxed to [facility] at [phone].          Mari Campbell

## 2020-12-22 DIAGNOSIS — F33.41 DEPRESSION, MAJOR, RECURRENT, IN PARTIAL REMISSION (H): ICD-10-CM

## 2020-12-22 DIAGNOSIS — F51.05 INSOMNIA DUE TO OTHER MENTAL DISORDER: ICD-10-CM

## 2020-12-22 DIAGNOSIS — F99 INSOMNIA DUE TO OTHER MENTAL DISORDER: ICD-10-CM

## 2020-12-23 RX ORDER — TRAZODONE HYDROCHLORIDE 100 MG/1
TABLET ORAL
Qty: 30 TABLET | Refills: 0 | Status: SHIPPED | OUTPATIENT
Start: 2020-12-23 | End: 2021-01-19

## 2020-12-24 ENCOUNTER — NURSE TRIAGE (OUTPATIENT)
Dept: NURSING | Facility: CLINIC | Age: 65
End: 2020-12-24

## 2020-12-24 NOTE — TELEPHONE ENCOUNTER
RN triage   Call from pt   Pt states she was contacted by her mail order pharmacy and told trazadone will not be refilled   Appeared to be ordered and sent yesterday and note re:  Need PX appt before next refill   Pt will call pharmacy -- and transferred to  for PX appt   Gladis Robert RN  [facility]  Triage Nurse Advisor      COVID 19 Nurse Triage Plan/Patient Instructions    Please be aware that novel coronavirus (COVID-19) may be circulating in the community. If you develop symptoms such as fever, cough, or SOB or if you have concerns about the presence of another infection including coronavirus (COVID-19), please contact your health care provider or visit [email or web address].     Disposition/Instructions    Home care recommended. Follow home care protocol based instructions.    Thank you for taking steps to prevent the spread of this virus.  o Limit your contact with others.  o Wear a simple mask to cover your cough.  o Wash your hands well and often.    Resources    [facility]: About COVID-19: [email or web address]    CDC: What to Do If You're Sick: www.cdc.gov/coronavirus/2019-ncov/about/steps-when-sick.html    CDC: Ending Home Isolation: www.cdc.gov/coronavirus/2019-ncov/hcp/disposition-in-home-patients.html     CDC: Caring for Someone: www.cdc.gov/coronavirus/2019-ncov/if-you-are-sick/care-for-someone.html     [facility]: Interim Guidance for Hospital Discharge to Home: www.health.[facility].[place]/diseases/coronavirus/hcp/hospdischarge.pdf    [facility] clinical trials (COVID-19 research studies): [email or web address]/[email or web address]-clinical-trials     Below are the COVID-19 hotlines at the [facility] ([facility]). Interpreters are available.   o For health questions: Call 728-810-3549 or 1-452.776.4766 (7 a.m. to 7 p.m.)  o For questions about schools and childcare: Call 402-641-1791 or 1-423.700.2496 (7 a.m. to 7 p.m.)                       Additional Information    Caller has  medication question only, adult not sick, and triager answers question    Protocols used: MEDICATION QUESTION CALL-A-[place]

## 2021-01-07 ENCOUNTER — TELEPHONE (OUTPATIENT)
Dept: FAMILY MEDICINE | Facility: CLINIC | Age: 66
End: 2021-01-07

## 2021-01-07 NOTE — TELEPHONE ENCOUNTER
Patient called with some questions regarding a [facility] Screen brochure she received in the mail, is wondering if there is any validity to these screening tests and if her  Provider thinks this is something she should have done or if is completely not necessary and to just ignore.    The [facility] screening consists of a Carotid Artery Screen to look for plaque, a Heart Rhythm Screen looking at Afib, a AAA screen, a PAD screen and ant Osteoporosis Risk Assessment screen.    Writer did note to patient that patient's have completed these screenings on their own before, is generally not covered by insurance so likely to pay out of pocket (if proceed, may want to check in to any insurance coverage first) and usually we ask for a copy so can be scanned in to electronic record.    Patient would like PCP's advice or thoughts on doing the [facility] Screen.   Routing to provider.    Sofi Gomez RN  [facility]

## 2021-01-07 NOTE — LETTER
[address]  [facility] [address]  [phone]       January 15, 2021    Inessa Castaneda  [id number] [facility] [address]    To Whom it May Concern:    My staff have been attempting to reach you in regards to a recent inquiry about LifeLine Screening.    If you can afford the LifeLine screening , it is a good first line grouping of health screening tests for people over 60.    The reason we don't do them routinely is that they are not considered cost effective to do for the general over 60 population.      Please contact my office at [phone] with further questions or concerns.     Thank you for your time.      Sincerely,       Gabriela Moore M.D./GRISELDA RN

## 2021-01-14 NOTE — TELEPHONE ENCOUNTER
Attempt # 1  Called # [phone]     Left a non detailed VM to call back at [phone] and ask for any available Triage Nurse.    Niall Evangelista RN   [facility]

## 2021-01-14 NOTE — TELEPHONE ENCOUNTER
If pt can afford the LifeLine screening , it is a good first line grouping of health screening tests for people over 60.      The reason we don't do them routinely is that they are not considered cost effective to do for the general over 60 population.      Please inform patient.

## 2021-01-15 NOTE — TELEPHONE ENCOUNTER
Attempt #2  Called patient @ # below - Unable to LM (mailbox full)    Letter sent      Arabella Washington RN  [facility]

## 2021-04-10 DIAGNOSIS — R39.15 URGENCY OF URINATION: ICD-10-CM

## 2021-04-12 RX ORDER — OXYBUTYNIN CHLORIDE 5 MG/1
TABLET, EXTENDED RELEASE ORAL
Qty: 90 TABLET | Refills: 3 | OUTPATIENT
Start: 2021-04-12

## 2021-04-26 DIAGNOSIS — R39.15 URGENCY OF URINATION: ICD-10-CM

## 2021-04-27 RX ORDER — OXYBUTYNIN CHLORIDE 5 MG/1
TABLET, EXTENDED RELEASE ORAL
Qty: 90 TABLET | Refills: 3 | OUTPATIENT
Start: 2021-04-27

## 2021-04-29 DIAGNOSIS — E78.5 HYPERLIPIDEMIA LDL GOAL <130: ICD-10-CM

## 2021-04-29 RX ORDER — SIMVASTATIN 40 MG
40 TABLET ORAL AT BEDTIME
Qty: 90 TABLET | Refills: 0 | Status: SHIPPED | OUTPATIENT
Start: 2021-04-29 | End: 2021-08-02

## 2021-04-29 NOTE — TELEPHONE ENCOUNTER
Medication Question or Refill    Who is calling: patient    What medication are you calling about (include dose and sig)?: simvastatin (ZOCOR) 40 MG tablet      Who prescribed the medication?: Oscar    Do you need a refill? Yes: physical was rescheduled and pt forgot to ask for a refill please give her a 30 day supply    When did you use the medication last?  One month ago    Patient offered an appointment? Yes: already has phys scheduled    Do you have any questions or concerns?  Yes: is there a problem if she has been off for a month     Requested Pharmacy: [facility]    Okay to leave a detailed message?: Yes at Cell number on file:    Telephone Information:   Mobile [phone]

## 2021-04-29 NOTE — TELEPHONE ENCOUNTER
Next 5 appointments (look out 90 days)    Jun 02, 2021  4:20 PM  PHYSICAL with Gabriela Moore MD  [facility] ([facility] ) [address] [place] [id number] [phone]        Medication is being filled for 1 time refill only due to:  Patient needs to be seen because due for PX.      Arabella Washington RN  [facility]

## 2021-06-02 ENCOUNTER — OFFICE VISIT (OUTPATIENT)
Dept: FAMILY MEDICINE | Facility: CLINIC | Age: 66
End: 2021-06-02
Payer: COMMERCIAL

## 2021-06-02 VITALS
HEIGHT: 67 IN | RESPIRATION RATE: 16 BRPM | HEART RATE: 65 BPM | DIASTOLIC BLOOD PRESSURE: 80 MMHG | BODY MASS INDEX: 23.07 KG/M2 | SYSTOLIC BLOOD PRESSURE: 120 MMHG | OXYGEN SATURATION: 100 % | TEMPERATURE: 97.9 F | WEIGHT: 147 LBS

## 2021-06-02 DIAGNOSIS — Z78.0 ASYMPTOMATIC MENOPAUSAL STATE: ICD-10-CM

## 2021-06-02 DIAGNOSIS — F33.41 DEPRESSION, MAJOR, RECURRENT, IN PARTIAL REMISSION (H): ICD-10-CM

## 2021-06-02 DIAGNOSIS — Z15.89 HETEROZYGOUS MTHFR MUTATION C677T: ICD-10-CM

## 2021-06-02 DIAGNOSIS — R39.15 URINARY URGENCY: ICD-10-CM

## 2021-06-02 DIAGNOSIS — E78.5 HYPERLIPIDEMIA LDL GOAL <130: ICD-10-CM

## 2021-06-02 DIAGNOSIS — N39.41 URGE INCONTINENCE OF URINE: ICD-10-CM

## 2021-06-02 DIAGNOSIS — E04.9 ENLARGED THYROID GLAND: ICD-10-CM

## 2021-06-02 DIAGNOSIS — Z12.4 SCREENING FOR MALIGNANT NEOPLASM OF CERVIX: ICD-10-CM

## 2021-06-02 DIAGNOSIS — Z12.31 VISIT FOR SCREENING MAMMOGRAM: ICD-10-CM

## 2021-06-02 DIAGNOSIS — N81.2 FIRST DEGREE UTERINE PROLAPSE: ICD-10-CM

## 2021-06-02 DIAGNOSIS — D22.9 MULTIPLE PIGMENTED NEVI: ICD-10-CM

## 2021-06-02 DIAGNOSIS — Z12.11 SCREEN FOR COLON CANCER: ICD-10-CM

## 2021-06-02 DIAGNOSIS — D12.6 ADENOMATOUS POLYP OF COLON, UNSPECIFIED PART OF COLON: ICD-10-CM

## 2021-06-02 DIAGNOSIS — R39.15 URGENCY OF URINATION: ICD-10-CM

## 2021-06-02 DIAGNOSIS — F99 INSOMNIA DUE TO OTHER MENTAL DISORDER: ICD-10-CM

## 2021-06-02 DIAGNOSIS — F51.05 INSOMNIA DUE TO OTHER MENTAL DISORDER: ICD-10-CM

## 2021-06-02 DIAGNOSIS — Z00.00 ROUTINE HISTORY AND PHYSICAL EXAMINATION OF ADULT: Primary | ICD-10-CM

## 2021-06-02 DIAGNOSIS — Z00.00 ROUTINE GENERAL MEDICAL EXAMINATION AT A HEALTH CARE FACILITY: ICD-10-CM

## 2021-06-02 LAB
ALBUMIN UR-MCNC: NEGATIVE MG/DL
APPEARANCE UR: CLEAR
BACTERIA #/AREA URNS HPF: ABNORMAL /HPF
BASOPHILS # BLD AUTO: 0.1 10E9/L (ref 0–0.2)
BASOPHILS NFR BLD AUTO: 1 %
BILIRUB UR QL STRIP: NEGATIVE
COLOR UR AUTO: YELLOW
DIFFERENTIAL METHOD BLD: NORMAL
EOSINOPHIL # BLD AUTO: 0.3 10E9/L (ref 0–0.7)
EOSINOPHIL NFR BLD AUTO: 6.3 %
ERYTHROCYTE [DISTWIDTH] IN BLOOD BY AUTOMATED COUNT: 13.8 % (ref 10–15)
GLUCOSE UR STRIP-MCNC: NEGATIVE MG/DL
HCT VFR BLD AUTO: 37.4 % (ref 35–47)
HGB BLD-MCNC: 12.7 G/DL (ref 11.7–15.7)
HGB UR QL STRIP: NEGATIVE
KETONES UR STRIP-MCNC: NEGATIVE MG/DL
LEUKOCYTE ESTERASE UR QL STRIP: NEGATIVE
LYMPHOCYTES # BLD AUTO: 1.6 10E9/L (ref 0.8–5.3)
LYMPHOCYTES NFR BLD AUTO: 32.1 %
MCH RBC QN AUTO: 32.2 PG (ref 26.5–33)
MCHC RBC AUTO-ENTMCNC: 34 G/DL (ref 31.5–36.5)
MCV RBC AUTO: 95 FL (ref 78–100)
MONOCYTES # BLD AUTO: 0.4 10E9/L (ref 0–1.3)
MONOCYTES NFR BLD AUTO: 7.4 %
NEUTROPHILS # BLD AUTO: 2.7 10E9/L (ref 1.6–8.3)
NEUTROPHILS NFR BLD AUTO: 53.2 %
NITRATE UR QL: NEGATIVE
NON-SQ EPI CELLS #/AREA URNS LPF: ABNORMAL /LPF
PH UR STRIP: 6 PH (ref 5–7)
PLATELET # BLD AUTO: 167 10E9/L (ref 150–450)
RBC # BLD AUTO: 3.94 10E12/L (ref 3.8–5.2)
RBC #/AREA URNS AUTO: ABNORMAL /HPF
SOURCE: ABNORMAL
SP GR UR STRIP: <=1.005 (ref 1–1.03)
UROBILINOGEN UR STRIP-ACNC: 0.2 EU/DL (ref 0.2–1)
WBC # BLD AUTO: 5.1 10E9/L (ref 4–11)
WBC #/AREA URNS AUTO: ABNORMAL /HPF

## 2021-06-02 PROCEDURE — 87086 URINE CULTURE/COLONY COUNT: CPT | Performed by: FAMILY MEDICINE

## 2021-06-02 PROCEDURE — 80061 LIPID PANEL: CPT | Performed by: FAMILY MEDICINE

## 2021-06-02 PROCEDURE — 81001 URINALYSIS AUTO W/SCOPE: CPT | Performed by: FAMILY MEDICINE

## 2021-06-02 PROCEDURE — 96127 BRIEF EMOTIONAL/BEHAV ASSMT: CPT | Performed by: FAMILY MEDICINE

## 2021-06-02 PROCEDURE — 36415 COLL VENOUS BLD VENIPUNCTURE: CPT | Performed by: FAMILY MEDICINE

## 2021-06-02 PROCEDURE — 87624 HPV HI-RISK TYP POOLED RSLT: CPT | Performed by: FAMILY MEDICINE

## 2021-06-02 PROCEDURE — 99396 PREV VISIT EST AGE 40-64: CPT | Performed by: FAMILY MEDICINE

## 2021-06-02 PROCEDURE — 80050 GENERAL HEALTH PANEL: CPT | Performed by: FAMILY MEDICINE

## 2021-06-02 PROCEDURE — 99214 OFFICE O/P EST MOD 30 MIN: CPT | Mod: 25 | Performed by: FAMILY MEDICINE

## 2021-06-02 PROCEDURE — G0145 SCR C/V CYTO,THINLAYER,RESCR: HCPCS | Performed by: FAMILY MEDICINE

## 2021-06-02 RX ORDER — OXYBUTYNIN CHLORIDE 5 MG/1
5-15 TABLET, EXTENDED RELEASE ORAL DAILY
Qty: 270 TABLET | Refills: 3 | Status: SHIPPED | OUTPATIENT
Start: 2021-06-02 | End: 2021-10-28

## 2021-06-02 ASSESSMENT — ANXIETY QUESTIONNAIRES
2. NOT BEING ABLE TO STOP OR CONTROL WORRYING: NOT AT ALL
1. FEELING NERVOUS, ANXIOUS, OR ON EDGE: NOT AT ALL
7. FEELING AFRAID AS IF SOMETHING AWFUL MIGHT HAPPEN: NOT AT ALL
6. BECOMING EASILY ANNOYED OR IRRITABLE: NOT AT ALL
5. BEING SO RESTLESS THAT IT IS HARD TO SIT STILL: NOT AT ALL
GAD7 TOTAL SCORE: 0
IF YOU CHECKED OFF ANY PROBLEMS ON THIS QUESTIONNAIRE, HOW DIFFICULT HAVE THESE PROBLEMS MADE IT FOR YOU TO DO YOUR WORK, TAKE CARE OF THINGS AT HOME, OR GET ALONG WITH OTHER PEOPLE: NOT DIFFICULT AT ALL
3. WORRYING TOO MUCH ABOUT DIFFERENT THINGS: NOT AT ALL

## 2021-06-02 ASSESSMENT — PATIENT HEALTH QUESTIONNAIRE - PHQ9
SUM OF ALL RESPONSES TO PHQ QUESTIONS 1-9: 4
5. POOR APPETITE OR OVEREATING: NOT AT ALL

## 2021-06-02 ASSESSMENT — MIFFLIN-ST. JEOR: SCORE: 1249.42

## 2021-06-02 NOTE — PROGRESS NOTES
SUBJECTIVE:   CC: Inessa Castaneda is an 66 year old woman who presents for preventive health visit and the following other medical problems:      1. Routine history and physical examination of adult    2. Routine general medical examination at a health care facility    3. Depression, major, recurrent, in partial remission (H)    4. Heterozygous MTHFR mutation C677T (H)    5. Visit for screening mammogram    6. Asymptomatic menopausal state    7. Screening for malignant neoplasm of cervix    8. Screen for colon cancer    9. Adenomatous polyp of colon, unspecified part of colon    10. Hyperlipidemia LDL goal <130    11. 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 Urge incontinence of urine    13. Urinary urgency    14. Urgency of urination    15. Enlarged thyroid gland- very mildly enlarged with mildl nodularity  and  firmness     16. Multiple pigmented nevi- diffuse - with sun-damaged skin     17. First degree uterine prolapse       Is fasting today.     Urge of urination with incontinence - 5mg oxybutynin not working well.    Is open to increasing dosage. Is s/p urethral sling procedure. No longer getting stress incontinence, but getting full urge incontinence that is interfering with her lifestyle and limiting her social life.  - Discussed:   For bladder urgency and bladder irritation which can contribute to daytime and/or night-time urgency, accidents or incontinence for children and adults :   Avoid or better yet eliminate citrus juices (orange juice, grapefruit juice, lemonade or limeade), citric acid in things as preservatives,  and /or or vinegars ( acidic substances - even those in salad dressing), all caffeine, even decaf coffee, and teas; alcohol, and artificial sweeteners, red and blue food dyes, sports drinks, spicy foods, chocolate, tomato products, excessive dairy, and carbonated beverages ( even sparkling segovia).     Even 1 serving of any of the above can  irritate/negatively affect the bladder for 3 days.     If you eliminate all the above and are still having problems, please contact our office.         Patient has been advised of split billing requirements and indicates understanding: Yes  Healthy Habits:     Getting at least 3 servings of Calcium per day:  Yes    Bi-annual eye exam:  Yes    Dental care twice a year:  Yes    Sleep apnea or symptoms of sleep apnea:  None    Diet:  Regular (no restrictions)    Frequency of exercise:  6-7 days/week    Duration of exercise:  30-45 minutes    Taking medications regularly:  Yes    Medication side effects:  None    PHQ-2 Total Score: 0    Additional concerns today:  No          Hyperlipidemia Follow-Up      Are you regularly taking any medication or supplement to lower your cholesterol?   Yes- simvistatin    Are you having muscle aches or other side effects that you think could be caused by your cholesterol lowering medication?  No     Recent Labs   Lab Test 06/02/21  1651 09/09/19  1504   CHOL 231* 178   HDL 82 67   * 95   TRIG 91 82          Depression Followup    How are you doing with your depression since your last visit? Improved     Are you having other symptoms that might be associated with depression? No    Have you had a significant life event?  No     Are you feeling anxious or having panic attacks?   No    Do you have any concerns with your use of alcohol or other drugs? No    Social History     Tobacco Use     Smoking status: Never Smoker     Smokeless tobacco: Never Used   Substance Use Topics     Alcohol use: Yes     Alcohol/week: 0.0 standard drinks     Comment: 2 times/month  - 1-2 glasses of wine      Drug use: No     Comment: no herbal meds either      PHQ 12/3/2019 7/13/2020 6/2/2021   PHQ-9 Total Score 19 5 4   Q9: Thoughts of better off dead/self-harm past 2 weeks Several days Not at all Not at all     MIGEL-7 SCORE 12/3/2019 7/13/2020 6/2/2021   Total Score 11 4 0     Last PHQ-9 6/2/2021   1.   Little interest or pleasure in doing things 0   2.  Feeling down, depressed, or hopeless 0   3.  Trouble falling or staying asleep, or sleeping too much 1   4.  Feeling tired or having little energy 0   5.  Poor appetite or overeating 3   6.  Feeling bad about yourself 0   7.  Trouble concentrating 0   8.  Moving slowly or restless 0   Q9: Thoughts of better off dead/self-harm past 2 weeks 0   PHQ-9 Total Score 4   Difficulty at work, home, or with people Somewhat difficult     MIGEL-7  6/2/2021   1. Feeling nervous, anxious, or on edge 0   2. Not being able to stop or control worrying 0   3. Worrying too much about different things 0   4. Trouble relaxing 0   5. Being so restless that it is hard to sit still 0   6. Becoming easily annoyed or irritable 0   7. Feeling afraid, as if something awful might happen 0   MIGEL-7 Total Score 0   If you checked any problems, how difficult have they made it for you to do your work, take care of things at home, or get along with other people? Not difficult at all       Suicide Assessment Five-step Evaluation and Treatment (SAFE-T)      Today's PHQ-2 Score:   PHQ-2 ( 1999 [employer]) 6/2/2021   Q1: Little interest or pleasure in doing things 0   Q2: Feeling down, depressed or hopeless 0   PHQ-2 Score 0   Q1: Little interest or pleasure in doing things Not at all   Q2: Feeling down, depressed or hopeless Not at all   PHQ-2 Score 0       Abuse: Current or Past (Physical, Sexual or Emotional) - No  Do you feel safe in your environment? Yes        Social History     Tobacco Use     Smoking status: Never Smoker     Smokeless tobacco: Never Used   Substance Use Topics     Alcohol use: Yes     Alcohol/week: 0.0 standard drinks     Comment: 2 times/month  - 1-2 glasses of wine          Alcohol Use 6/2/2021   Prescreen: >3 drinks/day or >7 drinks/week? No   Prescreen: >3 drinks/day or >7 drinks/week? -       Reviewed orders with patient.  Reviewed health maintenance and updated orders  accordingly - Yes  Lab work is in process  Labs reviewed in EPIC  BP Readings from Last 3 Encounters:   06/02/21 120/80   03/11/20 126/78   09/09/19 122/72    Wt Readings from Last 3 Encounters:   06/02/21 66.7 kg (147 lb)   03/11/20 81.6 kg (180 lb)   09/09/19 80.3 kg (177 lb)                  Patient Active Problem List   Diagnosis     Depression, major, recurrent, in partial remission (H)     Hyperlipidemia LDL goal <130     Adenomatous colon polyp-at age 52, then normal colonoscopy at age 55 and then at age 60- needs repeat colonoscopy 1/2021      Urgency of urination     Genital herpes simplex, unspecified site     Cystocele     Family history of thyroid disorder- mother and sister     Non morbid obesity due to excess calories     Asymptomatic menopausal state     Urge incontinence of urine     Family history of osteoporosis- mother with signif. 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     benign     BUNIONECTOMY Left 1/21/2008    Dr. Alberto Laws - left foot medial and lateral with left 2nd toe surgery      SLING BLADDER SUSPENSION WITH FASCIA SULAIMAN  1/21/2008    - Dr. Emanuel Paz - had DVT afterward      XR ANKLE SURGERY NIXON LEFT  1/21/2008    Dr. Alberto Laws -arthroscopy ankle s/p fall - not fused        Social History     Tobacco Use     Smoking status: Never Smoker     Smokeless tobacco: Never Used   Substance Use Topics     Alcohol use: Yes      Alcohol/week: 0.0 standard drinks     Comment: 2 times/month  - 1-2 glasses of wine      Family History   Problem Relation Age of Onset     Cerebrovascular Disease Mother 88        tiny strokes - TIA -  at age 90     Hypertension Mother      Alzheimer Disease Father 81         age 89     Heart Disease Father 63        s/p MI - first at age 63 , then s/p 4 vessel CABG     Mental Illness Sister         bipolar disorder -with heroin addiction - on methadone - no contact for years      Dementia Sister      Mental Illness Sister         bipolar - 2 with hoarding tendencies, psych haluciantions,       Mental Illness Brother          after fall while drunk - alcoholic      Vascular Disease Brother         dissecting thoracic aortic aneurysm      Hepatitis Brother         Hep C - treated     Mental Illness Sister         hoarding tendencies     Substance Abuse Sister         Opiates      Attention Deficit Disorder Daughter      Substance Abuse Daughter         adderall that was not prescribed         Current Outpatient Medications   Medication Sig Dispense Refill     calcium carbonate-vitamin D (CALCIUM 600+D) 600-200 MG-UNIT TABS Take 1 tablet by mouth       citalopram (CELEXA) 20 MG tablet TAKE 1 TABLET BY MOUTH  DAILY 90 tablet 3     COMPOUNDED NON-CONTROLLED SUBSTANCE (CMPD RX) - PHARMACY TO MIX COMPOUNDED MEDICATION Estriol 1mg/g . Directions place small amount on finger tip and rub around  Vaginal  and urethral opening 3 x per week M,W.F 1 Tube 6     folic acid (FOLVITE) 1 MG tablet Take 1 tablet (1 mg) by mouth daily       omega 3 1000 MG CAPS Take 2 g by mouth daily 180 capsule 1     oxybutynin ER (DITROPAN-XL) 5 MG 24 hr tablet Take 1-3 tablets (5-15 mg) by mouth daily For bladder control 270 tablet 3     simvastatin (ZOCOR) 40 MG tablet Take 1 tablet (40 mg) by mouth At Bedtime 90 tablet 0     traZODone (DESYREL) 100 MG tablet TAKE 1 TABLET BY MOUTH AT  NIGHT AS NEEDED FOR SLEEP 30 tablet 11      valACYclovir (VALTREX) 500 MG tablet Take 1 tablet (500 mg) by mouth 2 times daily 6 tablet 0     Allergies   Allergen Reactions     Sulfa Drugs      rash     Recent Labs   Lab Test 06/02/21  1651 09/09/19  1504 09/11/18  0936 03/01/18  1042 03/01/18  1042 01/09/17  1127   A1C  --   --   --   --  5.3 5.3   * 95 72  --  107* 98   HDL 82 67 48*  --  60 60   TRIG 91 82 293*  --  106 227*   ALT 25 19 22   < > 17 20   CR 0.90 0.83  --    < > 0.78 0.73   GFRESTIMATED 67 76  --    < > 75 81   GFRESTBLACK 78 88  --    < > >90 >90  African American GFR Calc     POTASSIUM 4.2 3.9  --    < > 4.4 4.1   TSH 1.50 1.42  --    < > 2.18 1.89    < > = values in this interval not displayed.        Breast Cancer Screening:    FSH-7:   Breast CA Risk Assessment (FHS-7) 6/2/2021   Did any of your first-degree relatives have breast or ovarian cancer? Yes   Did any of your relatives have bilateral breast cancer? Unknown   Did any man in your family have breast cancer? Unknown   Did any woman in your family have breast and ovarian cancer? No   Did any woman in your family have breast cancer before age 50 y? No   Do you have 2 or more relatives with breast and/or ovarian cancer? No   Do you have 2 or more relatives with breast and/or bowel cancer? No     click delete button to remove this line now  Mammogram Screening: Recommended annual mammography  Pertinent mammograms are reviewed under the imaging tab.    History of abnormal Pap smear: NO - age 30- 65 PAP every 3 years recommended  PAP / HPV Latest Ref Rng & Units 6/2/2021 3/1/2018 11/17/2015   PAP - NIL NIL NIL   HPV 16 DNA NEG:Negative Negative Negative -   HPV 18 DNA NEG:Negative Negative Negative -   OTHER HR HPV NEG:Negative Negative Negative -     Reviewed and updated as needed this visit by clinical staff  Tobacco  Allergies  Meds  Problems  Med Hx  Surg Hx  Fam Hx          Reviewed and updated as needed this visit by Provider  Tobacco  Allergies  Meds  Problems   Med Hx  Surg Hx  Fam Hx         Past Medical History:   Diagnosis Date     Adenomatous colon polyp at age 52     normal colonoscopy at age 55 and then at age 60     Cystocele     midline      Depression, major, recurrent, in partial remission (H) dx'd in her late 30's     varied with hormones, too. has been on 4 different medications      DVT (deep venous thrombosis) (H)     s/p bladder sling surgery /repair , no further work up done     History of thrombophlebitis      Hyperlipidemia LDL goal <130      Spider veins      STD (sexually transmitted disease)     Herpes     Urinary urgency       Past Surgical History:   Procedure Laterality Date     BLADDER SURGERY      Bladder Sling     BREAST BIOPSY, RT/LT Right ?     benign     BUNIONECTOMY Left 2008    Dr. Alberto Laws - left foot medial and lateral with left 2nd toe surgery      SLING BLADDER SUSPENSION WITH FASCIA SULAIMAN  2008    - Dr. Emanuel Paz - had DVT afterward      XR ANKLE SURGERY NIXON LEFT  2008    Dr. Alberto Laws -arthroscopy ankle s/p fall - not fused      OB History    Para Term  AB Living   3 3 3 0 0 3   SAB TAB Ectopic Multiple Live Births   0 0 0 0 0      # Outcome Date GA Lbr [place]/2nd Weight Sex Delivery Anes PTL Lv   3 Term            2 Term            1 Term               Obstetric Comments    x 3 = no complications except with varicose veins in the 3rd pregnancy and some low blood pressure after delivery - had to wear compression hose - wasn't on blood thinners        Review of Systems  CONSTITUTIONAL: NEGATIVE for fever, chills, change in weight  INTEGUMENTARY/SKIN: NEGATIVE for worrisome rashes, moles or lesions  EYES: NEGATIVE for vision changes or irritation  ENT: NEGATIVE for ear, mouth and throat problems  RESP: NEGATIVE for significant cough or SOB  BREAST: NEGATIVE for masses, tenderness or discharge  CV: NEGATIVE for chest pain, palpitations or peripheral edema  GI: NEGATIVE for  "nausea, abdominal pain, heartburn, or change in bowel habits  : NEGATIVE for unusual urinary or vaginal symptoms. No vaginal bleeding.  MUSCULOSKELETAL: NEGATIVE for significant arthralgias or myalgia  NEURO: NEGATIVE for weakness, dizziness or paresthesias  ENDOCRINE: NEGATIVE for temperature intolerance, skin/hair changes  HEME/ALLERGY/IMMUNE: NEGATIVE for bleeding problems  PSYCHIATRIC: NEGATIVE for changes in mood or affect      OBJECTIVE:   /80   Pulse 65   Temp 97.9  F (36.6  C)   Resp 16   Ht 1.702 m (5' 7\")   Wt 66.7 kg (147 lb)   SpO2 100%   BMI 23.02 kg/m    Physical Exam:   GENERAL APPEARANCE: healthy, alert and no distress  EYES: Eyes grossly normal to inspection, PERRL and conjunctivae and sclerae normal  HENT: ear canals and TM's normal, nose and mouth without ulcers or lesions, oropharynx clear and oral mucous membranes moist  NECK: no adenopathy, no asymmetry, masses, or scars and thyroid normal to palpation  RESP: lungs clear to auscultation - no rales, rhonchi or wheezes  BREAST: normal without masses, tenderness or nipple discharge and no palpable axillary masses or adenopathy  CV: regular rate and rhythm, normal S1 S2, no S3 or S4, no murmur, click or rub, no peripheral edema and peripheral pulses strong  ABDOMEN: soft, nontender, no hepatosplenomegaly, no masses and bowel sounds normal   (female): normal female external genitalia, normal urethral meatus, vaginal mucosal atrophy noted, normal cervix, adnexae, and uterus without masses or abnormal discharge, but there is a First degree uterine prolapse noted today.   MS: no musculoskeletal defects are noted and gait is age appropriate without ataxia  SKIN: no suspicious lesions or rashes, but has Many  pigmented nevi- diffuse - with sun-damaged skin   NEURO: Normal strength and tone, sensory exam grossly normal, mentation intact and speech normal  PSYCH: mentation appears normal and affect normal/bright    Diagnostic Test " Results:  Labs reviewed in Epic    ASSESSMENT/PLAN:       ICD-10-CM    1. Routine history and physical examination of adult  Z00.00 TSH with free T4 reflex     Lipid panel reflex to direct LDL Fasting     Comprehensive metabolic panel (BMP + Alb, Alk Phos, ALT, AST, Total. Bili, TP)     CBC with platelets and differential     CANCELED: ALT     CANCELED: AST   2. Routine general medical examination at a health care facility  Z00.00    3. Depression, major, recurrent, in partial remission (H)  F33.41 REVIEW OF HEALTH MAINTENANCE PROTOCOL ORDERS     OFFICE/OUTPT VISIT,EST,LEVL IV   4. Heterozygous MTHFR mutation C677T (H)  E72.12 CBC with platelets and differential     REVIEW OF HEALTH MAINTENANCE PROTOCOL ORDERS     OFFICE/OUTPT VISIT,EST,LEVL IV   5. Visit for screening mammogram  Z12.31 MA Screen Bilateral w/Mikey   6. Asymptomatic menopausal state  Z78.0 DEXA HIP/PELVIS/SPINE - Future   7. Screening for malignant neoplasm of cervix  Z12.4 Pap imaged thin layer screen with HPV - recommended age 30 - 65 years (select HPV order below)     HPV High Risk Types DNA Cervical   8. Screen for colon cancer  Z12.11 GASTROENTEROLOGY ADULT REF PROCEDURE ONLY   9. Adenomatous polyp of colon, unspecified part of colon  D12.6 OFFICE/OUTPT VISIT,EST,LEVL IV   10. Hyperlipidemia LDL goal <130  E78.5 Lipid panel reflex to direct LDL Fasting     Comprehensive metabolic panel (BMP + Alb, Alk Phos, ALT, AST, Total. Bili, TP)     OFFICE/OUTPT VISIT,EST,LEVL IV   11. 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 Urge incontinence of urine  N39.41 UA with Microscopic     Urine Culture Aerobic Bacterial     Adult Urology Referral     OFFICE/OUTPT VISIT,EST,LEVL IV   13. Urinary urgency  R39.15 UA with Microscopic     Urine Culture Aerobic Bacterial     PHYSICAL THERAPY REFERRAL     Adult Urology Referral     OFFICE/OUTPT VISIT,EST,LEVL IV   14.  "Urgency of urination  R39.15 oxybutynin ER (DITROPAN-XL) 5 MG 24 hr tablet     PHYSICAL THERAPY REFERRAL     Adult Urology Referral     OFFICE/OUTPT VISIT,EST,LEVL IV   15. Enlarged thyroid gland- very mildly enlarged with mildl nodularity  and  firmness   E04.9  Thyroid     OFFICE/OUTPT VISIT,EST,LEVL IV   16. Multiple pigmented nevi- diffuse - with sun-damaged skin   D22.9 SKIN CARE REFERRAL     OFFICE/OUTPT VISIT,EST,LEVL IV   17. First degree uterine prolapse  N81.2 Adult Urology Referral     OFFICE/OUTPT VISIT,EST,LEVL IV       Patient has been advised of split billing requirements and indicates understanding: Yes  COUNSELING:  Reviewed preventive health counseling, as reflected in patient instructions    Estimated body mass index is 23.02 kg/m  as calculated from the following:    Height as of this encounter: 1.702 m (5' 7\").    Weight as of this encounter: 66.7 kg (147 lb).        She reports that she has never smoked. She has never used smokeless tobacco.    Pt will when she needs further refills [facility].     Return in about 6 months (around 12/2/2021) for cholesterol recheck, depression/anxiety follow up, with Dr. Moore, in person.   Counseling Resources:  ATP IV Guidelines  Pooled Cohorts Equation Calculator  Breast Cancer Risk Calculator  BRCA-Related Cancer Risk Assessment: FHS-7 Tool  FRAX Risk Assessment  ICSI Preventive Guidelines  Dietary Guidelines for Americans, 2010  USDA's MyPlate  ASA Prophylaxis  Lung CA Screening        Gabriela Moore MD  [facility]  "

## 2021-06-02 NOTE — PATIENT INSTRUCTIONS
Preventive Health Recommendations  Female Ages 50 - 64    Yearly exam: See your health care provider every year in order to  o Review health changes.   o Discuss preventive care.    o Review your medicines if your doctor has prescribed any.      Get a Pap test every three years (unless you have an abnormal result and your provider advises testing more often).    If you get Pap tests with HPV test, you only need to test every 5 years, unless you have an abnormal result.     You do not need a Pap test if your uterus was removed (hysterectomy) and you have not had cancer.    You should be tested each year for STDs (sexually transmitted diseases) if you're at risk.     Have a mammogram every 1 to 2 years.    Have a colonoscopy at age 50, or have a yearly FIT test (stool test). These exams screen for colon cancer.      Have a cholesterol test every 5 years, or more often if advised.    Have a diabetes test (fasting glucose) every three years. If you are at risk for diabetes, you should have this test more often.     If you are at risk for osteoporosis (brittle bone disease), think about having a bone density scan (DEXA).    Shots: Get a flu shot each year. Get a tetanus shot every 10 years.    Nutrition:     Eat at least 5 servings of fruits and vegetables each day.    Eat whole-grain bread, whole-wheat pasta and brown rice instead of white grains and rice.    Get adequate Calcium and Vitamin D.     Lifestyle    Exercise at least 150 minutes a week (30 minutes a day, 5 days a week). This will help you control your weight and prevent disease.    Limit alcohol to one drink per day.    No smoking.     Wear sunscreen to prevent skin cancer.     See your dentist every six months for an exam and cleaning.    See your eye doctor every 1 to 2 years.        For bladder urgency and bladder irritation which can contribute to daytime and/or night-time urgency, accidents or incontinence for children and adults :   Avoid or better  yet eliminate citrus juices (orange juice, grapefruit juice, lemonade or limeade), citric acid in things as preservatives,  and /or or vinegars ( acidic substances - even those in salad dressing), all caffeine, even decaf coffee, and teas; alcohol, and artificial sweeteners, red and blue food dyes, sports drinks, spicy foods, chocolate, tomato products, excessive dairy, and carbonated beverages ( even sparkling segovia).     Even 1 serving of any of the above can irritate/negatively affect the bladder for 3 days.     If you eliminate all the above and are still having problems, please contact our office.            Thank you so much or choosing [facility]  for your Health Care. It was a pleasure seeing you at your visit today! Please contact us with any questions or concerns you may have.                   Gabriela Moore MD                              To reach your [facility] care team after hours call:   [phone]    PLEASE NOTE OUR HOURS HAVE CHANGED secondary to COVID-19 coronavirus pandemic, as we are trying to minimize patient exposure to the virus,  which is now widespread in the [facility].  These hours may change with very little notice.  We apologize for any inconvenience.       Our current clinic hours are:          Monday- Thursday   7:00am - 6:00pm  in person.      Friday  7:00am- 5:00pm                       Saturday and Sunday : Closed to in person and virtual visits        We have telephone and virtual visit times available between    7:00am - 6pm on Monday-Friday as well.                                                Phone:  [phone]      Our pharmacy hours: Monday through Friday 9:00am to 5:00pm                        Saturday - 9:00 am to 12 noon       Sunday : Closed.              Phone:  [phone]              ###  Please note: at this time we are not accepting any walk-in visits. ###      There is also information available at  our web site:  [email or web address]    If your provider ordered any lab tests and you do not receive the results within 10 business days, please call the clinic.    If you need a medication refill please contact your pharmacy.  Please allow 2 business days for your refill to be completed.    Our clinic offers telephone visits and e visits.  Please ask one of your team members to explain more.      Use [employer] (secure email communication and access to your chart) to send your primary care provider a message or make an appointment. Ask someone on your Team how to sign up for [employer].               Patient Education     Treating Incontinence in Women with Medicine    Urinary incontinence is the leaking of urine from the bladder. In some cases, medicine can reduce or stop the leaking. It's mainly given for urge incontinence. Your healthcare provider will talk with you about your options. Make sure to ask what side effects to expect.   Below are some types of medicines that may help with urge incontinence.   Types of medicine    Anticholinergics or beta-3 adrenergics.  These may increase how much urine the bladder can hold. They may also help relax bladder muscles.    Estrogen. This may help improve muscle tone in the urethra and bladder.    Antibiotics. These are used to treat urinary tract infections.    Botulinum toxin. Injection of botulinum toxin into the bladder muscle is an option when other medicines are not effective.    Tips for taking medicine    Take your medicine on time and as your healthcare provider tell you to.    Tell your healthcare provider if you have any side effects, your dosage may be adjusted if needed.    Be patient. It may take time to find the right dose for you.    Keep a list of the medicines you take. Show it to your healthcare provider and pharmacist before you buy over-the-counter medicines.    [employer] last reviewed this educational content on 9/1/2019 2000-2021 [employer].  All rights reserved. This information is not intended as a substitute for professional medical care. Always follow your healthcare professional's instructions.           Patient Education     Treating Incontinence in Women: Nonsurgical Methods     The best treatment for you will depend on the type of incontinence you have. Your symptoms, age, and any underlying problems that are found also affect your treatment. Some types of incontinence may over time require surgery. But nonsurgical treatments may be effective in many cases. Nonsurgical treatments include lifestyle changes, muscle-strengthening exercises, and medicines.   Nonsurgical treatments  Treatment for stress urinary incontinence includes:     Bladder training    Lifestyle changes such as weight loss and increased activity if incontinence is due to being overweight    Medicines, if bladder training has not helped    Pelvic floor muscle exercises  Lifestyle changes    Losing weight. Excess weight puts extra pressure on the pelvic floor muscles. Exercising and eating right can help you lose weight. This helps other treatments work better.    Making certain diet changes. Some foods may make you need to urinate more, so it may be good not to have them. These include caffeinated drinks and alcohol. Ask your healthcare provider if these or other diet changes might be helpful.    Quitting smoking. Smoking can lead to a long-term (chronic) cough that strains pelvic floor muscles. Smoking may also damage the bladder and urethra.    Pelvic floor muscle exercises  There are exercises you can do to help strengthen your pelvic floor muscles. The pelvic floor muscles act as a sling to help hold the bladder and urethra in place. These muscles also help keep the urethra closed. Weak pelvic floor muscles may allow urine to leak. To strengthen the pelvic floor muscles, do the exercises daily. In a few months, the muscles will be stronger and tighter. This can help prevent  urine leakage.   [email or web address] last reviewed this educational content on 9/1/2019 2000-2021 [employer]. All rights reserved. This information is not intended as a substitute for professional medical care. Always follow your healthcare professional's instructions.           Patient Education     Treating Incontinence in Women: Retraining and Self-Catheterization     You and your healthcare provider can discuss other ways to manage your incontinence. These may be used with other treatments, or used alone. Your provider may teach you ways to train your bladder. Keeping a bladder diary can be helpful. Other helpful changes include reducing drinks that increase urination, such as those with alcohol or caffeine. Limiting drinks in the evening may also help.   Timed voiding  Timed voiding means urinating on a set schedule. This empties the bladder and helps prevent accidents. Visit the bathroom at the scheduled time. Don t wait until you have the urge to urinate. Your healthcare provider can suggest how often you should urinate.   Bladder retraining  If you have urge incontinence, you may be used to going to the bathroom very often. To help retrain your bladder, your healthcare provider may suggest using Kegel exercises. Each time you feel the urge to urinate, try to stop the feeling by geovanni your pelvic floor muscles. To learn which muscles to contract for Kegel exercises, you can try to stop the stream of urine by geovanni the pelvic floor muscles. These exercises help strengthen and control your bladder muscles. But Kegel exercises should be done when you don't have to urinate. It's not advised to do them each time you urinate. Your provider can give you a goal to work up to. This treatment should never be used in children.   Self-catheterization  Catheterization uses a thin tube (catheter) to drain urine from the bladder. The catheter is inserted through the urethra into the bladder. You may be  asked to do self-catheterization. Regularly draining your bladder of urine can help control overflow incontinence. The procedure is painless and easy to learn. If this treatment will help you, your healthcare provider will teach you how to do it.   [employer] last reviewed this educational content on 12/1/2019 2000-2021 [employer]. All rights reserved. This information is not intended as a substitute for professional medical care. Always follow your healthcare professional's instructions.           Patient Education     Treating Incontinence in Women: Special Therapies      During biofeedback, sensors send signals from your pelvic floor muscles to a computer screen.   Your healthcare provider will discuss your options for treating your urinary incontinence. These depend on the cause of your problem and any other health issues you have. Often behavioral changes are tried first, followed by various medicines. If these methods are unsuccessful, one or more of the therapies described below may be part of your treatment plan.   Biofeedback  This method is taught by a nurse or physical therapist. During the therapy, a small sensor is placed in your vagina or rectum. Another sensor is placed on your stomach. Other types of sensors are also available. These sensors read signals from the pelvic floor muscles. When you contract or relax your muscles, these signals are shown as images on a computer screen. Using the images, you can learn to relax or contract certain muscles. This can help you strengthen and better control these muscles. And it can help you learn pelvic floor muscle exercises.   Electrical stimulation  This is a painless therapy that uses a tiny amount of electric current. It helps strengthen very weak or damaged pelvic floor muscles. The electric current is sent through the muscles of the pelvic floor and bladder. This causes the muscles to contract. In time, this helps make the muscles  stronger.   Stimulator implants  This method is used to treat urge incontinence. A small device is implanted under the skin near the upper buttocks. This device gives off mild electrical signals. These block extra signals that are being sent to the bladder muscle. This helps the bladder work more normally.   [employer] last reviewed this educational content on 6/1/2019 2000-2021 [employer]. All rights reserved. This information is not intended as a substitute for professional medical care. Always follow your healthcare professional's instructions.           Patient Education     Pelvic Organ Prolapse  Pelvic organ prolapse is when 1 or more organs inside the pelvis slip from their normal places. The pelvis is found between the waist and thighs. Normally, muscles and tissues in the pelvic region support the pelvic organs and hold them in place.   What is a normal pelvis?     Cutaway view of pelvis showing the small intestine, bladder, pubic bone, urethra, pelvic floor muscles, uterus, vagina, and rectum.   A. The small intestine  absorbs nutrients from food.   B. The bladder collects and holds urine.   C. The pubic bone  helps protect the pelvic organs.   D. The urethra is the tube that carries urine out of the body.   E. The pelvic floor muscles support organs and other structures in the pelvis.   F. The uterus is where the baby develops when a women is pregnant.   G. The vagina is the canal from the uterus to the outside of the body.   H. The rectum stores stool until a bowel movement occurs.   What causes pelvic organ prolapse?    There are several causes of pelvic organ prolapse including:     Vaginal childbirth    Hereditary (genetic) factors    Connective tissue disorders    Getting older    Constant coughing (such as with bronchitis or smoking)    Heavy lifting    Chronic straining (such as with constipation)    Being overweight  What are the symptoms of pelvic organ prolapse?   The symptoms of  pelvic organ prolapse include:     A feeling of fullness or pressure in your pelvis    A sense that a ball or lump is sticking out from the vagina    Problems passing urine or having a bowel movement    Urine leakage when you cough or use stairs. (But this can happen even without prolapse.)     Pain or pressure in your low back    Pain when having sex  [employer] last reviewed this educational content on 5/1/2020 2000-2021 [employer]. All rights reserved. This information is not intended as a substitute for professional medical care. Always follow your healthcare professional's instructions.           Patient Education     Pelvic Organ Prolapse: Nonsurgical Treatment  If your pelvic organ prolapse is mild or doesn t bother you much, or if you have medical conditions that make surgery too risky, nonsurgical treatment may be a good choice. A device (pessary) to wear in your vagina can help ease your symptoms. You may also be given certain exercises (Kegels) to do. And you may need to make some lifestyle changes.   Wearing a pessary  A pessary helps support the prolapsed organ or organs. It is specifically fitted by your healthcare provider. A pessary may ease your symptoms, but it can t repair prolapse. The pessary must be removed for cleaning. If you can t do this, you will need to see your healthcare provider regularly. He or she will remove and clean your pessary. If you have questions or concerns about the pessary, be sure to talk with your provider.   Doing Kegels  Kegels are simple exercises that you can do to strengthen the pelvic floor muscles. They may ease your symptoms and prevent further prolapse. To do a Kegel, contract your pelvic floor muscles as if to stop the urine stream. (Do this when you re not urinating.) Ask your healthcare provider how many Kegels to do and how long to hold each one. During your treatment visits, your provider may place a device in your vagina to measure your  Kegel contractions. That way, you can find out if you are doing Kegel exercises correctly.   Improving your health may ease your symptoms or keep your problem from worsening. You may be asked to:     Quit smoking to prevent excessive coughing    Adjust medicines that may cause urine leakage    Limit fluid intake, if incontinence is a problem. This includes limiting drinks that contain caffeine (a diuretic). Emptying your bladder at scheduled times (bladder training) also may be useful if you have incontinence.    Not do any lifting, which puts pressure on pelvic muscles    Exercise and eat well to stay at a healthy weight  [employer] last reviewed this educational content on 5/1/2020 2000-2021 [employer]. All rights reserved. This information is not intended as a substitute for professional medical care. Always follow your healthcare professional's instructions.           Patient Education     Pelvic Organ Prolapse: Surgery for Incontinence  The pelvic organs include the organs of your reproductive system and your urinary tract. The pelvic organs are supported by pelvic floor muscles. Pelvic floor muscles can weaken. This may cause one or more pelvic organs to fall out of place (prolapse). Pelvic organ prolapse can contribute to stress urinary incontinence (JOSE). This is trouble controlling the flow of urine out of the body. You may have surgery to treat JOSE. During this surgery, a prolapse can be fixed. Your surgeon can discuss the details of different surgery procedures with you.  Pelvic organ prolapse: common types  Cystocele. This is when the bladder sags into the vagina. To fix this, the bladder is moved back into its normal position. It's then sewn into place. The tissue between the vagina and bladder may be strengthened for better support to keep the bladder from sagging.  Uterine prolapse. This is when the uterus sags into the vagina. The uterus may fall as far as the opening of the vagina.  To fix this, the uterus is often removed (hysterectomy). Or, the uterus may be sewn back into place.   Rectocele. This is when the rectum bulges into the vagina. An enterocele (much less common) is when the small intestine bulges into the vagina. During JOSE surgery, the bulge in the rectum or small intestine can be fixed. Vaginal vault prolapse. This is when the walls of the vagina fall in on themselves. It can happen if the uterus has been removed. Surgery can be done to lift the vagina and hold it in place.   Risks and possible complications of this surgery    Infection    Bleeding    Blood clots    Risks of anesthesia    Damage to nerves, muscles, or nearby pelvic structures    Prolapse of the pelvic organ or organs happening again    [employer] last reviewed this educational content on 11/1/2019 2000-2021 [employer]. All rights reserved. This information is not intended as a substitute for professional medical care. Always follow your healthcare professional's instructions.           Patient Education     Pelvic Organ Prolapse: Surgery for Uterine Prolapse   Risks of surgery    Infection    Bleeding    Risks of anesthesia    Damage to nerves, muscles, or nearby pelvic structures    Blood clots    Prolapse of the pelvic organ or organs occurring again    The surgical procedure  To fix the prolapse, the uterus is often removed. This is called hysterectomy. Then, the vagina is lifted and supported so it stays in place. This type of surgery can be done through the vagina or abdomen. Stitches are used to attach the vagina to strong tissue in the pelvis. Sometimes a synthetic material or biologic material is used to reinforce the repair. This supports the top part of the vagina. Other procedures may be done to keep the vagina from slipping again. If the uterus is not removed, then the uterus is lifted and supported so it stays in place.      Incision made in vaginal wall      Abdominal incisions   Your  incisions  During surgery, the doctor reaches your pelvic organs through the vagina or the abdomen. If the pelvic organs are reached through the vagina, an incision is made in the wall of the vagina. If the pelvic organs are reached through the abdomen, several small incisions are made in the abdomen to insert tiny laparoscopic tools. Or one larger incision is made in the abdomen. The belly incision can be up and down (vertical) or across (transverse).   [employer] last reviewed this educational content on 6/1/2020 2000-2021 [employer]. All rights reserved. This information is not intended as a substitute for professional medical care. Always follow your healthcare professional's instructions.

## 2021-06-02 NOTE — PROGRESS NOTES
"   SUBJECTIVE:   CC: Inessa Castaneda is an 66 year old woman who presents for preventive health visit.     {Split Bill scripting  The purpose of this visit is to discuss your medical history and prevent health problems before you are sick. You may be responsible for a co-pay, coinsurance, or deductible if your visit today includes services such as checking on a sore throat, having an x-ray or lab test, or treating and evaluating a new or existing condition :510779}  Patient has been advised of split billing requirements and indicates understanding: {Yes and No:633371}  Healthy Habits:    Do you get at least three servings of calcium containing foods daily (dairy, green leafy vegetables, etc.)? { :072065::\"yes\"}    Amount of exercise or daily activities, outside of work: { :737451}    Problems taking medications regularly { :924449::\"No\"}    Medication side effects: { :259906::\"No\"}    Have you had an eye exam in the past two years? { :130977}    Do you see a dentist twice per year? { :453377}    Do you have sleep apnea, excessive snoring or daytime drowsiness?{ :039475}  {Outside tests to abstract? :260007}    {additional problems to add (Optional):251980}    Today's PHQ-2 Score:   PHQ-2 ( 1999 [employer]) 6/2/2021 3/11/2020   Q1: Little interest or pleasure in doing things 0 1   Q2: Feeling down, depressed or hopeless 0 1   PHQ-2 Score 0 2   Q1: Little interest or pleasure in doing things Not at all -   Q2: Feeling down, depressed or hopeless Not at all -   PHQ-2 Score 0 -     {PHQ-2 LOOK IN ASSESSMENTS (Optional) :614402}  Abuse: Current or Past(Physical, Sexual or Emotional)- {YES/NO/NA:648105}  Do you feel safe in your environment? {YES/NO/NA:068788}        Social History     Tobacco Use     Smoking status: Never Smoker     Smokeless tobacco: Never Used   Substance Use Topics     Alcohol use: Yes     Alcohol/week: 0.0 standard drinks     Comment: 2 times/month  - 1-2 glasses of wine      If you drink alcohol do you " "typically have >3 drinks per day or >7 drinks per week? {ETOH :753752}                     Reviewed orders with patient.  Reviewed health maintenance and updated orders accordingly - {Yes/No:330139::\"Yes\"}  {Chronicprobdata (Optional):752114}    FSH-7:   Breast CA Risk Assessment (FHS-7) 6/2/2021   Did any of your first-degree relatives have breast or ovarian cancer? Yes   Did any of your relatives have bilateral breast cancer? Unknown   Did any man in your family have breast cancer? Unknown   Did any woman in your family have breast and ovarian cancer? No   Did any woman in your family have breast cancer before age 50 y? No   Do you have 2 or more relatives with breast and/or ovarian cancer? No   Do you have 2 or more relatives with breast and/or bowel cancer? No     {If any of the questions to the BCRA (FHS-7) are answered yes, consider ordering referral for genetic counseling (Optional) :262899::\"click delete button to remove this line now\"}  {AMB Mammogram Decision Support (Optional) :491681}  Pertinent mammograms are reviewed under the imaging tab.    Pertinent mammograms are reviewed under the imaging tab.  History of abnormal Pap smear: {PAP HX:037346}  PAP / HPV Latest Ref Rng & Units 3/1/2018 11/17/2015   PAP - NIL NIL   HPV 16 DNA NEG:Negative Negative -   HPV 18 DNA NEG:Negative Negative -   OTHER HR HPV NEG:Negative Negative -     Reviewed and updated as needed this visit by clinical staff                 Reviewed and updated as needed this visit by Provider                {HISTORY OPTIONS (Optional):590863}    ROS:  { :370333}    OBJECTIVE:   There were no vitals taken for this visit.  EXAM:  {Exam Choices:118501}    {Diagnostic Test Results (Optional):086231::\"Diagnostic Test Results:\",\"Labs reviewed in Epic\"}    ASSESSMENT/PLAN:   {Diag Picklist:928004}    Patient has been advised of split billing requirements and indicates understanding: {YES / NO:882379::\"Yes\"}  COUNSELING:   {FEMALE COUNSELING " "MESSAGES:544830::\"Reviewed preventive health counseling, as reflected in patient instructions\"}    Estimated body mass index is 28.02 kg/m  as calculated from the following:    Height as of 3/11/20: 1.707 m (5' 7.2\").    Weight as of 3/11/20: 81.6 kg (180 lb).    {Weight Management Plan (ACO) Complete if BMI is abnormal-  Ages 18-64  BMI >24.9.  Age 65+ with BMI <23 or >30 (Optional):195360}    She reports that she has never smoked. She has never used smokeless tobacco.      Counseling Resources:  ATP IV Guidelines  Pooled Cohorts Equation Calculator  Breast Cancer Risk Calculator  BRCA-Related Cancer Risk Assessment: FHS-7 Tool  FRAX Risk Assessment  ICSI Preventive Guidelines  Dietary Guidelines for Americans, 2010  USDA's MyPlate  ASA Prophylaxis  Lung CA Screening    Gabriela Moore MD  [facility]  "

## 2021-06-03 LAB
ALBUMIN SERPL-MCNC: 3.7 G/DL (ref 3.4–5)
ALP SERPL-CCNC: 51 U/L (ref 40–150)
ALT SERPL W P-5'-P-CCNC: 25 U/L (ref 0–50)
ANION GAP SERPL CALCULATED.3IONS-SCNC: 4 MMOL/L (ref 3–14)
AST SERPL W P-5'-P-CCNC: 16 U/L (ref 0–45)
BILIRUB SERPL-MCNC: 0.6 MG/DL (ref 0.2–1.3)
BUN SERPL-MCNC: 22 MG/DL (ref 7–30)
CALCIUM SERPL-MCNC: 9.1 MG/DL (ref 8.5–10.1)
CHLORIDE SERPL-SCNC: 105 MMOL/L (ref 94–109)
CHOLEST SERPL-MCNC: 231 MG/DL
CO2 SERPL-SCNC: 29 MMOL/L (ref 20–32)
CREAT SERPL-MCNC: 0.9 MG/DL (ref 0.52–1.04)
GFR SERPL CREATININE-BSD FRML MDRD: 67 ML/MIN/{1.73_M2}
GLUCOSE SERPL-MCNC: 77 MG/DL (ref 70–99)
HDLC SERPL-MCNC: 82 MG/DL
LDLC SERPL CALC-MCNC: 131 MG/DL
NONHDLC SERPL-MCNC: 149 MG/DL
POTASSIUM SERPL-SCNC: 4.2 MMOL/L (ref 3.4–5.3)
PROT SERPL-MCNC: 7 G/DL (ref 6.8–8.8)
SODIUM SERPL-SCNC: 138 MMOL/L (ref 133–144)
TRIGL SERPL-MCNC: 91 MG/DL
TSH SERPL DL<=0.005 MIU/L-ACNC: 1.5 MU/L (ref 0.4–4)

## 2021-06-03 ASSESSMENT — ANXIETY QUESTIONNAIRES: GAD7 TOTAL SCORE: 0

## 2021-06-04 LAB
BACTERIA SPEC CULT: NORMAL
Lab: NORMAL
SPECIMEN SOURCE: NORMAL

## 2021-06-07 LAB
COPATH REPORT: NORMAL
PAP: NORMAL

## 2021-07-16 ENCOUNTER — HOSPITAL ENCOUNTER (OUTPATIENT)
Facility: CLINIC | Age: 66
End: 2021-07-16
Attending: INTERNAL MEDICINE | Admitting: INTERNAL MEDICINE
Payer: COMMERCIAL

## 2021-07-30 DIAGNOSIS — E78.5 HYPERLIPIDEMIA LDL GOAL <130: ICD-10-CM

## 2021-08-02 RX ORDER — SIMVASTATIN 40 MG
TABLET ORAL
Qty: 30 TABLET | Refills: 11 | Status: SHIPPED | OUTPATIENT
Start: 2021-08-02 | End: 2022-06-07

## 2021-08-04 ENCOUNTER — TRANSFERRED RECORDS (OUTPATIENT)
Dept: HEALTH INFORMATION MANAGEMENT | Facility: CLINIC | Age: 66
End: 2021-08-04

## 2021-08-23 ENCOUNTER — TRANSFERRED RECORDS (OUTPATIENT)
Dept: HEALTH INFORMATION MANAGEMENT | Facility: CLINIC | Age: 66
End: 2021-08-23

## 2021-08-24 NOTE — RESULT ENCOUNTER NOTE
Your mammogram was normal.     Thank you so much for choosing [facility].  Please contact us with any questions that you may have.   We appreciate the opportunity to serve you now and look forward to supporting your healthcare needs for a long time to come!    Most Sincerely,     Gabriela Moore MD

## 2021-10-24 ENCOUNTER — HEALTH MAINTENANCE LETTER (OUTPATIENT)
Age: 66
End: 2021-10-24

## 2021-10-28 ENCOUNTER — TELEPHONE (OUTPATIENT)
Dept: FAMILY MEDICINE | Facility: CLINIC | Age: 66
End: 2021-10-28

## 2021-10-28 NOTE — TELEPHONE ENCOUNTER
Patient calling.    1.  Asking if there is any contraindication for patient in taking the supplement Tumeric.  Patient started a new program called [employer] and the leader recommended the Tumeric supplement--patient cannot remember what the benefits were for taking it.  States was recommended to take 3 tabs daily.  Ok for her to take?    Please advise, thanks.    2.  Patient states she stopped Citalopram, Trazodone, and Oxybutynin x 2 months ago.  This RN removed medications from patient's medication list.

## 2021-10-29 NOTE — TELEPHONE ENCOUNTER
Turmeric is just fine.  I often recommend it in combination with black pepper and bonita as a combined supplements as a natural anti-inflammatory.    I often put the following 2 paragraphs on patient's after visit summary will have inflammatory conditions, such as arthritis, eczema, or other inflammatory conditions, Etc.  Sent these in my chart message to patient.    Look at your inflammatory foods:avoid or eliminate  processed flours, processed sugars, saturated fats.  For people with existing arthritis, sometimes people are also sensitive to  tomatoes, potatoes, eggplant, caffeine containing products, and charred or grilled meats, which can cause increased inflammation and with the grilled meats and caffeine - these things can increase uric acid levels. Sometimes dairy and gluten can increase inflammation for some people as well.        For anti-inflammatory foods: Try to stick with dark green leafy veggies, organic foods, beets, walnuts, green tea- especially matcha green tea, salmon, pomegranates, olive oil, fish oil, dark red tart cherries.   Also supplements with turmeric (look for one with black pepper in it also - can also find in combination with bonita and curcumin - other natural anti-inflammatories)  can help as well.   Can find turmeric supplements in powder, liquid or capsules.  Krishna Milk combines all the above - turmeric, black pepper, bonita, and curcumin) - which you can mix with hot liquid or a nut based milk.

## 2022-02-21 ENCOUNTER — TELEPHONE (OUTPATIENT)
Dept: FAMILY MEDICINE | Facility: CLINIC | Age: 67
End: 2022-02-21
Payer: COMMERCIAL

## 2022-02-21 DIAGNOSIS — A60.00 GENITAL HERPES SIMPLEX, UNSPECIFIED SITE: ICD-10-CM

## 2022-02-21 NOTE — TELEPHONE ENCOUNTER
Patient requesting a refill of the Valacyclovir.  Likes to have a RX on hand.  Had to use the one she had about 2 weeks ago so requesting a new RX.  Can be ent to the [employer] mail service       Mari Campbell

## 2022-03-01 RX ORDER — VALACYCLOVIR HYDROCHLORIDE 500 MG/1
500 TABLET, FILM COATED ORAL 2 TIMES DAILY
Qty: 6 TABLET | Refills: 1 | Status: SHIPPED | OUTPATIENT
Start: 2022-03-01 | End: 2022-11-02

## 2022-05-13 ENCOUNTER — NURSE TRIAGE (OUTPATIENT)
Dept: FAMILY MEDICINE | Facility: CLINIC | Age: 67
End: 2022-05-13
Payer: COMMERCIAL

## 2022-05-13 ENCOUNTER — VIRTUAL VISIT (OUTPATIENT)
Dept: FAMILY MEDICINE | Facility: CLINIC | Age: 67
End: 2022-05-13
Payer: COMMERCIAL

## 2022-05-13 DIAGNOSIS — R51.9 OCCIPITAL PAIN: Primary | ICD-10-CM

## 2022-05-13 DIAGNOSIS — E78.5 HYPERLIPIDEMIA LDL GOAL <130: ICD-10-CM

## 2022-05-13 DIAGNOSIS — Z13.1 SCREENING FOR DIABETES MELLITUS: ICD-10-CM

## 2022-05-13 PROCEDURE — 99214 OFFICE O/P EST MOD 30 MIN: CPT | Mod: GT | Performed by: FAMILY MEDICINE

## 2022-05-13 NOTE — PROGRESS NOTES
Inessa is a 67 year old who is being evaluated via a billable video visit.      How would you like to obtain your AVS? MyChart  If the video visit is dropped, the invitation should be resent by: Text to cell phone: [phone]  Will anyone else be joining your video visit? No    Video Start Time: 2:55 PM    Assessment & Plan     Occipital pain: unclear etiology. Unable to perform any neurological exam. Her symptoms do sound like they may represent occipital neuralgia. Will check metabolic panel, inflammatory markers, thyroid, LIZZETTE. Follow up accordingly but may benefit from neurology consultation.  - Comprehensive metabolic panel (BMP + Alb, Alk Phos, ALT, AST, Total. Bili, TP); Future  - CRP, inflammation; Future  - ESR: Erythrocyte sedimentation rate; Future  - Anti Nuclear Patricia IgG by IFA with Reflex; Future  - TSH with free T4 reflex; Future  - CBC with platelets; Future    Hyperlipidemia LDL goal <130  - Lipid panel reflex to direct LDL Fasting; Future    Screening for diabetes mellitus  - Comprehensive metabolic panel (BMP + Alb, Alk Phos, ALT, AST, Total. Bili, TP); Future    Return in about 1 week (around 5/20/2022) for Lab Work.    [facility]   Inessa is a 67 year old who presents for the following health issues: localized headache.    HPI       Onset:  2 days ago    Description:   Location: occipital area - above and behind the ear   Character: brief zing of pain intermittenyly  Frequency:  Occurs multiple times per da  Duration:  Lasts a few seconds and resolves    Intensity: moderate    Progression of Symptoms:  worsening    Accompanying Signs & Symptoms:  Stiff neck: no  Neck or upper back pain: no  Fever: no  Sinus pressure: no  Nausea or vomiting: no  Dizziness: no  Numbness: no  Weakness: no  Visual changes: no    History:   Head trauma: no  Family history of migraines: no  Previous tests for headaches: no  Neurologist evaluations: no  Able  to do daily activities: YES  Wake with a headaches: no  Do headaches wake you up: no  Daily pain medication use: no  Work/school stressors/changes: no    Precipitating factors:   Does light make it worse: no  Does sound make it worse: no    Alleviating factors:  Does sleep help: no    Therapies Tried and outcome: pressing on the area makes it feel better. Pain doesn't interfere with daily activities.    Review of Systems   Constitutional, HEENT, cardiovascular, pulmonary, gi and gu systems are negative, except as otherwise noted.      Objective           Vitals:  No vitals were obtained today due to virtual visit.    Physical Exam   GENERAL: Healthy, alert and no distress  EYES: Eyes grossly normal to inspection.  No discharge or erythema, or obvious scleral/conjunctival abnormalities.  RESP: No audible wheeze, cough, or visible cyanosis.  No visible retractions or increased work of breathing.    SKIN: Visible skin clear. No significant rash, abnormal pigmentation or lesions.  NEURO: Cranial nerves grossly intact.  Mentation and speech appropriate for age.  PSYCH: Mentation appears normal, affect normal/bright, judgement and insight intact, normal speech and appearance well-groomed.            Video-Visit Details    Type of service:  Video Visit    Video End Time:3:17 PM    Originating Location (pt. Location): Home    Distant Location (provider location):  [facility]     Platform used for Video Visit: Every[employer]

## 2022-05-13 NOTE — TELEPHONE ENCOUNTER
S-(situation): headache for past 2 days    B-(background): no history of headaches in the past.  Started gradually 2 days ago.  Was intermittent ache at first and is becoming more constant.  Stated gets zings of pain and then subsides some.  Pain is mild and doesn't go away entirely. Not having any difficulty with daily activities.     A-(assessment): denied fever, hx of migraines, nausea, vomiting, dizziness, lightheadedness, weakness, numbness, tingling, dysarthria, dysphagia, head injury, stiff neck, eye pain, sore throat, cold symptoms.  Has had this happen for years now, but was always brief and went away completely.     R-(recommendations): protocol recommend to be seen today or tomorrow.  Assisted with scheduling a video visit for today as no in clinic appointments available.    Advised patient to call back with any further symptoms or if worsening. Patient verbalized understanding.       Reason for Disposition    Unexplained headache that is present > 24 hours    Additional Information    Negative: Difficult to awaken or acting confused (e.g., disoriented, slurred speech)    Negative: Weakness of the face, arm or leg on one side of the body and new onset    Negative: Numbness of the face, arm or leg on one side of the body and new onset    Negative: Loss of speech or garbled speech and new onset    Negative: Passed out (i.e., fainted, collapsed and was not responding)    Negative: Sounds like a life-threatening emergency to the triager    Negative: Followed a head injury within last 3 days    Negative: Traumatic Brain Injury (TBI) is suspected    Negative: Sinus pain of forehead and yellow or green nasal discharge    Negative: Pregnant    Negative: Unable to walk without falling    Negative: Stiff neck (can't touch chin to chest)    Negative: Possibility of carbon monoxide exposure    Negative: SEVERE headache, states 'worst headache' of life    Negative: SEVERE headache, sudden-onset (i.e., reaching maximum  "intensity within seconds to 1 hour)    Negative: Severe pain in one eye    Negative: Loss of vision or double vision (Exception: same as prior migraines)    Negative: Patient sounds very sick or weak to the triager    Negative: Fever > 103 F (39.4 C)    Negative: Fever > 100.0 F (37.8 C) and has diabetes mellitus or a weak immune system (e.g., HIV positive, cancer chemotherapy, organ transplant, splenectomy, chronic steroids)    Negative: SEVERE headache (e.g., excruciating) and has had severe headaches before    Negative: SEVERE headache and not relieved by pain meds    Negative: SEVERE headache and vomiting    Negative: SEVERE headache and fever    Negative: New headache and weak immune system (e.g., HIV positive, cancer chemo, splenectomy, organ transplant, chronic steroids)    Negative: Fever present > 3 days (72 hours)    Negative: Patient wants to be seen    Answer Assessment - Initial Assessment Questions  1. LOCATION: \"Where does it hurt?\"       Left side just above ear and back 1-2 inches.   2. ONSET: \"When did the headache start?\" (Minutes, hours or days)       2 days ago  3. PATTERN: \"Does the pain come and go, or has it been constant since it started?\"      Started out intermittent and has been more constant for past 2 days  4. SEVERITY: \"How bad is the pain?\" and \"What does it keep you from doing?\"  (e.g., Scale 1-10; mild, moderate, or severe)    - MILD (1-3): doesn't interfere with normal activities     - MODERATE (4-7): interferes with normal activities or awakens from sleep     - SEVERE (8-10): excruciating pain, unable to do any normal activities         mild  5. RECURRENT SYMPTOM: \"Have you ever had headaches before?\" If so, ask: \"When was the last time?\" and \"What happened that time?\"       Yes, every now and then notices it.  Always in the same spot  6. CAUSE: \"What do you think is causing the headache?\"      unsure  7. MIGRAINE: \"Have you been diagnosed with migraine headaches?\" If so, ask: \"Is " "this headache similar?\"       no  8. HEAD INJURY: \"Has there been any recent injury to the head?\"       no  9. OTHER SYMPTOMS: \"Do you have any other symptoms?\" (fever, stiff neck, eye pain, sore throat, cold symptoms)      no  10. PREGNANCY: \"Is there any chance you are pregnant?\" \"When was your last menstrual period?\"        no    Protocols used: HEADACHE-A-OH      "

## 2022-05-16 ENCOUNTER — LAB (OUTPATIENT)
Dept: LAB | Facility: CLINIC | Age: 67
End: 2022-05-16
Payer: COMMERCIAL

## 2022-05-16 DIAGNOSIS — E78.5 HYPERLIPIDEMIA LDL GOAL <130: ICD-10-CM

## 2022-05-16 DIAGNOSIS — Z13.1 SCREENING FOR DIABETES MELLITUS: ICD-10-CM

## 2022-05-16 DIAGNOSIS — R51.9 OCCIPITAL PAIN: ICD-10-CM

## 2022-05-16 LAB
CRP SERPL-MCNC: 3.4 MG/L (ref 0–8)
ERYTHROCYTE [DISTWIDTH] IN BLOOD BY AUTOMATED COUNT: 13 % (ref 10–15)
ERYTHROCYTE [SEDIMENTATION RATE] IN BLOOD BY WESTERGREN METHOD: 8 MM/HR (ref 0–30)
HCT VFR BLD AUTO: 39.2 % (ref 35–47)
HGB BLD-MCNC: 13.3 G/DL (ref 11.7–15.7)
MCH RBC QN AUTO: 31.7 PG (ref 26.5–33)
MCHC RBC AUTO-ENTMCNC: 33.9 G/DL (ref 31.5–36.5)
MCV RBC AUTO: 93 FL (ref 78–100)
PLATELET # BLD AUTO: 213 10E3/UL (ref 150–450)
RBC # BLD AUTO: 4.2 10E6/UL (ref 3.8–5.2)
WBC # BLD AUTO: 3.6 10E3/UL (ref 4–11)

## 2022-05-16 PROCEDURE — 85652 RBC SED RATE AUTOMATED: CPT

## 2022-05-16 PROCEDURE — 85027 COMPLETE CBC AUTOMATED: CPT

## 2022-05-16 PROCEDURE — 80053 COMPREHEN METABOLIC PANEL: CPT

## 2022-05-16 PROCEDURE — 86140 C-REACTIVE PROTEIN: CPT

## 2022-05-16 PROCEDURE — 84443 ASSAY THYROID STIM HORMONE: CPT

## 2022-05-16 PROCEDURE — 80061 LIPID PANEL: CPT

## 2022-05-16 PROCEDURE — 36415 COLL VENOUS BLD VENIPUNCTURE: CPT

## 2022-05-16 PROCEDURE — 86038 ANTINUCLEAR ANTIBODIES: CPT

## 2022-05-16 PROCEDURE — 86039 ANTINUCLEAR ANTIBODIES (ANA): CPT

## 2022-05-17 LAB
ANA PAT SER IF-IMP: ABNORMAL
ANA SER QL IF: POSITIVE
ANA TITR SER IF: ABNORMAL {TITER}

## 2022-05-18 ENCOUNTER — TELEPHONE (OUTPATIENT)
Dept: FAMILY MEDICINE | Facility: CLINIC | Age: 67
End: 2022-05-18
Payer: COMMERCIAL

## 2022-05-18 DIAGNOSIS — D72.819 LEUKOPENIA, UNSPECIFIED TYPE: Primary | ICD-10-CM

## 2022-05-18 DIAGNOSIS — R51.9 OCCIPITAL PAIN: ICD-10-CM

## 2022-05-18 DIAGNOSIS — R76.8 POSITIVE ANA (ANTINUCLEAR ANTIBODY): ICD-10-CM

## 2022-05-18 LAB
ALBUMIN SERPL-MCNC: 3.9 G/DL (ref 3.4–5)
ALP SERPL-CCNC: 50 U/L (ref 40–150)
ALT SERPL W P-5'-P-CCNC: 17 U/L (ref 0–50)
ANION GAP SERPL CALCULATED.3IONS-SCNC: 11 MMOL/L (ref 3–14)
AST SERPL W P-5'-P-CCNC: 16 U/L (ref 0–45)
BILIRUB SERPL-MCNC: 0.6 MG/DL (ref 0.2–1.3)
BUN SERPL-MCNC: 26 MG/DL (ref 7–30)
CALCIUM SERPL-MCNC: 9.7 MG/DL (ref 8.5–10.1)
CHLORIDE BLD-SCNC: 109 MMOL/L (ref 94–109)
CHOLEST SERPL-MCNC: 195 MG/DL
CO2 SERPL-SCNC: 22 MMOL/L (ref 20–32)
CREAT SERPL-MCNC: 0.91 MG/DL (ref 0.52–1.04)
FASTING STATUS PATIENT QL REPORTED: YES
GFR SERPL CREATININE-BSD FRML MDRD: 70 ML/MIN/1.73M2
GLUCOSE BLD-MCNC: 84 MG/DL (ref 70–99)
HDLC SERPL-MCNC: 67 MG/DL
LDLC SERPL CALC-MCNC: 93 MG/DL
NONHDLC SERPL-MCNC: 128 MG/DL
POTASSIUM BLD-SCNC: 4.2 MMOL/L (ref 3.4–5.3)
PROT SERPL-MCNC: 7.3 G/DL (ref 6.8–8.8)
SODIUM SERPL-SCNC: 142 MMOL/L (ref 133–144)
TRIGL SERPL-MCNC: 176 MG/DL
TSH SERPL DL<=0.005 MIU/L-ACNC: 2.37 MU/L (ref 0.4–4)

## 2022-05-18 NOTE — TELEPHONE ENCOUNTER
Pt calling looking for results. Adv provider is most likely waiting for all labs to be back.     Some labs abnormal so RN did NOT go over labs with pt.     Krista THOMAS RN

## 2022-06-06 NOTE — PATIENT INSTRUCTIONS
[employer] - shari   [employer]       Preventive Health Recommendations    See your health care provider every year to  Review health changes.   Discuss preventive care.    Review your medicines if your doctor has prescribed any.    You no longer need a yearly Pap test unless you've had an abnormal Pap test in the past 10 years. If you have vaginal symptoms, such as bleeding or discharge, be sure to talk with your provider about a Pap test.    Every 1 to 2 years, have a mammogram.  If you are over 69, talk with your health care provider about whether or not you want to continue having screening mammograms.    Every 10 years, have a colonoscopy. Or, have a yearly FIT test (stool test). These exams will check for colon cancer.     Have a cholesterol test every 5 years, or more often if your doctor advises it.     Have a diabetes test (fasting glucose) every three years. If you are at risk for diabetes, you should have this test more often.     At age 65, have a bone density scan (DEXA) to check for osteoporosis (brittle bone disease).    Shots:  Get a flu shot each year.  Get a tetanus shot every 10 years.  Talk to your doctor about your pneumonia vaccines. There are now two you should receive - Pneumovax (PPSV 23) and Prevnar (PCV 13).  Talk to your pharmacist about the shingles vaccine.  Talk to your doctor about the hepatitis B vaccine.    Nutrition:   Eat at least 5 servings of fruits and vegetables each day.    Eat whole-grain bread, whole-wheat pasta and brown rice instead of white grains and rice.    Get adequate about Calcium and Vitamin D.     Lifestyle  Exercise at least 150 minutes a week (30 minutes a day, 5 days a week). This will help you control your weight and prevent disease.    Limit alcohol to one drink per day.    No smoking.     Wear sunscreen to prevent skin cancer.     See your dentist twice a year for an exam and cleaning.    See your eye doctor every 1 to 2 years to  screen for conditions such as glaucoma, macular degeneration, cataracts, etc.    Personalized Prevention Plan  You are due for the preventive services outlined below.  Your care team is available to assist you in scheduling these services.  If you have already completed any of these items, please share that information with your care team to update in your medical record.    Health Maintenance Due   Topic Date Due    Zoster (Shingles) Vaccine (1 of 2) Never done    Osteoporosis Screening  04/02/2020    COVID-19 Vaccine (3 - Booster for Moderna series) 07/22/2021    Depression Assessment  12/02/2021    FALL RISK ASSESSMENT  Never done    ANNUAL REVIEW OF HM ORDERS  06/02/2022    Pneumococcal Vaccine (1 - PCV) 02/17/2022     Preventive Health Recommendations    See your health care provider every year to  Review health changes.   Discuss preventive care.    Review your medicines if your doctor has prescribed any.    You no longer need a yearly Pap test unless you've had an abnormal Pap test in the past 10 years. If you have vaginal symptoms, such as bleeding or discharge, be sure to talk with your provider about a Pap test.    Every 1 to 2 years, have a mammogram.  If you are over 69, talk with your health care provider about whether or not you want to continue having screening mammograms.    Every 10 years, have a colonoscopy. Or, have a yearly FIT test (stool test). These exams will check for colon cancer.     Have a cholesterol test every 5 years, or more often if your doctor advises it.     Have a diabetes test (fasting glucose) every three years. If you are at risk for diabetes, you should have this test more often.     At age 65, have a bone density scan (DEXA) to check for osteoporosis (brittle bone disease).    Shots:  Get a flu shot each year.  Get a tetanus shot every 10 years.  Talk to your doctor about your pneumonia vaccines. There are now two you should receive - Pneumovax (PPSV 23) and Prevnar (PCV  13).  Talk to your pharmacist about the shingles vaccine.  Talk to your doctor about the hepatitis B vaccine.    Nutrition:   Eat at least 5 servings of fruits and vegetables each day.    Eat whole-grain bread, whole-wheat pasta and brown rice instead of white grains and rice.    Get adequate about Calcium and Vitamin D.     Lifestyle  Exercise at least 150 minutes a week (30 minutes a day, 5 days a week). This will help you control your weight and prevent disease.    Limit alcohol to one drink per day.    No smoking.     Wear sunscreen to prevent skin cancer.     See your dentist twice a year for an exam and cleaning.    See your eye doctor every 1 to 2 years to screen for conditions such as glaucoma, macular degeneration, cataracts, etc.    Personalized Prevention Plan  You are due for the preventive services outlined below.  Your care team is available to assist you in scheduling these services.  If you have already completed any of these items, please share that information with your care team to update in your medical record.    Health Maintenance Due   Topic Date Due    Zoster (Shingles) Vaccine (1 of 2) Never done    COVID-19 Vaccine (3 - Booster for Moderna series) 07/22/2021    ANNUAL REVIEW OF HM ORDERS  06/02/2022    Pneumococcal Vaccine (1 - PCV) 02/17/2022

## 2022-06-07 ENCOUNTER — OFFICE VISIT (OUTPATIENT)
Dept: FAMILY MEDICINE | Facility: CLINIC | Age: 67
End: 2022-06-07
Payer: COMMERCIAL

## 2022-06-07 VITALS
TEMPERATURE: 97.5 F | DIASTOLIC BLOOD PRESSURE: 78 MMHG | WEIGHT: 171 LBS | BODY MASS INDEX: 26.84 KG/M2 | OXYGEN SATURATION: 99 % | HEART RATE: 68 BPM | SYSTOLIC BLOOD PRESSURE: 120 MMHG | HEIGHT: 67 IN

## 2022-06-07 DIAGNOSIS — Z82.62 FAMILY HISTORY OF OSTEOPOROSIS: ICD-10-CM

## 2022-06-07 DIAGNOSIS — E78.5 HYPERLIPIDEMIA LDL GOAL <130: ICD-10-CM

## 2022-06-07 DIAGNOSIS — Z23 HIGH PRIORITY FOR 2019-NCOV VACCINE: ICD-10-CM

## 2022-06-07 DIAGNOSIS — Z23 NEED FOR TDAP VACCINATION: ICD-10-CM

## 2022-06-07 DIAGNOSIS — Z83.49 FAMILY HISTORY OF THYROID DISORDER: ICD-10-CM

## 2022-06-07 DIAGNOSIS — R76.8 ELEVATED ANTINUCLEAR ANTIBODY (ANA) LEVEL: ICD-10-CM

## 2022-06-07 DIAGNOSIS — F33.41 DEPRESSION, MAJOR, RECURRENT, IN PARTIAL REMISSION (H): ICD-10-CM

## 2022-06-07 DIAGNOSIS — D12.6 ADENOMATOUS POLYP OF COLON, UNSPECIFIED PART OF COLON: ICD-10-CM

## 2022-06-07 DIAGNOSIS — F99 INSOMNIA DUE TO OTHER MENTAL DISORDER: ICD-10-CM

## 2022-06-07 DIAGNOSIS — Z23 NEED FOR SHINGLES VACCINE: ICD-10-CM

## 2022-06-07 DIAGNOSIS — Z00.00 ROUTINE GENERAL MEDICAL EXAMINATION AT A HEALTH CARE FACILITY: Primary | ICD-10-CM

## 2022-06-07 DIAGNOSIS — Z15.89 HETEROZYGOUS MTHFR MUTATION C677T: ICD-10-CM

## 2022-06-07 DIAGNOSIS — F51.05 INSOMNIA DUE TO OTHER MENTAL DISORDER: ICD-10-CM

## 2022-06-07 PROCEDURE — 0064A COVID-19,PF,MODERNA (18+ YRS BOOSTER .25ML): CPT | Performed by: PHYSICIAN ASSISTANT

## 2022-06-07 PROCEDURE — 99214 OFFICE O/P EST MOD 30 MIN: CPT | Mod: 25 | Performed by: PHYSICIAN ASSISTANT

## 2022-06-07 PROCEDURE — 90715 TDAP VACCINE 7 YRS/> IM: CPT | Performed by: PHYSICIAN ASSISTANT

## 2022-06-07 PROCEDURE — 91306 COVID-19,PF,MODERNA (18+ YRS BOOSTER .25ML): CPT | Performed by: PHYSICIAN ASSISTANT

## 2022-06-07 PROCEDURE — 90471 IMMUNIZATION ADMIN: CPT | Performed by: PHYSICIAN ASSISTANT

## 2022-06-07 PROCEDURE — 99397 PER PM REEVAL EST PAT 65+ YR: CPT | Mod: 25 | Performed by: PHYSICIAN ASSISTANT

## 2022-06-07 RX ORDER — ZOSTER VACCINE RECOMBINANT, ADJUVANTED 50 MCG/0.5
1 KIT INTRAMUSCULAR ONCE
Qty: 0.5 ML | Refills: 1 | Status: SHIPPED | OUTPATIENT
Start: 2022-06-07 | End: 2022-06-07

## 2022-06-07 RX ORDER — SIMVASTATIN 40 MG
TABLET ORAL
Qty: 90 TABLET | Refills: 3 | Status: SHIPPED | OUTPATIENT
Start: 2022-06-07 | End: 2023-09-25

## 2022-06-07 ASSESSMENT — ENCOUNTER SYMPTOMS
ARTHRALGIAS: 0
BREAST MASS: 0
MYALGIAS: 0
SORE THROAT: 0
HEADACHES: 1
DIARRHEA: 0
PARESTHESIAS: 0
DYSURIA: 0
DIZZINESS: 0
HEARTBURN: 0
HEMATURIA: 0
ABDOMINAL PAIN: 0
CONSTIPATION: 0
COUGH: 0
NAUSEA: 0
JOINT SWELLING: 0
FREQUENCY: 0
SHORTNESS OF BREATH: 0
FEVER: 0
HEMATOCHEZIA: 0
PALPITATIONS: 0
EYE PAIN: 0
CHILLS: 0
WEAKNESS: 0
NERVOUS/ANXIOUS: 0

## 2022-06-07 ASSESSMENT — ANXIETY QUESTIONNAIRES
GAD7 TOTAL SCORE: 5
GAD7 TOTAL SCORE: 5
3. WORRYING TOO MUCH ABOUT DIFFERENT THINGS: SEVERAL DAYS
GAD7 TOTAL SCORE: 5
7. FEELING AFRAID AS IF SOMETHING AWFUL MIGHT HAPPEN: SEVERAL DAYS
2. NOT BEING ABLE TO STOP OR CONTROL WORRYING: SEVERAL DAYS
6. BECOMING EASILY ANNOYED OR IRRITABLE: SEVERAL DAYS
5. BEING SO RESTLESS THAT IT IS HARD TO SIT STILL: NOT AT ALL
8. IF YOU CHECKED OFF ANY PROBLEMS, HOW DIFFICULT HAVE THESE MADE IT FOR YOU TO DO YOUR WORK, TAKE CARE OF THINGS AT HOME, OR GET ALONG WITH OTHER PEOPLE?: SOMEWHAT DIFFICULT
4. TROUBLE RELAXING: NOT AT ALL
7. FEELING AFRAID AS IF SOMETHING AWFUL MIGHT HAPPEN: SEVERAL DAYS
1. FEELING NERVOUS, ANXIOUS, OR ON EDGE: SEVERAL DAYS

## 2022-06-07 ASSESSMENT — PATIENT HEALTH QUESTIONNAIRE - PHQ9
SUM OF ALL RESPONSES TO PHQ QUESTIONS 1-9: 8
10. IF YOU CHECKED OFF ANY PROBLEMS, HOW DIFFICULT HAVE THESE PROBLEMS MADE IT FOR YOU TO DO YOUR WORK, TAKE CARE OF THINGS AT HOME, OR GET ALONG WITH OTHER PEOPLE: SOMEWHAT DIFFICULT
SUM OF ALL RESPONSES TO PHQ QUESTIONS 1-9: 8

## 2022-06-07 ASSESSMENT — ACTIVITIES OF DAILY LIVING (ADL): CURRENT_FUNCTION: NO ASSISTANCE NEEDED

## 2022-06-07 NOTE — PROGRESS NOTES
"   SUBJECTIVE:   CC: Inessa Castaneda is an 67 year old woman who presents for preventive health visit.       Patient has been advised of split billing requirements and indicates understanding: Yes  Healthy Habits:     In general, how would you rate your overall health?  Good    Frequency of exercise:  6-7 days/week    Duration of exercise:  30-45 minutes    Do you usually eat at least 4 servings of fruit and vegetables a day, include whole grains    & fiber and avoid regularly eating high fat or \"junk\" foods?  Yes    Taking medications regularly:  Yes    Medication side effects:  None    Ability to successfully perform activities of daily living:  No assistance needed    Home Safety:  No safety concerns identified    Hearing Impairment:  No hearing concerns    In the past 6 months, have you been bothered by leaking of urine? Yes    In general, how would you rate your overall mental or emotional health?  Good      PHQ-2 Total Score: 2    Additional concerns today:  No      Adenomatous polyp  Noted at age 52.  Normal colonoscopy at age 55.  Last colonoscopy January 2021 was normal with a recommended 7-year follow-up in 2028.    Hyperlipidemia Follow-Up  Simvastatin 40 mg nightly    Are you regularly taking any medication or supplement to lower your cholesterol?   Yes- Simvastatin    Are you having muscle aches or other side effects that you think could be caused by your cholesterol lowering medication?  No  Recent Labs   Lab Test 05/16/22  0838 06/02/21  1651   CHOL 195 231*   HDL 67 82   LDL 93 131*   TRIG 176* 91     Occipital pain/positive LIZZETTE  Inessa was seen by my partner Dr. Penaloza 5/13/2022 and reported 2 days of occipital pain that was occurring a few times a day.  Exam was unremarkable.  Laboratory evaluation did reveal positive LIZZETTE.  She reports that her symptoms resolved within a few days of her appointment without intervention.  She did have an eye exam that was within normal limits per Dr. Penaloza's " recommendation.  She has had no recurrence and feels normal.     Depression/insomnia  Inessa discontinued her citalopram few months ago and states that she is doing quite well since being off of this medication.  She has since retired and is having some struggles with her marriage but is starting to work with a therapist on communication as she felt that rather than develop healthy coping strategies and interventions over the years her medications simply dulled her responses.  She states that she feels clearer off of the citalopram and trazodone and would like to stay off of these if possible.  Today's PHQ-2 Score:   PHQ-2 ( 1999 Pf[employer]) 6/7/2022   Q1: Little interest or pleasure in doing things 1   Q2: Feeling down, depressed or hopeless 1   PHQ-2 Score 2   PHQ-2 Total Score (12-17 Years)- Positive if 3 or more points; Administer PHQ-A if positive -   Q1: Little interest or pleasure in doing things Several days   Q2: Feeling down, depressed or hopeless Several days   PHQ-2 Score 2       Abuse: Current or Past (Physical, Sexual or Emotional) - No  Do you feel safe in your environment? Yes        Social History     Tobacco Use     Smoking status: Never Smoker     Smokeless tobacco: Never Used   Substance Use Topics     Alcohol use: Yes     Alcohol/week: 0.0 standard drinks     Comment: 2 times/month  - 1-2 glasses of wine      If you drink alcohol do you typically have >3 drinks per day or >7 drinks per week? No    Alcohol Use 6/7/2022   Prescreen: >3 drinks/day or >7 drinks/week? No   Prescreen: >3 drinks/day or >7 drinks/week? -       Reviewed orders with patient.  Reviewed health maintenance and updated orders accordingly - Yes  BP Readings from Last 3 Encounters:   06/07/22 120/78   06/02/21 120/80   03/11/20 126/78    Wt Readings from Last 3 Encounters:   06/07/22 77.6 kg (171 lb)   06/02/21 66.7 kg (147 lb)   03/11/20 81.6 kg (180 lb)                  Patient Active Problem List   Diagnosis     Depression,  major, recurrent, in partial remission (H)     Hyperlipidemia LDL goal <130     Genital herpes simplex, unspecified site     Cystocele     Family history of thyroid disorder- mother and sister     Non morbid obesity due to excess calories     Asymptomatic menopausal state     Urge incontinence of urine     Family history of osteoporosis- mother with signif. disease  Normal DEXA 2018 - 5 year follow up recommended     Heterozygous MTHFR mutation C677T (H)-on Genesight testing - had numbness B distal LE with prenatal vit     Insomnia due to other mental disorder- mild depression/anxiety      Numbness in both legs- went away after coming off prenatal vitamin for MTHFR mutation      Hammer toe of second toe of left foot     Enlarged thyroid gland- very mildly enlarged with mildl nodularity  and  firmness      Multiple pigmented nevi- diffuse - with sun-damaged skin      Adenomatous colon polyp-at age 52, then normal colonoscopy at age 55 and then at age 60-  2021 normal - repeat 2028     Past Surgical History:   Procedure Laterality Date     BREAST BIOPSY, RT/LT Right ?     benign     BUNIONECTOMY Left 2008    Dr. Alberto Laws - left foot medial and lateral with left 2nd toe surgery      SLING BLADDER SUSPENSION WITH FASCIA SULAIMAN  2008    - Dr. Emanuel Paz - had DVT afterward      XR ANKLE SURGERY NIXON LEFT  2008    Dr. Alberto Laws -arthroscopy ankle s/p fall - not fused        Social History     Tobacco Use     Smoking status: Never Smoker     Smokeless tobacco: Never Used   Substance Use Topics     Alcohol use: Yes     Alcohol/week: 0.0 standard drinks     Comment: 2 times/month  - 1-2 glasses of wine      Family History   Problem Relation Age of Onset     Cerebrovascular Disease Mother 88        tiny strokes - TIA -  at age 90     Hypertension Mother      Osteoporosis Mother      Alzheimer Disease Father 81         age 89     Heart Disease Father 63        s/p MI - first at  age 63 , then s/p 4 vessel CABG     Mental Illness Sister         bipolar disorder -with heroin addiction - on methadone - no contact for years      Dementia Sister      Substance Abuse Sister         heroin     Bipolar Disorder Sister         bipolar - 2 with hoarding tendencies, psych haluciantions,       Psychosis Sister      Mental Illness Sister         hoarding tendencies     Psychosis Sister      Substance Abuse Sister         Opiates      Mental Illness Brother          after fall while drunk - alcoholic      Aortic dissection Brother 65        dissecting thoracic aortic aneurysm      Hepatitis Brother         Hep C - treated     Breast Cancer Maternal Grandmother      Attention Deficit Disorder Daughter      Substance Abuse Daughter         adderall that was not prescribed     Colon Cancer No family hx of          Current Outpatient Medications   Medication Sig Dispense Refill     calcium carbonate-vitamin D 600-200 MG-UNIT TABS Take 1 tablet by mouth       folic acid (FOLVITE) 1 MG tablet Take 1 tablet (1 mg) by mouth daily       Multiple Vitamins-Minerals (MULTIVITAMIN WOMEN PO)        omega 3 1000 MG CAPS Take 2 g by mouth daily 180 capsule 1     simvastatin (ZOCOR) 40 MG tablet TAKE 1 TABLET BY MOUTH IN  THE EVENING 90 tablet 3     Turmeric (QC TUMERIC COMPLEX PO)        valACYclovir (VALTREX) 500 MG tablet Take 1 tablet (500 mg) by mouth 2 times daily 6 tablet 1       Breast Cancer Screening:    FHS-7:   Breast CA Risk Assessment (FHS-7) 2021   Did any of your first-degree relatives have breast or ovarian cancer? Yes No   Did any of your relatives have bilateral breast cancer? Unknown Unknown   Did any man in your family have breast cancer? Unknown No   Did any woman in your family have breast and ovarian cancer? No No   Did any woman in your family have breast cancer before age 50 y? No No   Do you have 2 or more relatives with breast and/or ovarian cancer? No Unknown   Do you have 2  or more relatives with breast and/or bowel cancer? No Unknown       Mammogram Screening: Recommended mammography every 1-2 years with patient discussion and risk factor consideration  Pertinent mammograms are reviewed under the imaging tab.    History of abnormal Pap smear: NO - age 67 - see link Cervical Cytology Screening Guidelines  PAP / HPV Latest Ref Rng & Units 6/2/2021 3/1/2018 11/17/2015   PAP (Historical) - NIL NIL NIL   HPV16 NEG:Negative Negative Negative -   HPV18 NEG:Negative Negative Negative -   HRHPV NEG:Negative Negative Negative -     Reviewed and updated as needed this visit by clinical staff   Tobacco  Allergies  Meds  Problems  Med Hx  Surg Hx  Fam Hx  Soc   Hx          Reviewed and updated as needed this visit by Provider   Tobacco  Allergies  Meds  Problems  Med Hx  Surg Hx  Fam Hx               Review of Systems   Constitutional: Negative for chills and fever.   HENT: Negative for congestion, ear pain, hearing loss and sore throat.    Eyes: Negative for pain and visual disturbance.   Respiratory: Negative for cough and shortness of breath.    Cardiovascular: Negative for chest pain, palpitations and peripheral edema.   Gastrointestinal: Negative for abdominal pain, constipation, diarrhea, heartburn, hematochezia and nausea.   Breasts:  Negative for tenderness, breast mass and discharge.   Genitourinary: Positive for urgency. Negative for dysuria, frequency, genital sores, hematuria, pelvic pain, vaginal bleeding and vaginal discharge.   Musculoskeletal: Negative for arthralgias, joint swelling and myalgias.   Skin: Negative for rash.   Neurological: Positive for headaches. Negative for dizziness, weakness and paresthesias.   Psychiatric/Behavioral: Positive for mood changes. The patient is not nervous/anxious.           OBJECTIVE:   /78 (BP Location: Right arm, Patient Position: Chair, Cuff Size: Adult Large)   Pulse 68   Temp 97.5  F (36.4  C) (Tympanic)   Ht 1.702 m  "(5' 7\")   Wt 77.6 kg (171 lb)   SpO2 99%   Breastfeeding No   BMI 26.78 kg/m    Physical Exam  GENERAL: healthy, alert and no distress  EYES: Eyes grossly normal to inspection, PERRL and conjunctivae and sclerae normal  HENT: ear canals and TM's normal, nose and mouth without ulcers or lesions  NECK: no adenopathy, no asymmetry, masses, or scars and thyroid normal to palpation  RESP: lungs clear to auscultation - no rales, rhonchi or wheezes  CV: regular rate and rhythm, normal S1 S2, no S3 or S4, no murmur, click or rub, no peripheral edema and peripheral pulses strong  ABDOMEN: soft, nontender, no hepatosplenomegaly, no masses and bowel sounds normal  MS: no gross musculoskeletal defects noted, no edema  SKIN: no suspicious lesions or rashes  NEURO: Normal strength and tone, mentation intact and speech normal  PSYCH: mentation appears normal, affect normal/bright    Diagnostic Test Results:  No results found for any visits on 06/07/22.    ASSESSMENT/PLAN:   Inessa was seen today for physical.    Diagnoses and all orders for this visit:    Routine general medical examination at a health care facility  -     REVIEW OF HEALTH MAINTENANCE PROTOCOL ORDERS    Heterozygous MTHFR mutation C677T (H)-on Genesight testing - had numbness B distal LE with prenatal vit  Bilateral lower extremity numbness has resolved.  Is taking folic acid supplement separately but not in prenatal form any longer.    Depression, major, recurrent, in partial remission (H)  Insomnia due to other mental disorder- mild depression/anxiety   Reports some ongoing struggles but states that she feels that she is doing well and has more clarity off of medication.  Is working with therapy and on her marriage especially since retired.  She will let us know if she feels that he medications are needed in the interim we will continue with therapy for now.    Hyperlipidemia LDL goal <130  Stable.  Continue current regimen  -     simvastatin (ZOCOR) 40 MG " "tablet; TAKE 1 TABLET BY MOUTH IN  THE EVENING    Adenomatous colon polyp-at age 52, then normal colonoscopy at age 55 and then at age 60-  1/2021 normal - repeat 1/2028  Normal colonoscopy 2021.  Plan to repeat 2028.    Family history of osteoporosis- mother with signif. disease  Normal DEXA 2018 - 5 year follow up recommended  We will repeat bone density scan 2023.    Family history of thyroid disorder- mother and sister  TSH normal 5/16/2022    Need for Tdap vaccination  Vaccinated today  -     TDAP VACCINE (Adacel, Boostrix)  [[id number]]    High priority for 2019-nCoV vaccine  Booster administered today  -     COVID-19,PF,MODERNA (18+ Yrs BOOSTER .25mL)    Need for shingles vaccine  Recommended checking with pharmacy to see if this is covered and obtain it if so.  -     zoster vaccine recombinant adjuvanted (SHINGRIX) injection; Inject 0.5 mLs into the muscle once for 1 dose    Elevated LIZZETTE  Resolution of previous occipital pain that prompted evaluation.  No known history of connective tissue/autoimmune disorders outside of thyroid.  We will recheck labs with a nonfasting lab only in 6 months.  Patient voiced understanding and agreement.  She will keep us apprised if symptoms of occipital pain return.    Patient has been advised of split billing requirements and indicates understanding: Yes    COUNSELING:  Reviewed preventive health counseling, as reflected in patient instructions       Regular exercise       Healthy diet/nutrition    Estimated body mass index is 26.78 kg/m  as calculated from the following:    Height as of this encounter: 1.702 m (5' 7\").    Weight as of this encounter: 77.6 kg (171 lb).    Weight management plan: Discussed healthy diet and exercise guidelines    She reports that she has never smoked. She has never used smokeless tobacco.      Counseling Resources:  ATP IV Guidelines  Pooled Cohorts Equation Calculator  Breast Cancer Risk Calculator  BRCA-Related Cancer Risk Assessment: FHS-7 " Tool  FRAX Risk Assessment  ICSI Preventive Guidelines  Dietary Guidelines for Americans, 2010  USDA's MyPlate  ASA Prophylaxis  Lung CA Screening    Doris Cruz PA-C  [facility]  Answers for HPI/ROS submitted by the patient on 6/7/2022  If you checked off any problems, how difficult have these problems made it for you to do your work, take care of things at home, or get along with other people?: Somewhat difficult  PHQ9 TOTAL SCORE: 8  MIGEL 7 TOTAL SCORE: 5

## 2022-06-08 PROBLEM — R76.8 ELEVATED ANTINUCLEAR ANTIBODY (ANA) LEVEL: Status: ACTIVE | Noted: 2022-06-08

## 2022-10-16 ENCOUNTER — HEALTH MAINTENANCE LETTER (OUTPATIENT)
Age: 67
End: 2022-10-16

## 2022-11-01 DIAGNOSIS — A60.00 GENITAL HERPES SIMPLEX, UNSPECIFIED SITE: ICD-10-CM

## 2022-11-01 NOTE — TELEPHONE ENCOUNTER
Medication Question or Refill        What medication are you calling about (include dose and sig)?: Valacyclovir 500mg     Controlled Substance Agreement on file:   CSA -- Patient Level:    CSA: None found at the patient level.       Who prescribed the medication?: Dr. Moore    Do you need a refill? Yes: to keep some on hand when needed     When did you use the medication last?     Patient offered an appointment? No    Do you have any questions or concerns?  No    Preferred Pharmacy:   [facility] - [place], [place] - [address]  [address] [address]  Phone: [phone] Fax: [phone] Alternate Fax: [phone], [phone]    [facility] - [address] [address]  Phone: [phone] Fax: [phone]    WRITTEN PRESCRIPTION REQUESTED  No address on file      [id number] [place] - [facility] [address] [facility] [address]  [address] [place] [address]  Phone: [phone] Fax: [phone]      Could we send this information to you in [facility] or would you prefer to receive a phone call?:   Patient would prefer a phone call   Okay to leave a detailed message?: Yes at Cell number on file:    Telephone Information:   Mobile [phone]

## 2022-11-02 RX ORDER — VALACYCLOVIR HYDROCHLORIDE 500 MG/1
500 TABLET, FILM COATED ORAL 2 TIMES DAILY
Qty: 6 TABLET | Refills: 1 | Status: SHIPPED | OUTPATIENT
Start: 2022-11-02 | End: 2022-11-16

## 2022-11-15 NOTE — TELEPHONE ENCOUNTER
Pt is now on medicare, [facility]     [facility].    Pauline Todd

## 2022-11-16 RX ORDER — VALACYCLOVIR HYDROCHLORIDE 500 MG/1
500 TABLET, FILM COATED ORAL 2 TIMES DAILY
Qty: 6 TABLET | Refills: 1 | Status: SHIPPED | OUTPATIENT
Start: 2022-11-16 | End: 2023-09-29

## 2022-11-16 NOTE — TELEPHONE ENCOUNTER
Prescription approved per [facility] Refill Protocol.  To corrected pharmacy     Amber Kay RN

## 2022-12-09 ENCOUNTER — MEDICAL CORRESPONDENCE (OUTPATIENT)
Dept: HEALTH INFORMATION MANAGEMENT | Facility: CLINIC | Age: 67
End: 2022-12-09

## 2023-01-20 ENCOUNTER — TELEPHONE (OUTPATIENT)
Dept: FAMILY MEDICINE | Facility: CLINIC | Age: 68
End: 2023-01-20
Payer: COMMERCIAL

## 2023-01-20 NOTE — TELEPHONE ENCOUNTER
Medication Question or Refill    Contacts       Type Contact Phone/Fax    01/20/2023 11:25 AM CST Phone (Incoming) Inessa Castaneda (Self) [phone] (H)          What medication are you calling about (include dose and sig)?:  Medication for depression she has been off for awhile now and believes that she should go back on.       Who prescribed the medication?:  Vanderscoff    Do you need a refill? Yes:     When did you use the medication last? Over six months ago    Patient offered an appointment? No    Do you have any questions or concerns?  Yes: had the Gene Sight test to enable her to find the correct medication.    Preferred Pharmacy:   [facility] [place], [place] - [address]  [address] [address]  Phone: [phone] Fax: [phone] Alternate Fax: [phone], [phone]    Phone: [phone] Fax: [phone]    [facility] [address] [address]  Phone: [phone] Fax: [phone]      Could we send this information to you in [facility] or would you prefer to receive a phone call?:   Patient would prefer a phone call   Okay to leave a detailed message?: Yes at Cell number on file:    Telephone Information:   Mobile [phone]

## 2023-02-02 ENCOUNTER — OFFICE VISIT (OUTPATIENT)
Dept: FAMILY MEDICINE | Facility: CLINIC | Age: 68
End: 2023-02-02
Payer: COMMERCIAL

## 2023-02-02 VITALS
OXYGEN SATURATION: 98 % | DIASTOLIC BLOOD PRESSURE: 78 MMHG | WEIGHT: 183 LBS | HEART RATE: 84 BPM | SYSTOLIC BLOOD PRESSURE: 118 MMHG | BODY MASS INDEX: 28.72 KG/M2 | HEIGHT: 67 IN | RESPIRATION RATE: 16 BRPM | TEMPERATURE: 98.6 F

## 2023-02-02 DIAGNOSIS — F33.41 DEPRESSION, MAJOR, RECURRENT, IN PARTIAL REMISSION (H): Primary | ICD-10-CM

## 2023-02-02 DIAGNOSIS — F33.8 SEASONAL AFFECTIVE DISORDER (H): ICD-10-CM

## 2023-02-02 DIAGNOSIS — E72.12 METHYLENETETRAHYDROFOLATE REDUCTASE DEFICIENCY (H): ICD-10-CM

## 2023-02-02 DIAGNOSIS — F41.9 ANXIETY: ICD-10-CM

## 2023-02-02 DIAGNOSIS — Z12.31 VISIT FOR SCREENING MAMMOGRAM: ICD-10-CM

## 2023-02-02 PROCEDURE — 96127 BRIEF EMOTIONAL/BEHAV ASSMT: CPT | Performed by: FAMILY MEDICINE

## 2023-02-02 PROCEDURE — 99214 OFFICE O/P EST MOD 30 MIN: CPT | Mod: 25 | Performed by: FAMILY MEDICINE

## 2023-02-02 PROCEDURE — 0134A COVID-19 VACCINE BIVALENT BOOSTER 18+ (MODERNA): CPT | Performed by: FAMILY MEDICINE

## 2023-02-02 PROCEDURE — G0009 ADMIN PNEUMOCOCCAL VACCINE: HCPCS | Performed by: FAMILY MEDICINE

## 2023-02-02 PROCEDURE — 91313 COVID-19 VACCINE BIVALENT BOOSTER 18+ (MODERNA): CPT | Performed by: FAMILY MEDICINE

## 2023-02-02 PROCEDURE — 90677 PCV20 VACCINE IM: CPT | Performed by: FAMILY MEDICINE

## 2023-02-02 RX ORDER — VENLAFAXINE HYDROCHLORIDE 37.5 MG/1
37.5 CAPSULE, EXTENDED RELEASE ORAL DAILY
Qty: 60 CAPSULE | Refills: 1 | Status: SHIPPED | OUTPATIENT
Start: 2023-02-02 | End: 2023-03-02

## 2023-02-02 ASSESSMENT — ANXIETY QUESTIONNAIRES
7. FEELING AFRAID AS IF SOMETHING AWFUL MIGHT HAPPEN: NOT AT ALL
8. IF YOU CHECKED OFF ANY PROBLEMS, HOW DIFFICULT HAVE THESE MADE IT FOR YOU TO DO YOUR WORK, TAKE CARE OF THINGS AT HOME, OR GET ALONG WITH OTHER PEOPLE?: SOMEWHAT DIFFICULT
GAD7 TOTAL SCORE: 6
3. WORRYING TOO MUCH ABOUT DIFFERENT THINGS: SEVERAL DAYS
GAD7 TOTAL SCORE: 6
5. BEING SO RESTLESS THAT IT IS HARD TO SIT STILL: NOT AT ALL
2. NOT BEING ABLE TO STOP OR CONTROL WORRYING: SEVERAL DAYS
GAD7 TOTAL SCORE: 6
IF YOU CHECKED OFF ANY PROBLEMS ON THIS QUESTIONNAIRE, HOW DIFFICULT HAVE THESE PROBLEMS MADE IT FOR YOU TO DO YOUR WORK, TAKE CARE OF THINGS AT HOME, OR GET ALONG WITH OTHER PEOPLE: SOMEWHAT DIFFICULT
4. TROUBLE RELAXING: NOT AT ALL
7. FEELING AFRAID AS IF SOMETHING AWFUL MIGHT HAPPEN: NOT AT ALL
6. BECOMING EASILY ANNOYED OR IRRITABLE: NEARLY EVERY DAY
1. FEELING NERVOUS, ANXIOUS, OR ON EDGE: SEVERAL DAYS

## 2023-02-02 ASSESSMENT — PATIENT HEALTH QUESTIONNAIRE - PHQ9
SUM OF ALL RESPONSES TO PHQ QUESTIONS 1-9: 8
SUM OF ALL RESPONSES TO PHQ QUESTIONS 1-9: 8
10. IF YOU CHECKED OFF ANY PROBLEMS, HOW DIFFICULT HAVE THESE PROBLEMS MADE IT FOR YOU TO DO YOUR WORK, TAKE CARE OF THINGS AT HOME, OR GET ALONG WITH OTHER PEOPLE: SOMEWHAT DIFFICULT

## 2023-02-02 NOTE — PATIENT INSTRUCTIONS
[facility]  [address] [place] [address]  Office: [phone]   Fax:    [phone]     Dr. Olga Ruiz - Olga Ruiz, MDiv, MA, LMFT.  At  [facility],  [address], [place] [address]  [phone]  She is a former [employer]  S[employer]  who now does individual and family counseling.  She is awesome!    Mental health referral placed. Counseling coverage is very insurance driven. An intake person will call pt , but they  can narrow down possible choices by going to [facility].  In the search field type in therapists or counselors in the the [place] metro area and a bunch of therapists choices will come up with their pictures, backrounds, education and their areas of expertise.

## 2023-02-02 NOTE — PROGRESS NOTES
"  Assessment & Plan       ICD-10-CM    1. Depression, major, recurrent, in partial remission (H)  F33.41 Adult Mental Health  Referral     venlafaxine (EFFEXOR XR) 37.5 MG 24 hr capsule      2. Anxiety  F41.9 venlafaxine (EFFEXOR XR) 37.5 MG 24 hr capsule      3. Seasonal affective disorder (H)  F33.8 venlafaxine (EFFEXOR XR) 37.5 MG 24 hr capsule      4. Methylenetetrahydrofolate reductase deficiency (H)  E72.12       5. Visit for screening mammogram  Z12.31 MA Screening Bilateral w/ Mikey            Prescription drug management  39 minutes spent on the date of the encounter doing chart review, history and exam, documentation and further activities per the note       BMI:   Estimated body mass index is 28.66 kg/m  as calculated from the following:    Height as of this encounter: 1.702 m (5' 7\").    Weight as of this encounter: 83 kg (183 lb).   Weight management plan: Discussed healthy diet and exercise guidelines    MEDICATIONS:   Orders Placed This Encounter   Medications     venlafaxine (EFFEXOR XR) 37.5 MG 24 hr capsule     Sig: Take 1 capsule (37.5 mg) by mouth daily For 14 days , then increase to 2 capsules daily     Dispense:  60 capsule     Refill:  1          - Continue other medications without change  Work on weight loss  Regular exercise  See Patient Instructions    Return in about 4 weeks (around 3/2/2023) for depression/anxiety follow up, as a video visit, w/ Dr. GIBBS for 40 minute appointment.           Gabriela Moore MD  [facility] is a 67 year old, presenting for the following health issues:  Depression      History of Present Illness       Mental Health Follow-up:  Patient presents to follow-up on Depression & Anxiety.Patient's depression since last visit has been:  Medium  The patient is having other symptoms associated with depression.  Patient's anxiety since last visit has been:  Medium  The patient is having other symptoms " "associated with anxiety.  Any significant life events: No  Patient is not feeling anxious or having panic attacks.  Patient has no concerns about alcohol or drug use.    She eats 4 or more servings of fruits and vegetables daily.She consumes 0 sweetened beverage(s) daily.She exercises with enough effort to increase her heart rate 30 to 60 minutes per day.  She exercises with enough effort to increase her heart rate 7 days per week.   She is taking medications regularly.    Today's PHQ-9         PHQ-9 Total Score: 8    PHQ-9 Q9 Thoughts of better off dead/self-harm past 2 weeks :   Not at all    How difficult have these problems made it for you to do your work, take care of things at home, or get along with other people: Somewhat difficult  Today's MIGEL-7 Score: 6      not really supportive of counseling. Relationship with her  has been really difficult.  Has no desire.  Sexual intercourse has been painful for her emotionally.   They still have intercourse  Once a week because he requests it of her and she grimaces her way through it.   Pt states she doesn't feel her  forces her to have sex against her will.      She feels a lot of ugly heaviness.     Went off medication about a year ago - \"you should be able to handle things on your own\"   \"I feel bad about things\"   Don't want to lay things on my family.   Winter has really affected me.     Likes the therapist - Michael Cee.      Pt did [employer] testing. With Doris Cruz PA-C.  - Venlafaxine looks like it would be a good next choice for her that would be covered for her with Medicare.           Social History     Tobacco Use     Smoking status: Never     Smokeless tobacco: Never   Vaping Use     Vaping Use: Never used   Substance Use Topics     Alcohol use: Yes     Alcohol/week: 0.0 standard drinks     Comment: 2 times/month  - 1-2 glasses of wine      Drug use: No     Comment: no herbal meds either      PHQ 6/2/2021 6/7/2022 2/2/2023 "   PHQ-9 Total Score 4 8 8   Q9: Thoughts of better off dead/self-harm past 2 weeks Not at all Not at all Not at all     MIGEL-7 SCORE 6/2/2021 6/7/2022 2/2/2023   Total Score - 5 (mild anxiety) 6 (mild anxiety)   Total Score 0 5 6     Last PHQ-9 2/2/2023   1.  Little interest or pleasure in doing things 1   2.  Feeling down, depressed, or hopeless 1   3.  Trouble falling or staying asleep, or sleeping too much 1   4.  Feeling tired or having little energy 1   5.  Poor appetite or overeating 3   6.  Feeling bad about yourself 1   7.  Trouble concentrating 0   8.  Moving slowly or restless 0   Q9: Thoughts of better off dead/self-harm past 2 weeks 0   PHQ-9 Total Score 8   Difficulty at work, home, or with people -     MIGEL-7  2/2/2023   1. Feeling nervous, anxious, or on edge 1   2. Not being able to stop or control worrying 1   3. Worrying too much about different things 1   4. Trouble relaxing 0   5. Being so restless that it is hard to sit still 0   6. Becoming easily annoyed or irritable 3   7. Feeling afraid, as if something awful might happen 0   MIGEL-7 Total Score 6   If you checked any problems, how difficult have they made it for you to do your work, take care of things at home, or get along with other people? Somewhat difficult       Suicide Assessment Five-step Evaluation and Treatment (SAFE-T)    Patient Active Problem List   Diagnosis     Depression, major, recurrent, in partial remission (H)     Hyperlipidemia LDL goal <130     Genital herpes simplex, unspecified site     Cystocele     Family history of thyroid disorder- mother and sister     Non morbid obesity due to excess calories     Asymptomatic menopausal state     Urge incontinence of urine     Family history of osteoporosis- mother with signif. disease  Normal DEXA 2018 - 5 year follow up recommended     Heterozygous MTHFR mutation C677T (H)-on [employer] testing - had numbness B distal LE with prenatal vit     Insomnia due to other mental disorder-  "mild depression/anxiety      Numbness in both legs- went away after coming off prenatal vitamin for MTHFR mutation      Hammer toe of second toe of left foot     Enlarged thyroid gland- very mildly enlarged with mildl nodularity  and  firmness      Multiple pigmented nevi- diffuse - with sun-damaged skin      Adenomatous colon polyp-at age 52, then normal colonoscopy at age 55 and then at age 60-  1/2021 normal - repeat 1/2028     Elevated antinuclear antibody (LIZZETTE) level     Methylenetetrahydrofolate reductase deficiency (H)       Current Outpatient Medications   Medication Sig Dispense Refill     calcium carbonate-vitamin D 600-200 MG-UNIT TABS Take 1 tablet by mouth       Multiple Vitamins-Minerals (MULTIVITAMIN WOMEN PO)        omega 3 1000 MG CAPS Take 2 g by mouth daily 180 capsule 1     simvastatin (ZOCOR) 40 MG tablet TAKE 1 TABLET BY MOUTH IN  THE EVENING 90 tablet 3     Turmeric (QC TUMERIC COMPLEX PO)        valACYclovir (VALTREX) 500 MG tablet Take 1 tablet (500 mg) by mouth 2 times daily 6 tablet 1     venlafaxine (EFFEXOR XR) 37.5 MG 24 hr capsule Take 1 capsule (37.5 mg) by mouth daily For 14 days , then increase to 2 capsules daily 60 capsule 1     folic acid (FOLVITE) 1 MG tablet Take 1 tablet (1 mg) by mouth daily (Patient not taking: Reported on 2/2/2023)            Allergies   Allergen Reactions     Sulfa Drugs      rash           Review of Systems :   Constitutional, HEENT, cardiovascular, pulmonary, GI, , musculoskeletal, neuro, skin, endocrine and psych systems are negative, except as otherwise noted.      Objective    /78   Pulse 84   Temp 98.6  F (37  C)   Resp 16   Ht 1.702 m (5' 7\")   Wt 83 kg (183 lb)   SpO2 98%   BMI 28.66 kg/m    Body mass index is 28.66 kg/m .  Physical Exam :   GENERAL: healthy, alert and no distress  NEURO: Normal strength and tone, mentation intact and speech normal  PSYCH: mentation appears normal, affect quite depressed today.    Lab on 05/16/2022 "   Component Date Value Ref Range Status     Sodium 05/16/2022 142  133 - 144 mmol/L Final     Potassium 05/16/2022 4.2  3.4 - 5.3 mmol/L Final    This is a corrected result. Previous result was 4.0 mmol/L on 5/18/2022 at  5:48 PM CDT     Chloride 05/16/2022 109  94 - 109 mmol/L Final    This is a corrected result. Previous result was 110 mmol/L on 5/16/2022 at  4:45 PM CDT     Carbon Dioxide (CO2) 05/16/2022 22  20 - 32 mmol/L Final     Anion Gap 05/16/2022 11  3 - 14 mmol/L Final     Urea Nitrogen 05/16/2022 26  7 - 30 mg/dL Final     Creatinine 05/16/2022 0.91  0.52 - 1.04 mg/dL Final     Calcium 05/16/2022 9.7  8.5 - 10.1 mg/dL Final     Glucose 05/16/2022 84  70 - 99 mg/dL Final     Alkaline Phosphatase 05/16/2022 50  40 - 150 U/L Final     AST 05/16/2022 16  0 - 45 U/L Final     ALT 05/16/2022 17  0 - 50 U/L Final     Protein Total 05/16/2022 7.3  6.8 - 8.8 g/dL Final     Albumin 05/16/2022 3.9  3.4 - 5.0 g/dL Final     Bilirubin Total 05/16/2022 0.6  0.2 - 1.3 mg/dL Final     GFR Estimate 05/16/2022 70  >60 mL/min/1.73m2 Final    Effective December 21, 2021 eGFRcr in adults is calculated using the 2021 CKD-EPI creatinine equation which includes age and gender (Cash et al., NEJM, DOI: 10.1056/UHRKxl3213122)     Cholesterol 05/16/2022 195  <200 mg/dL Final     Triglycerides 05/16/2022 176 (H)  <150 mg/dL Final     Direct Measure HDL 05/16/2022 67  >=50 mg/dL Final     LDL Cholesterol Calculated 05/16/2022 93  <=100 mg/dL Final     Non HDL Cholesterol 05/16/2022 128  <130 mg/dL Final     Patient Fasting > 8hrs? 05/16/2022 Yes   Final     CRP Inflammation 05/16/2022 3.4  0.0 - 8.0 mg/L Final     Erythrocyte Sedimentation Rate 05/16/2022 8  0 - 30 mm/hr Final     LIZZETTE interpretation 05/16/2022 Positive (A)  Negative Final      Negative:              <1:40  Borderline Positive:   1:40 - 1:80  Positive:              >1:80     LIZZETTE pattern 1 05/16/2022 Dense fine speckled   Final     LIZZETTE titer 1 05/16/2022 1:160    Final     TSH 05/16/2022 2.37  0.40 - 4.00 mU/L Final     WBC Count 05/16/2022 3.6 (L)  4.0 - 11.0 10e3/uL Final     RBC Count 05/16/2022 4.20  3.80 - 5.20 10e6/uL Final     Hemoglobin 05/16/2022 13.3  11.7 - 15.7 g/dL Final     Hematocrit 05/16/2022 39.2  35.0 - 47.0 % Final     MCV 05/16/2022 93  78 - 100 fL Final     MCH 05/16/2022 31.7  26.5 - 33.0 pg Final     MCHC 05/16/2022 33.9  31.5 - 36.5 g/dL Final     RDW 05/16/2022 13.0  10.0 - 15.0 % Final     Platelet Count 05/16/2022 213  150 - 450 10e3/uL Final

## 2023-03-02 ENCOUNTER — VIRTUAL VISIT (OUTPATIENT)
Dept: FAMILY MEDICINE | Facility: CLINIC | Age: 68
End: 2023-03-02
Payer: COMMERCIAL

## 2023-03-02 DIAGNOSIS — F33.41 DEPRESSION, MAJOR, RECURRENT, IN PARTIAL REMISSION (H): ICD-10-CM

## 2023-03-02 DIAGNOSIS — F33.8 SEASONAL AFFECTIVE DISORDER (H): ICD-10-CM

## 2023-03-02 DIAGNOSIS — E72.12 METHYLENETETRAHYDROFOLATE REDUCTASE DEFICIENCY (H): ICD-10-CM

## 2023-03-02 DIAGNOSIS — F41.9 ANXIETY: ICD-10-CM

## 2023-03-02 PROCEDURE — 99213 OFFICE O/P EST LOW 20 MIN: CPT | Mod: VID | Performed by: FAMILY MEDICINE

## 2023-03-02 RX ORDER — VENLAFAXINE HYDROCHLORIDE 37.5 MG/1
37.5 CAPSULE, EXTENDED RELEASE ORAL DAILY
Qty: 90 CAPSULE | Refills: 1 | Status: SHIPPED | OUTPATIENT
Start: 2023-03-02 | End: 2023-09-15

## 2023-03-02 ASSESSMENT — PATIENT HEALTH QUESTIONNAIRE - PHQ9
SUM OF ALL RESPONSES TO PHQ QUESTIONS 1-9: 5
10. IF YOU CHECKED OFF ANY PROBLEMS, HOW DIFFICULT HAVE THESE PROBLEMS MADE IT FOR YOU TO DO YOUR WORK, TAKE CARE OF THINGS AT HOME, OR GET ALONG WITH OTHER PEOPLE: NOT DIFFICULT AT ALL
SUM OF ALL RESPONSES TO PHQ QUESTIONS 1-9: 5

## 2023-03-02 ASSESSMENT — ANXIETY QUESTIONNAIRES
6. BECOMING EASILY ANNOYED OR IRRITABLE: SEVERAL DAYS
3. WORRYING TOO MUCH ABOUT DIFFERENT THINGS: SEVERAL DAYS
GAD7 TOTAL SCORE: 4
GAD7 TOTAL SCORE: 4
4. TROUBLE RELAXING: NOT AT ALL
7. FEELING AFRAID AS IF SOMETHING AWFUL MIGHT HAPPEN: NOT AT ALL
8. IF YOU CHECKED OFF ANY PROBLEMS, HOW DIFFICULT HAVE THESE MADE IT FOR YOU TO DO YOUR WORK, TAKE CARE OF THINGS AT HOME, OR GET ALONG WITH OTHER PEOPLE?: NOT DIFFICULT AT ALL
1. FEELING NERVOUS, ANXIOUS, OR ON EDGE: SEVERAL DAYS
2. NOT BEING ABLE TO STOP OR CONTROL WORRYING: SEVERAL DAYS
5. BEING SO RESTLESS THAT IT IS HARD TO SIT STILL: NOT AT ALL
IF YOU CHECKED OFF ANY PROBLEMS ON THIS QUESTIONNAIRE, HOW DIFFICULT HAVE THESE PROBLEMS MADE IT FOR YOU TO DO YOUR WORK, TAKE CARE OF THINGS AT HOME, OR GET ALONG WITH OTHER PEOPLE: NOT DIFFICULT AT ALL
GAD7 TOTAL SCORE: 4
7. FEELING AFRAID AS IF SOMETHING AWFUL MIGHT HAPPEN: NOT AT ALL

## 2023-03-02 NOTE — PATIENT INSTRUCTIONS
[facility]  [address] [place] [address]  Office: [phone]   Fax:    [phone]       Recommend calcium 1200mg daily and  vitamin D 1000iu daily in the summer and 2000iu in the fall, winter and spring, and daily exercise with some resistance training to help keep your bones strong. Recent research has shown that daily or every other day weight bearing exercise with resistance training is especially important in maintaining and increasing bone density and preventing osteoporosis.     If you take the above with magnesium 250mg to 400mg daily, you should not get constipation. The magnesium can also help prevent leg cramps and helps the calcium absorb a bit better.

## 2023-03-02 NOTE — PROGRESS NOTES
Inessa is a 68 year old who is being evaluated via a billable video visit.      How would you like to obtain your AVS? MyChart  If the video visit is dropped, the invitation should be resent by: Text to cell phone: [phone]  Will anyone else be joining your video visit? No  25      Assessment & Plan       ICD-10-CM    1. Depression, major, recurrent, in partial remission (H)  F33.41 venlafaxine (EFFEXOR XR) 37.5 MG 24 hr capsule      2. Anxiety  F41.9 venlafaxine (EFFEXOR XR) 37.5 MG 24 hr capsule      3. Seasonal affective disorder (H)  F33.8 venlafaxine (EFFEXOR XR) 37.5 MG 24 hr capsule      4. 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     Ordering of each unique test  Prescription drug management  25minutes spent on the date of the encounter doing chart review, history and exam, documentation and further activities per the note       MEDICATIONS:  Continue current medications without change  Regular exercise  See Patient Instructions    Return in about 3 months (around 6/7/2023) for Physical/Preventative Visit, w/ Dr. GIBBS for 40 minute appointment.           Gabriela Moore MD  [facility] [place]    Subjective :   Inessa is a 68 year old, presenting for the following health issues:  Recheck Medication  and the following other medical problems:      1. Depression, major, recurrent, in partial remission (H)    2. Anxiety    3. Seasonal affective disorder (H)    4. Methylenetetrahydrofolate reductase deficiency (H)        Started on 37.5mg venlafaxine on 2/2/2023  And was going to go up to 75mg , but has had signif. Benefit from the 37.5mg. so she'd like to stay there.     Has had a weird tingling /numbness left side from her elbow down to her fingers.  Not interfering with her .       History of Present Illness       Mental Health Follow-up:  Patient presents  to follow-up on Depression & Anxiety.Patient's depression since last visit has been:  Good  The patient is not having other symptoms associated with depression.  Patient's anxiety since last visit has been:  Better  The patient is having other symptoms associated with anxiety.  Any significant life events: No  Patient is feeling anxious or having panic attacks.  Patient has no concerns about alcohol or drug use.     Today's PHQ-9         PHQ-9 Total Score: 5    PHQ-9 Q9 Thoughts of better off dead/self-harm past 2 weeks :   Not at all    How difficult have these problems made it for you to do your work, take care of things at home, or get along with other people: Not difficult at all  Today's MIGEL-7 Score: 4       Depression and Anxiety Follow-Up:     How are you doing with your depression since your last visit? Improved    How are you doing with your anxiety since your last visit?  Improved     Are you having other symptoms that might be associated with depression or anxiety? No    Have you had a significant life event? No     Do you have any concerns with your use of alcohol or other drugs? No    Social History     Tobacco Use     Smoking status: Never     Smokeless tobacco: Never   Vaping Use     Vaping Use: Never used   Substance Use Topics     Alcohol use: Yes     Alcohol/week: 0.0 standard drinks     Comment: 2 times/month  - 1-2 glasses of wine      Drug use: No     Comment: no herbal meds either      PHQ 6/7/2022 2/2/2023 3/2/2023   PHQ-9 Total Score 8 8 5   Q9: Thoughts of better off dead/self-harm past 2 weeks Not at all Not at all Not at all     MIGEL-7 SCORE 6/7/2022 2/2/2023 3/2/2023   Total Score 5 (mild anxiety) 6 (mild anxiety) 4 (minimal anxiety)   Total Score 5 6 4     Last PHQ-9 3/2/2023   1.  Little interest or pleasure in doing things 0   2.  Feeling down, depressed, or hopeless 0   3.  Trouble falling or staying asleep, or sleeping too much 1   4.  Feeling tired or having little energy 0   5.   Poor appetite or overeating 3   6.  Feeling bad about yourself 1   7.  Trouble concentrating 0   8.  Moving slowly or restless 0   Q9: Thoughts of better off dead/self-harm past 2 weeks 0   PHQ-9 Total Score 5   Difficulty at work, home, or with people -     MIGEL-7  3/2/2023   1. Feeling nervous, anxious, or on edge 1   2. Not being able to stop or control worrying 1   3. Worrying too much about different things 1   4. Trouble relaxing 0   5. Being so restless that it is hard to sit still 0   6. Becoming easily annoyed or irritable 1   7. Feeling afraid, as if something awful might happen 0   MIGEL-7 Total Score 4   If you checked any problems, how difficult have they made it for you to do your work, take care of things at home, or get along with other people? Not difficult at all     Patient Active Problem List   Diagnosis     Depression, major, recurrent, in partial remission (H)     Hyperlipidemia LDL goal <130     Genital herpes simplex, unspecified site     Cystocele     Family history of thyroid disorder- mother and sister     Non morbid obesity due to excess calories     Asymptomatic menopausal state     Urge incontinence of urine     Family history of osteoporosis- mother with signif. disease  Normal DEXA 2018 - 5 year follow up recommended     Heterozygous MTHFR mutation C677T (H)-on [employer] testing - had numbness B distal LE with prenatal vit     Insomnia due to other mental disorder- mild depression/anxiety      Numbness in both legs- went away after coming off prenatal vitamin for MTHFR mutation      Hammer toe of second toe of left foot     Enlarged thyroid gland- very mildly enlarged with mildl nodularity  and  firmness      Multiple pigmented nevi- diffuse - with sun-damaged skin      Adenomatous colon polyp-at age 52, then normal colonoscopy at age 55 and then at age 60-  1/2021 normal - repeat 1/2028     Elevated antinuclear antibody (LIZZETTE) level     Methylenetetrahydrofolate reductase deficiency (H)      Anxiety     Seasonal affective disorder (H)       Current Outpatient Medications   Medication Sig Dispense Refill     calcium carbonate-vitamin D 600-200 MG-UNIT TABS Take 1 tablet by mouth       folic acid (FOLVITE) 1 MG tablet Take 1 tablet (1 mg) by mouth daily       Multiple Vitamins-Minerals (MULTIVITAMIN WOMEN PO)        omega 3 1000 MG CAPS Take 2 g by mouth daily 180 capsule 1     simvastatin (ZOCOR) 40 MG tablet TAKE 1 TABLET BY MOUTH IN  THE EVENING 90 tablet 3     Turmeric (QC TUMERIC COMPLEX PO)        valACYclovir (VALTREX) 500 MG tablet Take 1 tablet (500 mg) by mouth 2 times daily 6 tablet 1     venlafaxine (EFFEXOR XR) 37.5 MG 24 hr capsule Take 1 capsule (37.5 mg) by mouth daily 90 capsule 1          Allergies   Allergen Reactions     Sulfa Drugs      rash            Review of Systems   Constitutional, HEENT, cardiovascular, pulmonary, GI, , musculoskeletal, neuro, skin, endocrine and psych systems are negative, except as otherwise noted.      Objective           Vitals:  No vitals were obtained today due to virtual visit.    Physical Exam   GENERAL: Healthy, alert and no distress  EYES: Eyes grossly normal to inspection.  No discharge or erythema, or obvious scleral/conjunctival abnormalities.  RESP: No audible wheeze, cough, or visible cyanosis.  No visible retractions or increased work of breathing.    SKIN: Visible skin clear. No significant rash, abnormal pigmentation or lesions.  NEURO: Cranial nerves grossly intact.  Mentation and speech appropriate for age.  PSYCH: Mentation appears normal, affect normal/bright, judgement and insight intact, normal speech and appearance well-groomed.    Lab on 05/16/2022   Component Date Value Ref Range Status     Sodium 05/16/2022 142  133 - 144 mmol/L Final     Potassium 05/16/2022 4.2  3.4 - 5.3 mmol/L Final    This is a corrected result. Previous result was 4.0 mmol/L on 5/18/2022 at  5:48 PM CDT     Chloride 05/16/2022 109  94 - 109 mmol/L Final     This is a corrected result. Previous result was 110 mmol/L on 5/16/2022 at  4:45 PM CDT     Carbon Dioxide (CO2) 05/16/2022 22  20 - 32 mmol/L Final     Anion Gap 05/16/2022 11  3 - 14 mmol/L Final     Urea Nitrogen 05/16/2022 26  7 - 30 mg/dL Final     Creatinine 05/16/2022 0.91  0.52 - 1.04 mg/dL Final     Calcium 05/16/2022 9.7  8.5 - 10.1 mg/dL Final     Glucose 05/16/2022 84  70 - 99 mg/dL Final     Alkaline Phosphatase 05/16/2022 50  40 - 150 U/L Final     AST 05/16/2022 16  0 - 45 U/L Final     ALT 05/16/2022 17  0 - 50 U/L Final     Protein Total 05/16/2022 7.3  6.8 - 8.8 g/dL Final     Albumin 05/16/2022 3.9  3.4 - 5.0 g/dL Final     Bilirubin Total 05/16/2022 0.6  0.2 - 1.3 mg/dL Final     GFR Estimate 05/16/2022 70  >60 mL/min/1.73m2 Final    Effective December 21, 2021 eGFRcr in adults is calculated using the 2021 CKD-EPI creatinine equation which includes age and gender (Cash et al., NEJ, DOI: 10.1056/QSJPsm0474016)     Cholesterol 05/16/2022 195  <200 mg/dL Final     Triglycerides 05/16/2022 176 (H)  <150 mg/dL Final     Direct Measure HDL 05/16/2022 67  >=50 mg/dL Final     LDL Cholesterol Calculated 05/16/2022 93  <=100 mg/dL Final     Non HDL Cholesterol 05/16/2022 128  <130 mg/dL Final     Patient Fasting > 8hrs? 05/16/2022 Yes   Final     CRP Inflammation 05/16/2022 3.4  0.0 - 8.0 mg/L Final     Erythrocyte Sedimentation Rate 05/16/2022 8  0 - 30 mm/hr Final     LIZZETTE interpretation 05/16/2022 Positive (A)  Negative Final      Negative:              <1:40  Borderline Positive:   1:40 - 1:80  Positive:              >1:80     LIZZETTE pattern 1 05/16/2022 Dense fine speckled   Final     LIZZETTE titer 1 05/16/2022 1:160   Final     TSH 05/16/2022 2.37  0.40 - 4.00 mU/L Final     WBC Count 05/16/2022 3.6 (L)  4.0 - 11.0 10e3/uL Final     RBC Count 05/16/2022 4.20  3.80 - 5.20 10e6/uL Final     Hemoglobin 05/16/2022 13.3  11.7 - 15.7 g/dL Final     Hematocrit 05/16/2022 39.2  35.0 - 47.0 % Final     MCV  05/16/2022 93  78 - 100 fL Final     MCH 05/16/2022 31.7  26.5 - 33.0 pg Final     MCHC 05/16/2022 33.9  31.5 - 36.5 g/dL Final     RDW 05/16/2022 13.0  10.0 - 15.0 % Final     Platelet Count 05/16/2022 213  150 - 450 10e3/uL Final               Video-Visit Details    Type of service:  Video Visit   Video Start Time: 5:14 PM  Video End Time:5:31 PM    Originating Location (pt. Location): Home  Distant Location (provider location):  On-site  Platform used for Video Visit: Des

## 2023-03-15 ENCOUNTER — TELEPHONE (OUTPATIENT)
Dept: FAMILY MEDICINE | Facility: CLINIC | Age: 68
End: 2023-03-15
Payer: COMMERCIAL

## 2023-03-15 NOTE — TELEPHONE ENCOUNTER
Patient called to ask about calcium and vitamin D dose on AVS     Patient wanted to see what the 600 and 200 meant to make sure she  Gets the right doses, explained 600 mg of calcium and 200 international unit(s) of vitamin D.     Yulissa BUTT RN   [facility] Triage

## 2023-04-27 ENCOUNTER — TELEPHONE (OUTPATIENT)
Dept: FAMILY MEDICINE | Facility: CLINIC | Age: 68
End: 2023-04-27
Payer: COMMERCIAL

## 2023-04-27 NOTE — TELEPHONE ENCOUNTER
Patient calls with medication questions       Was asking Effexor is the same as venlafaxine-advised yes     Patient asked about valtrex is 6 tablets a full dose-advised of outbreak take 1 tab times daily for 3 days as directed     Yulissa BUTT RN   [facility]

## 2023-05-09 ENCOUNTER — NURSE TRIAGE (OUTPATIENT)
Dept: FAMILY MEDICINE | Facility: CLINIC | Age: 68
End: 2023-05-09

## 2023-05-09 NOTE — TELEPHONE ENCOUNTER
Attempt # 1    Called #   Telephone Information:   Mobile [phone]       Left a non detailed VM     Madeline Lewis RN, BSN  [facility] Triage

## 2023-05-09 NOTE — TELEPHONE ENCOUNTER
Patient is calling to report that she has developed tremors in her right hand this past month. The tremors do not occur when her hand is at rest, usually they appear when she is eating with a spoon or fork, and writing. Patient is wanting to know if her new medication, effexor, could cause this. Or if any other of her medications or vitamins/supplements can contribute to this tremor. Please advise. Patient would prefer a callback.

## 2023-05-19 NOTE — TELEPHONE ENCOUNTER
"Nurse Triage SBAR    Is this a 2nd Level Triage? NO    Situation: new onset tremor over the last month or so    Background: Started effexor 37.5 mg on 2/2/23 - taking 1 capsule daily     Side effects of Effexor:   Neurologic: Asthenia (12% to 16.9% ), Dizziness (15.8% to 23.9% ), Headache (25% ), Insomnia (13.6% to 22.5% ), Somnolence (15.3% to 26.1% ), Tremor (1.1% to 10.2% )    Assessment: tremor in right when eating with a spoon, \"writing has changed a little bit\"  Denies weakness in that hand, numbness tingling.   Overall mild side effect. Effexor is working well for mood, not necessarily looking to change medication.   Denies: Weakness, vision changes, speech changes, headache, dizziness, seizure.     Protocol Recommended Disposition:   No disposition on file.    Recommendation: Will route to provider to review.      Next 5 appointments (look out 90 days)    Jun 07, 2023  3:00 PM  (Arrive by 2:50 PM)  Annual Wellness Visit with Gabriela Moore MD  [facility] ([facility] ) [address] [place] [id number] [phone]         CARLY ESTEVEZ RN on 5/19/2023 at 10:34 AM   [facility]      Does the patient meet one of the following criteria for ADS visit consideration? 16+ years old, with an [facility] PCP     TIP  Providers, please consider if this condition is appropriate for management at one of our Acute and Diagnostic Services sites.     If patient is a good candidate, please use dotphrase <dot>triageresponse and select Refer to ADS to document.         Answer Assessment - Initial Assessment Questions  1. SYMPTOM: \"What is the main symptom you are concerned about?\" (e.g., weakness, numbness)      Shakiness in right hand when trying to eat with spoon, writing.     2. ONSET: \"When did this start?\" (minutes, hours, days; while sleeping)      Started effexor - 2/2/23  Unsure of onset exactly, note from TC states the " "past month     3. LAST NORMAL: \"When was the last time you (the patient) were normal (no symptoms)?\"      Tremor definitely appeared after starting the effexor    4. PATTERN \"Does this come and go, or has it been constant since it started?\"  \"Is it present now?\"      Only when doing certain things as noted above     5. CARDIAC SYMPTOMS: \"Have you had any of the following symptoms: chest pain, difficulty breathing, palpitations?\"      No    6. NEUROLOGIC SYMPTOMS: \"Have you had any of the following symptoms: headache, dizziness, vision loss, double vision, changes in speech, unsteady on your feet?\"      Normal strength in that hand - no numbness or tingling -   Lifting weights - 5-15 lbs - about a year -     7. OTHER SYMPTOMS: \"Do you have any other symptoms?\"  Denies: Weakness, vision changes, speech changes, headache, dizziness, seizure    8. PREGNANCY: \"Is there any chance you are pregnant?\" \"When was your last menstrual period?\"      n/a    Protocols used: NEUROLOGIC DEFICIT-A-OH      "

## 2023-05-20 NOTE — TELEPHONE ENCOUNTER
As long as no other neurological symptoms occur - No numbness, tingling, or weakness in upper or lower extremities,  No new bowel or bladder control problems, No changes in speech, swallowing, hearing, coordination  or vision, then ok to see me on 6/7/2023.  Please, call our clinic or go to the ER immediately if signs or symptoms worsen or fail to improve as anticipated.     I Sent pt a IPexpert message with the above.

## 2023-06-01 ENCOUNTER — HEALTH MAINTENANCE LETTER (OUTPATIENT)
Age: 68
End: 2023-06-01

## 2023-06-06 ASSESSMENT — PATIENT HEALTH QUESTIONNAIRE - PHQ9: SUM OF ALL RESPONSES TO PHQ QUESTIONS 1-9: 2

## 2023-06-30 ENCOUNTER — TELEPHONE (OUTPATIENT)
Dept: FAMILY MEDICINE | Facility: CLINIC | Age: 68
End: 2023-06-30
Payer: COMMERCIAL

## 2023-06-30 NOTE — TELEPHONE ENCOUNTER
Patient called asking how to get a refill of the Effexor   advised should have one refill on file  90 tabs frilled 3/2/23 and one 90 tab refill-    Advised to call [facility] and have them process a refill     Patient agreeable to plan.     Yulissa BUTT RN   [facility]

## 2023-08-26 ENCOUNTER — HEALTH MAINTENANCE LETTER (OUTPATIENT)
Age: 68
End: 2023-08-26

## 2023-09-10 DIAGNOSIS — F33.41 DEPRESSION, MAJOR, RECURRENT, IN PARTIAL REMISSION (H): Primary | ICD-10-CM

## 2023-09-10 DIAGNOSIS — F33.8 SEASONAL AFFECTIVE DISORDER (H): ICD-10-CM

## 2023-09-10 DIAGNOSIS — F41.9 ANXIETY: ICD-10-CM

## 2023-09-15 RX ORDER — VENLAFAXINE HYDROCHLORIDE 37.5 MG/1
37.5 CAPSULE, EXTENDED RELEASE ORAL DAILY
Qty: 90 CAPSULE | Refills: 3 | Status: SHIPPED | OUTPATIENT
Start: 2023-09-15 | End: 2024-09-09

## 2023-09-25 DIAGNOSIS — E78.5 HYPERLIPIDEMIA LDL GOAL <130: ICD-10-CM

## 2023-09-26 RX ORDER — SIMVASTATIN 40 MG
TABLET ORAL
Qty: 90 TABLET | Refills: 3 | Status: SHIPPED | OUTPATIENT
Start: 2023-09-26 | End: 2024-09-20

## 2023-09-26 ASSESSMENT — ANXIETY QUESTIONNAIRES
3. WORRYING TOO MUCH ABOUT DIFFERENT THINGS: SEVERAL DAYS
1. FEELING NERVOUS, ANXIOUS, OR ON EDGE: SEVERAL DAYS
IF YOU CHECKED OFF ANY PROBLEMS ON THIS QUESTIONNAIRE, HOW DIFFICULT HAVE THESE PROBLEMS MADE IT FOR YOU TO DO YOUR WORK, TAKE CARE OF THINGS AT HOME, OR GET ALONG WITH OTHER PEOPLE: SOMEWHAT DIFFICULT
7. FEELING AFRAID AS IF SOMETHING AWFUL MIGHT HAPPEN: NOT AT ALL
6. BECOMING EASILY ANNOYED OR IRRITABLE: SEVERAL DAYS
5. BEING SO RESTLESS THAT IT IS HARD TO SIT STILL: NOT AT ALL
2. NOT BEING ABLE TO STOP OR CONTROL WORRYING: SEVERAL DAYS
4. TROUBLE RELAXING: NOT AT ALL
GAD7 TOTAL SCORE: 4
GAD7 TOTAL SCORE: 4

## 2023-09-26 ASSESSMENT — ENCOUNTER SYMPTOMS
WEAKNESS: 0
FREQUENCY: 0
HEARTBURN: 0
MYALGIAS: 0
SHORTNESS OF BREATH: 0
NERVOUS/ANXIOUS: 1
JOINT SWELLING: 0
COUGH: 0
DYSURIA: 0
FEVER: 0
HEMATURIA: 0
ARTHRALGIAS: 0
CONSTIPATION: 0
HEMATOCHEZIA: 0
PARESTHESIAS: 0
DIARRHEA: 0
HEADACHES: 0
EYE PAIN: 0
ABDOMINAL PAIN: 0
DIZZINESS: 0
BREAST MASS: 0
SORE THROAT: 0
NAUSEA: 0
PALPITATIONS: 0
CHILLS: 0

## 2023-09-26 ASSESSMENT — ACTIVITIES OF DAILY LIVING (ADL): CURRENT_FUNCTION: NO ASSISTANCE NEEDED

## 2023-09-29 ENCOUNTER — OFFICE VISIT (OUTPATIENT)
Dept: FAMILY MEDICINE | Facility: CLINIC | Age: 68
End: 2023-09-29
Payer: COMMERCIAL

## 2023-09-29 VITALS
BODY MASS INDEX: 27.62 KG/M2 | WEIGHT: 176 LBS | RESPIRATION RATE: 18 BRPM | DIASTOLIC BLOOD PRESSURE: 90 MMHG | SYSTOLIC BLOOD PRESSURE: 118 MMHG | HEART RATE: 88 BPM | OXYGEN SATURATION: 97 % | TEMPERATURE: 98.1 F | HEIGHT: 67 IN

## 2023-09-29 DIAGNOSIS — Z12.11 SCREEN FOR COLON CANCER: ICD-10-CM

## 2023-09-29 DIAGNOSIS — Z00.00 WELCOME TO MEDICARE PREVENTIVE VISIT: Primary | ICD-10-CM

## 2023-09-29 DIAGNOSIS — E04.9 ENLARGED THYROID GLAND: ICD-10-CM

## 2023-09-29 DIAGNOSIS — A60.00 GENITAL HERPES SIMPLEX, UNSPECIFIED SITE: ICD-10-CM

## 2023-09-29 DIAGNOSIS — Z78.0 ASYMPTOMATIC POSTMENOPAUSAL STATUS: ICD-10-CM

## 2023-09-29 DIAGNOSIS — R41.3 MEMORY CHANGES: ICD-10-CM

## 2023-09-29 DIAGNOSIS — Z23 NEEDS FLU SHOT: ICD-10-CM

## 2023-09-29 DIAGNOSIS — E78.5 HYPERLIPIDEMIA LDL GOAL <130: ICD-10-CM

## 2023-09-29 DIAGNOSIS — Z12.31 VISIT FOR SCREENING MAMMOGRAM: ICD-10-CM

## 2023-09-29 DIAGNOSIS — Z00.00 ENCOUNTER FOR MEDICARE ANNUAL WELLNESS EXAM: ICD-10-CM

## 2023-09-29 LAB
ERYTHROCYTE [DISTWIDTH] IN BLOOD BY AUTOMATED COUNT: 13.2 % (ref 10–15)
HCT VFR BLD AUTO: 40.4 % (ref 35–47)
HGB BLD-MCNC: 13.5 G/DL (ref 11.7–15.7)
MCH RBC QN AUTO: 31.5 PG (ref 26.5–33)
MCHC RBC AUTO-ENTMCNC: 33.4 G/DL (ref 31.5–36.5)
MCV RBC AUTO: 94 FL (ref 78–100)
PLATELET # BLD AUTO: 185 10E3/UL (ref 150–450)
RBC # BLD AUTO: 4.28 10E6/UL (ref 3.8–5.2)
WBC # BLD AUTO: 5.7 10E3/UL (ref 4–11)

## 2023-09-29 PROCEDURE — 80053 COMPREHEN METABOLIC PANEL: CPT | Performed by: FAMILY MEDICINE

## 2023-09-29 PROCEDURE — 99214 OFFICE O/P EST MOD 30 MIN: CPT | Mod: 25 | Performed by: FAMILY MEDICINE

## 2023-09-29 PROCEDURE — 82043 UR ALBUMIN QUANTITATIVE: CPT | Performed by: FAMILY MEDICINE

## 2023-09-29 PROCEDURE — 82570 ASSAY OF URINE CREATININE: CPT | Performed by: FAMILY MEDICINE

## 2023-09-29 PROCEDURE — G0008 ADMIN INFLUENZA VIRUS VAC: HCPCS | Performed by: FAMILY MEDICINE

## 2023-09-29 PROCEDURE — 84443 ASSAY THYROID STIM HORMONE: CPT | Performed by: FAMILY MEDICINE

## 2023-09-29 PROCEDURE — 36415 COLL VENOUS BLD VENIPUNCTURE: CPT | Performed by: FAMILY MEDICINE

## 2023-09-29 PROCEDURE — 90662 IIV NO PRSV INCREASED AG IM: CPT | Performed by: FAMILY MEDICINE

## 2023-09-29 PROCEDURE — 80061 LIPID PANEL: CPT | Performed by: FAMILY MEDICINE

## 2023-09-29 PROCEDURE — 85027 COMPLETE CBC AUTOMATED: CPT | Performed by: FAMILY MEDICINE

## 2023-09-29 PROCEDURE — 99397 PER PM REEVAL EST PAT 65+ YR: CPT | Mod: 25 | Performed by: FAMILY MEDICINE

## 2023-09-29 RX ORDER — VALACYCLOVIR HYDROCHLORIDE 500 MG/1
TABLET, FILM COATED ORAL
Qty: 6 TABLET | Refills: 3 | Status: SHIPPED | OUTPATIENT
Start: 2023-09-29 | End: 2024-01-31

## 2023-09-29 ASSESSMENT — ENCOUNTER SYMPTOMS
HEARTBURN: 0
FEVER: 0
NAUSEA: 0
JOINT SWELLING: 0
PARESTHESIAS: 0
PALPITATIONS: 0
HEMATOCHEZIA: 0
EYE PAIN: 0
NERVOUS/ANXIOUS: 1
ABDOMINAL PAIN: 0
SORE THROAT: 0
FREQUENCY: 0
SHORTNESS OF BREATH: 0
CHILLS: 0
COUGH: 0
HEADACHES: 0
HEMATURIA: 0
MYALGIAS: 0
ARTHRALGIAS: 0
DYSURIA: 0
WEAKNESS: 0
CONSTIPATION: 0
DIARRHEA: 0
BREAST MASS: 0
DIZZINESS: 0

## 2023-09-29 ASSESSMENT — ACTIVITIES OF DAILY LIVING (ADL): CURRENT_FUNCTION: NO ASSISTANCE NEEDED

## 2023-09-29 ASSESSMENT — PATIENT HEALTH QUESTIONNAIRE - PHQ9
SUM OF ALL RESPONSES TO PHQ QUESTIONS 1-9: 2
10. IF YOU CHECKED OFF ANY PROBLEMS, HOW DIFFICULT HAVE THESE PROBLEMS MADE IT FOR YOU TO DO YOUR WORK, TAKE CARE OF THINGS AT HOME, OR GET ALONG WITH OTHER PEOPLE: NOT DIFFICULT AT ALL
SUM OF ALL RESPONSES TO PHQ QUESTIONS 1-9: 2

## 2023-09-29 NOTE — PROGRESS NOTES
SUBJECTIVE:   Inessa is a 68 year old who presents for Preventive Visit and the following other medical problems:      1. Welcome to Medicare preventive visit    2. Hyperlipidemia LDL goal <130- needs fasting lipids and LFT's done today - refilled simvastatin this past week    3. Enlarged thyroid gland- mild - will recheck TSH reflex today    4. Needs flu shot    5. Asymptomatic postmenopausal status    6. Genital herpes simplex, unspecified site    7. Screen for colon cancer - next colonoscopy due in 2028    8. Visit for screening mammogram    9. Memory changes- some word finding issues at times - discussed causes: multitasking, poor sleep, saturated fat/inflammatory diet, heredity, & lack of cardiovascular exercise    10. Encounter for Medicare annual wellness exam            9/29/2023     3:14 PM   Additional Questions   Roomed by Doris RYAN       Are you in the first 12 months of your Medicare coverage?  Yes,  Visual Acuity:  Right Eye: 20/50   Left Eye: 20/100  Both Eyes: 20/30    Healthy Habits:     In general, how would you rate your overall health?  Good    Frequency of exercise:  6-7 days/week    Duration of exercise:  30-45 minutes    Taking medications regularly:  Yes    Medication side effects:  None    Ability to successfully perform activities of daily living:  No assistance needed    Home Safety:  No safety concerns identified    Hearing Impairment:  No hearing concerns    In the past 6 months, have you been bothered by leaking of urine? Yes    In general, how would you rate your overall mental or emotional health?  Good    Additional concerns today:  No    Today's PHQ-9 Score:       9/29/2023     2:41 PM   PHQ-9 SCORE   PHQ-9 Total Score MyChart 2 (Minimal depression)   PHQ-9 Total Score 2     Have you ever done Advance Care Planning? (For example, a Health Directive, POLST, or a discussion with a medical provider or your loved ones about your wishes): Yes, patient states has an Advance Care Planning  document and will bring a copy to the clinic.       Fall risk  Fallen 2 or more times in the past year?: No  Any fall with injury in the past year?: Yes    Cognitive Screening   1) Repeat 3 items (Leader, Season, Table)    2) Clock draw: NORMAL  3) 3 item recall: Recalls 3 objects  Results: 3 items recalled: COGNITIVE IMPAIRMENT LESS LIKELY    Mini-CogTM Copyright RADHA Newman. Licensed by the author for use in [facility]; reprinted with permission (chacha@[facility]). All rights reserved.      Do you have sleep apnea, excessive snoring or daytime drowsiness? : yes    Reviewed and updated as needed this visit by clinical staff                  Reviewed and updated as needed this visit by Provider                 Social History     Tobacco Use    Smoking status: Never    Smokeless tobacco: Never   Substance Use Topics    Alcohol use: Yes     Alcohol/week: 0.0 standard drinks of alcohol     Comment: 2 times/month  - 1-2 glasses of wine              9/26/2023     7:06 PM   Alcohol Use   Prescreen: >3 drinks/day or >7 drinks/week? Not Applicable     Do you have a current opioid prescription? No  Do you use any other controlled substances or medications that are not prescribed by a provider? None      Memory issues: having some word finding issues.  Names mostly. Having some difficulty with sleep as well.  Awakens in the mornings feeling rested. Memory changes- some word finding issues at times - discussed causes: multitasking, poor sleep, saturated fat/inflammatory diet, heredity, & lack of cardiovascular exercise   Discussed  also The Alzheimer's Solution: A Breakthrough Program to Prevent and Reverse the Symptoms of Cognitive Decline at Every Age   by Deangelo Caraballo (Author), Lachelle Caraballo (Author) - evidence based medicine researchers - They are two board certified Neurologists out of [place] - book available on Amazon and Audible.      Hyperlipidemia Follow-Up- is fasting today - refilled pt's medication -  Simvastatin.     Are you regularly taking any medication or supplement to lower your cholesterol?   Yes- Simvastatin  Are you having muscle aches or other side effects that you think could be caused by your cholesterol lowering medication?  No    Depression and Anxiety Follow-Up- still doing therapy/counseling as well - now down to every 2-3 weeks.    How are you doing with your depression since your last visit? No change- doing well  How are you doing with your anxiety since your last visit?  No change- doing well  Are you having other symptoms that might be associated with depression or anxiety? Yes:  sleep  Have you had a significant life event? No   Do you have any concerns with your use of alcohol or other drugs? No    Social History     Tobacco Use    Smoking status: Never    Smokeless tobacco: Never   Vaping Use    Vaping Use: Never used   Substance Use Topics    Alcohol use: Yes     Alcohol/week: 0.0 standard drinks of alcohol     Comment: 2 times/month  - 1-2 glasses of wine     Drug use: No     Comment: no herbal meds either          3/2/2023     9:41 AM 6/6/2023    12:28 PM 9/29/2023     2:41 PM   PHQ   PHQ-9 Total Score 5 2    2 2   Q9: Thoughts of better off dead/self-harm past 2 weeks Not at all Not at all    Not at all Not at all         2/2/2023     3:22 PM 3/2/2023     9:43 AM 9/26/2023     7:02 PM   MIGEL-7 SCORE   Total Score 6 (mild anxiety) 4 (minimal anxiety) 4 (minimal anxiety)   Total Score 6 4 4         9/29/2023     2:41 PM   Last PHQ-9   1.  Little interest or pleasure in doing things 0   2.  Feeling down, depressed, or hopeless 0   3.  Trouble falling or staying asleep, or sleeping too much 1   4.  Feeling tired or having little energy 0   5.  Poor appetite or overeating 1   6.  Feeling bad about yourself 0   7.  Trouble concentrating 0   8.  Moving slowly or restless 0   Q9: Thoughts of better off dead/self-harm past 2 weeks 0   PHQ-9 Total Score 2         9/26/2023     7:02 PM   MIGEL-7     1. Feeling nervous, anxious, or on edge 1   2. Not being able to stop or control worrying 1   3. Worrying too much about different things 1   4. Trouble relaxing 0   5. Being so restless that it is hard to sit still 0   6. Becoming easily annoyed or irritable 1   7. Feeling afraid, as if something awful might happen 0   MIGEL-7 Total Score 4   If you checked any problems, how difficult have they made it for you to do your work, take care of things at home, or get along with other people? Somewhat difficult       Suicide Assessment Five-step Evaluation and Treatment (SAFE-T)      Current providers sharing in care for this patient include:   Patient Care Team:  Gabriela Moore MD as PCP - General (Family Practice)  Gabriela Moore MD as Assigned PCP    The following health maintenance items are reviewed in Epic and correct as of today:  Health Maintenance   Topic Date Due    ADVANCE CARE PLANNING  03/01/2023    DEXA  04/02/2023    CMP  05/16/2023    LIPID  05/16/2023    TSH W/FREE T4 REFLEX  05/16/2023    CBC  05/16/2023    MEDICARE ANNUAL WELLNESS VISIT  06/07/2023    ANNUAL REVIEW OF HM ORDERS  06/07/2023    INFLUENZA VACCINE (1) 09/01/2023    COVID-19 Vaccine (5 - 2023-24 season) 09/01/2023    MAMMO SCREENING  08/04/2023    PHQ-9  03/29/2024    MIGEL ASSESSMENT  09/29/2024    FALL RISK ASSESSMENT  09/29/2024    COLORECTAL CANCER SCREENING  08/23/2028    DTAP/TDAP/TD IMMUNIZATION (4 - Td or Tdap) 06/07/2032    HEPATITIS C SCREENING  Completed    DEPRESSION ACTION PLAN  Completed    Pneumococcal Vaccine: 65+ Years  Completed    ZOSTER IMMUNIZATION  Completed    IPV IMMUNIZATION  Aged Out    HPV IMMUNIZATION  Aged Out    MENINGITIS IMMUNIZATION  Aged Out    PAP  Discontinued     Lab work is in process  Labs reviewed in EPIC  BP Readings from Last 3 Encounters:   09/29/23 (!) 118/90   02/02/23 118/78   06/07/22 120/78    Wt Readings from Last 3 Encounters:   09/29/23 79.8 kg (176 lb)   02/02/23 83 kg (183  lb)   06/07/22 77.6 kg (171 lb)                  Patient Active Problem List   Diagnosis    Depression, major, recurrent, in partial remission (H24)    Hyperlipidemia LDL goal <130    Genital herpes simplex, unspecified site    Cystocele    Family history of thyroid disorder- mother and sister    Non morbid obesity due to excess calories    Asymptomatic menopausal state    Urge incontinence of urine    Family history of osteoporosis- mother with signif. 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 depression/anxiety     Numbness in both legs- went away after coming off prenatal vitamin for MTHFR mutation     Hammer toe of second toe of left foot    Enlarged thyroid gland- very mildly enlarged with mildl nodularity  and  firmness     Multiple pigmented nevi- diffuse - with sun-damaged skin     Adenomatous colon polyp-at age 52, then normal colonoscopy at age 55 and then at age 60-  1/2021 normal - repeat 1/2028    Elevated antinuclear antibody (LIZZETTE) level    Methylenetetrahydrofolate reductase deficiency (H24)    Anxiety    Seasonal affective disorder (H24)     Past Surgical History:   Procedure Laterality Date    BREAST BIOPSY, RT/LT Right 2000?     benign    BUNIONECTOMY Left 01/21/2008    Dr. Alberto Laws - left foot medial and lateral with left 2nd toe surgery     COLONOSCOPY  ?    SLING BLADDER SUSPENSION WITH FASCIA SULAIMAN  01/21/2008    - Dr. Emanuel Paz - had DVT afterward     XR ANKLE SURGERY NIXON LEFT  01/21/2008    Dr. Alberto Laws -arthroscopy ankle s/p fall - not fused        Social History     Tobacco Use    Smoking status: Never    Smokeless tobacco: Never    Tobacco comments:     None   Substance Use Topics    Alcohol use: Yes     Comment: Very limited     Family History   Problem Relation Age of Onset    Cerebrovascular Disease  Mother 88        tiny strokes - TIA -  at age 90    Hypertension Mother     Osteoporosis Mother     Alzheimer Disease Father 81         age 89    Heart Disease Father 63        s/p MI - first at age 63 , then s/p 4 vessel CABG    Depression Father     Bipolar Disorder Sister         bipolar disorder -with heroin addiction - on methadone - no contact for years     Dementia Sister         ? is homeless - so unsure of the dementia    Substance Abuse Sister         heroin    Bipolar Disorder Sister         bipolar - 2 with hoarding tendencies, psych haluciantions,      Psychosis Sister     Mental Illness Sister     Dementia Sister     Mental Illness Sister         hoarding tendencies    Psychosis Sister     Substance Abuse Sister         Opiates     Mental Illness Sister     Thyroid Disease Sister     Mental Illness Brother          after fall while drunk - alcoholic     Aortic dissection Brother 65        dissecting thoracic aortic aneurysm     Hepatitis Brother         Hep C - treated    Breast Cancer Maternal Grandmother     Attention Deficit Disorder Daughter     Substance Abuse Daughter         adderall that was not prescribed    Colon Cancer No family hx of          Current Outpatient Medications   Medication Sig Dispense Refill    calcium carbonate-vitamin D 600-200 MG-UNIT TABS Take 1 tablet by mouth      folic acid (FOLVITE) 1 MG tablet Take 1 tablet (1 mg) by mouth daily      Multiple Vitamins-Minerals (MULTIVITAMIN WOMEN PO)       omega 3 1000 MG CAPS Take 2 g by mouth daily 180 capsule 1    simvastatin (ZOCOR) 40 MG tablet TAKE 1 TABLET BY MOUTH IN  THE EVENING 90 tablet 3    valACYclovir (VALTREX) 500 MG tablet 1 tab twice daily for 3 days per occurrence - use at first sign of return lesions 6 tablet 3    venlafaxine (EFFEXOR XR) 37.5 MG 24 hr capsule TAKE 1 CAPSULE DAILY 90 capsule 3     Allergies   Allergen Reactions    Sulfa Antibiotics      rash     Recent Labs   Lab Test 23  0466  05/16/22  0838 06/02/21  1651 09/09/19  1504 08/09/18  1425 03/01/18  1042 01/09/17  1127   A1C  --   --   --   --   --  5.3 5.3   * 93 131* 95   < > 107* 98   HDL 75 67 82 67   < > 60 60   TRIG 103 176* 91 82   < > 106 227*   ALT 12 17 25 19   < > 17 20   CR 0.90 0.91 0.90 0.83   < > 0.78 0.73   GFRESTIMATED 70 70 67 76   < > 75 81   GFRESTBLACK  --   --  78 88   < > >90 >90  African American GFR Calc     POTASSIUM 4.6 4.2 4.2 3.9   < > 4.4 4.1   TSH 0.93 2.37 1.50 1.42   < > 2.18 1.89    < > = values in this interval not displayed.          Mammogram Screening: Mammogram Screening: Recommended mammography every 1-2 years with patient discussion and risk factor consideration  History of abnormal Pap smear: No   Last 3 Pap Results:   PAP (no units)   Date Value   06/02/2021 NIL   03/01/2018 NIL   11/17/2015 NIL       FHS-7:       6/2/2021     4:10 PM 6/7/2022     2:16 PM 6/1/2023     9:46 AM 9/26/2023     7:06 PM   Breast CA Risk Assessment (FHS-7)   Did any of your first-degree relatives have breast or ovarian cancer? Yes No No    No No   Did any of your relatives have bilateral breast cancer? Unknown Unknown Unknown    Unknown Unknown   Did any man in your family have breast cancer? Unknown No No    No No   Did any woman in your family have breast and ovarian cancer? No No Unknown    Unknown Unknown   Did any woman in your family have breast cancer before age 50 y? No No Unknown    Unknown Unknown   Do you have 2 or more relatives with breast and/or ovarian cancer? No Unknown No    No No   Do you have 2 or more relatives with breast and/or bowel cancer? No Unknown No    No No     click delete button to remove this line now    Pertinent mammograms are reviewed under the imaging tab.    Review of Systems   Constitutional:  Negative for chills and fever.   HENT:  Negative for congestion, ear pain, hearing loss and sore throat.    Eyes:  Negative for pain and visual disturbance.   Respiratory:  Negative for  "cough and shortness of breath.    Cardiovascular:  Negative for chest pain, palpitations and peripheral edema.   Gastrointestinal:  Negative for abdominal pain, constipation, diarrhea, heartburn, hematochezia and nausea.   Breasts:  Negative for tenderness, breast mass and discharge.   Genitourinary:  Negative for dysuria, frequency, hematuria, pelvic pain, vaginal bleeding and vaginal discharge.   Musculoskeletal:  Negative for arthralgias, joint swelling and myalgias.   Skin:  Negative for rash.   Neurological:  Negative for dizziness, weakness, headaches and paresthesias.   Psychiatric/Behavioral:  Positive for mood changes. The patient is nervous/anxious.          OBJECTIVE:   BP (!) 118/90   Pulse 88   Temp 98.1  F (36.7  C)   Resp 18   Ht 1.7 m (5' 6.93\")   Wt 79.8 kg (176 lb)   SpO2 97%   BMI 27.62 kg/m   Estimated body mass index is 28.66 kg/m  as calculated from the following:    Height as of 2/2/23: 1.702 m (5' 7\").    Weight as of 2/2/23: 83 kg (183 lb).  Physical Exam  GENERAL APPEARANCE: healthy, alert and no distress  EYES: Eyes grossly normal to inspection, PERRL and conjunctivae and sclerae normal  HENT: ear canals and TM's normal, nose and mouth without ulcers or lesions, oropharynx clear and oral mucous membranes moist  NECK: no adenopathy, no asymmetry, masses, or scars and thyroid mildly enlarged , but otherwise normal to palpation  RESP: lungs clear to auscultation - no rales, rhonchi or wheezes  BREAST: normal without masses, tenderness or nipple discharge and no palpable axillary masses or adenopathy  CV: regular rate and rhythm, normal S1 S2, no S3 or S4, no murmur, click or rub, no peripheral edema and peripheral pulses strong  ABDOMEN: soft, nontender, no hepatosplenomegaly, no masses and bowel sounds normal  MS: no musculoskeletal defects are noted and gait is age appropriate without ataxia  SKIN: no suspicious lesions or rashes  NEURO: Normal strength and tone, sensory exam grossly " "normal, mentation intact and speech normal  PSYCH: mentation appears normal and affect normal/bright    Diagnostic Test Results:  Labs reviewed in Epic    ASSESSMENT / PLAN:       ICD-10-CM    1. Welcome to Medicare preventive visit  Z00.00       2. Hyperlipidemia LDL goal <130- needs fasting lipids and LFT's done today - refilled simvastatin this past week  E78.5 COMPREHENSIVE METABOLIC PANEL     CBC with Platelets     REVIEW OF HEALTH MAINTENANCE PROTOCOL ORDERS     Lipid panel reflex to direct LDL Fasting     Albumin Random Urine Quantitative with Creat Ratio     COMPREHENSIVE METABOLIC PANEL     CBC with Platelets     Lipid panel reflex to direct LDL Fasting     Albumin Random Urine Quantitative with Creat Ratio      3. Enlarged thyroid gland- mild - will recheck TSH reflex today  E04.9 TSH WITH FREE T4 REFLEX     TSH WITH FREE T4 REFLEX      4. Needs flu shot  Z23       5. Asymptomatic postmenopausal status  Z78.0 DEXA HIP/PELVIS/SPINE - Future      6. Genital herpes simplex, unspecified site  A60.00 valACYclovir (VALTREX) 500 MG tablet      7. Screen for colon cancer - next colonoscopy due in 2028  Z12.11       8. Visit for screening mammogram  Z12.31 MA Screening Bilateral w/ Mikey      9. Memory changes- some word finding issues at times - discussed causes: multitasking, poor sleep, saturated fat/inflammatory diet, heredity, & lack of cardiovascular exercise  R41.3       10. Encounter for Medicare annual wellness exam  Z00.00             Patient has been advised of split billing requirements and indicates understanding: Yes      COUNSELING:  Reviewed preventive health counseling, as reflected in patient instructions      BMI:   Estimated body mass index is 28.66 kg/m  as calculated from the following:    Height as of 2/2/23: 1.702 m (5' 7\").    Weight as of 2/2/23: 83 kg (183 lb).         She reports that she has never smoked. She has never used smokeless tobacco.      Appropriate preventive services were " discussed with this patient, including applicable screening as appropriate for fall prevention, nutrition, physical activity, Tobacco-use cessation, weight loss and cognition.  Checklist reviewing preventive services available has been given to the patient.    Reviewed patients plan of care and provided an AVS. The Complex Care Plan (for patients with higher acuity and needing more deliberate coordination of services) for Inessa meets the Care Plan requirement. This Care Plan has been established and reviewed with the Patient.    Return in about 6 months (around 3/29/2024) for depression, anxiety, cholesterol, as a video visit and fasting labs for cholesterol = lab only appt.                Gabriela Moore MD  [facility] [place]    Identified Health Risks:  I have reviewed Opioid Use Disorder and Substance Use Disorder risk factors and made any needed referrals. Information on urinary incontinence and treatment options given to patient.Information on urinary incontinence and treatment options given to patient.

## 2023-09-29 NOTE — PATIENT INSTRUCTIONS
[facility]  [address] [place] [id number]  Office: [phone]   Fax:    [phone]     Discussed need for resistance training to help keep bones strong and decrease age -related muscle loss, decreasing insulin resistance and increasing basal metabolic rate to help burn more calories at rest and with exertion.     The Alzheimer's Solution: A Breakthrough Program to Prevent and Reverse the Symptoms of Cognitive Decline at Every Age   by Deangelo Caraballo (Author), Lachelle Caraballo (Author) - evidence based medicine researchers - They are two board certified Neurologists out of [place] - book available on Amazon and [employer] ([place]).      Memory changes- some word finding issues at times - discussed causes: multitasking, poor sleep, saturated fat/inflammatory diet, heredity, & lack of cardiovascular exercise .      Return in about 6 months (around 3/29/2024) for depression, anxiety, cholesterol, as a video visit and fasting labs for cholesterol = lab only appt.         Patient Education   Personalized Prevention Plan  You are due for the preventive services outlined below.  Your care team is available to assist you in scheduling these services.  If you have already completed any of these items, please share that information with your care team to update in your medical record.  Health Maintenance Due   Topic Date Due    Osteoporosis Screening  04/02/2023    Comprehensive Metabolic Panel  05/16/2023    Cholesterol Lab  05/16/2023    Thyroid Function Lab  05/16/2023    Annual Wellness Visit  06/07/2023    COVID-19 Vaccine (5 - 2023-24 season) 09/01/2023    Mammogram  08/04/2023     Bladder Training: Care Instructions  Your Care Instructions     Bladder training is used to treat urge incontinence and stress incontinence. Urge incontinence means that the need to urinate comes on so fast that you can't get to a toilet in time. Stress incontinence means that you leak urine because of pressure on  your bladder. For example, it may happen when you laugh, cough, or lift something heavy.  Bladder training can increase how long you can wait before you have to urinate. It can also help your bladder hold more urine. And it can give you better control over the urge to urinate.  It is important to remember that bladder training takes a few weeks to a few months to make a difference. You may not see results right away, but don't give up.  Follow-up care is a key part of your treatment and safety. Be sure to make and go to all appointments, and call your doctor if you are having problems. It's also a good idea to know your test results and keep a list of the medicines you take.  How can you care for yourself at home?  Work with your doctor to come up with a bladder training program that is right for you. You may use one or more of the following methods.  Delayed urination  In the beginning, try to keep from urinating for 5 minutes after you first feel the need to go.  While you wait, take deep, slow breaths to relax. Kegel exercises can also help you delay the need to go to the bathroom.  After some practice, when you can easily wait 5 minutes to urinate, try to wait 10 minutes before you urinate.  Slowly increase the waiting period until you are able to control when you have to urinate.  Scheduled urination  Empty your bladder when you first wake up in the morning.  Schedule times throughout the day when you will urinate.  Start by going to the bathroom every hour, even if you don't need to go.  Slowly increase the time between trips to the bathroom.  When you have found a schedule that works well for you, keep doing it.  If you wake up during the night and have to urinate, do it. Apply your schedule to waking hours only.  Kegel exercises  These tighten and strengthen pelvic muscles, which can help you control the flow of urine. (If doing these exercises causes pain, stop doing them and talk with your doctor.) To do  "Kegel exercises:  Squeeze your muscles as if you were trying not to pass gas. Or squeeze your muscles as if you were stopping the flow of urine. Your belly, legs, and buttocks shouldn't move.  Hold the squeeze for 3 seconds, then relax for 5 to 10 seconds.  Start with 3 seconds, then add 1 second each week until you are able to squeeze for 10 seconds.  Repeat the exercise 10 times a session. Do 3 to 8 sessions a day.  When should you call for help?  Watch closely for changes in your health, and be sure to contact your doctor if:    Your incontinence is getting worse.     You do not get better as expected.   Where can you learn more?  Go to https://www.[employer].net/patiented  Enter V684 in the search box to learn more about \"Bladder Training: Care Instructions.\"  Current as of: March 1, 2023               Content Version: 13.7    7617-3595 [employer].   Care instructions adapted under license by your healthcare professional. If you have questions about a medical condition or this instruction, always ask your healthcare professional. [employer] disclaims any warranty or liability for your use of this information.      Kegel Exercises: Care Instructions  Overview     Kegel exercises strengthen muscles around the bladder. These muscles control the flow of urine. Kegel exercises are sometime called \"pelvic floor\" exercises. They can help prevent urine leakage and keep the pelvic organs in place.  Kegel exercises can strengthen pelvic muscles that have been weakened by age, pregnancy, childbirth, and surgery. They may help prevent or treat urine leakage.  You do Kegel exercises by squeezing your pelvic floor muscles. You will likely need to do these exercises for several weeks to get better.  Follow-up care is a key part of your treatment and safety. Be sure to make and go to all appointments, and call your doctor if you are having problems. It's also a good idea to know your test results and " "keep a list of the medicines you take.  How can you care for yourself at home?  To do Kegel exercises:  Squeeze your muscles as if you were trying not to pass gas. Or squeeze your muscles as if you were stopping the flow of urine. Your belly, legs, and buttocks shouldn't move.  Hold the squeeze for 3 seconds, then relax for 5 to 10 seconds.  Start with 3 seconds, then add 1 second each week until you are able to squeeze for 10 seconds.  Repeat the exercise 10 times a session. Do 3 to 8 sessions a day.  When learning what muscles to squeeze, you can try stopping the flow of urine a few times. But don't make it a practice to do Kegels while urinating.  If doing these exercises causes pain, stop doing them and talk with your doctor. Sometimes people have pelvic floor muscles that are too tight. In these cases, doing Kegel exercises may cause more problems.  Check with your doctor if you don't notice a difference after trying these exercises for several weeks. Your doctor may suggest getting help from a physical therapist or recommend other treatment.  Where can you learn more?  Go to https://www.[employer].net/patiented  Enter Q681 in the search box to learn more about \"Kegel Exercises: Care Instructions.\"  Current as of: March 1, 2023               Content Version: 13.7    9557-4609 [employer].   Care instructions adapted under license by your healthcare professional. If you have questions about a medical condition or this instruction, always ask your healthcare professional. [employer] disclaims any warranty or liability for your use of this information.         Patient Education   Personalized Prevention Plan  You are due for the preventive services outlined below.  Your care team is available to assist you in scheduling these services.  If you have already completed any of these items, please share that information with your care team to update in your medical record.  Health Maintenance " Due   Topic Date Due    Osteoporosis Screening  04/02/2023    Comprehensive Metabolic Panel  05/16/2023    Cholesterol Lab  05/16/2023    Thyroid Function Lab  05/16/2023    Annual Wellness Visit  06/07/2023    COVID-19 Vaccine (5 - 2023-24 season) 09/01/2023    Mammogram  08/04/2023     Bladder Training: Care Instructions  Your Care Instructions     Bladder training is used to treat urge incontinence and stress incontinence. Urge incontinence means that the need to urinate comes on so fast that you can't get to a toilet in time. Stress incontinence means that you leak urine because of pressure on your bladder. For example, it may happen when you laugh, cough, or lift something heavy.  Bladder training can increase how long you can wait before you have to urinate. It can also help your bladder hold more urine. And it can give you better control over the urge to urinate.  It is important to remember that bladder training takes a few weeks to a few months to make a difference. You may not see results right away, but don't give up.  Follow-up care is a key part of your treatment and safety. Be sure to make and go to all appointments, and call your doctor if you are having problems. It's also a good idea to know your test results and keep a list of the medicines you take.  How can you care for yourself at home?  Work with your doctor to come up with a bladder training program that is right for you. You may use one or more of the following methods.  Delayed urination  In the beginning, try to keep from urinating for 5 minutes after you first feel the need to go.  While you wait, take deep, slow breaths to relax. Kegel exercises can also help you delay the need to go to the bathroom.  After some practice, when you can easily wait 5 minutes to urinate, try to wait 10 minutes before you urinate.  Slowly increase the waiting period until you are able to control when you have to urinate.  Scheduled urination  Empty your  "bladder when you first wake up in the morning.  Schedule times throughout the day when you will urinate.  Start by going to the bathroom every hour, even if you don't need to go.  Slowly increase the time between trips to the bathroom.  When you have found a schedule that works well for you, keep doing it.  If you wake up during the night and have to urinate, do it. Apply your schedule to waking hours only.  Kegel exercises  These tighten and strengthen pelvic muscles, which can help you control the flow of urine. (If doing these exercises causes pain, stop doing them and talk with your doctor.) To do Kegel exercises:  Squeeze your muscles as if you were trying not to pass gas. Or squeeze your muscles as if you were stopping the flow of urine. Your belly, legs, and buttocks shouldn't move.  Hold the squeeze for 3 seconds, then relax for 5 to 10 seconds.  Start with 3 seconds, then add 1 second each week until you are able to squeeze for 10 seconds.  Repeat the exercise 10 times a session. Do 3 to 8 sessions a day.  When should you call for help?  Watch closely for changes in your health, and be sure to contact your doctor if:    Your incontinence is getting worse.     You do not get better as expected.   Where can you learn more?  Go to https://www.[employer].net/patiented  Enter V684 in the search box to learn more about \"Bladder Training: Care Instructions.\"  Current as of: March 1, 2023               Content Version: 13.7    7265-8447 [employer].   Care instructions adapted under license by your healthcare professional. If you have questions about a medical condition or this instruction, always ask your healthcare professional. [employer] disclaims any warranty or liability for your use of this information.    Thank you so much or choosing [facility]  for your Health Care. It was a pleasure seeing you at your visit today! Please contact us with any questions " "or concerns you may have.                   Gabriela Moore MD                              To reach your [facility] care team after hours call:   [phone] press #2 \"to speak with your care team\".  This will get you to our clinic instead of routing to central [facility]  scheduling.     PLEASE NOTE OUR HOURS HAVE CHANGED secondary to COVID-19 coronavirus pandemic, as we are trying to minimize patient exposure to the virus,  which is now widespread in the [facility].  These hours may change with very little notice.  We apologize for any inconvenience.       Our current clinic hours are:          Monday- Thursday   7:00am - 6:00pm  in person.      Friday  7:00am- 5:00pm                       Saturday and Sunday : Closed to in person and virtual visits        We have telephone and virtual visit times available between    7:00am - 6pm on Monday-Friday as well.                                                Phone:  [phone]      Our pharmacy hours: Monday through Friday 8:00am to 5:00pm                        Saturday - 9:00 am to 12 noon       Sunday : Closed.              Phone:  [phone]              ###  Please note: at this time we are not accepting any walk-in visits. ###      There is also information available at our web site:  [email or web address]    If your provider ordered any lab tests and you do not receive the results within 10 business days, please call the clinic.    If you need a medication refill please contact your pharmacy.  Please allow 3 business days for your refill to be completed.    Our clinic offers telephone visits and e visits.  Please ask one of your team members to explain more.      Use [employer] (secure email communication and access to your chart) to send your primary care provider a message or make an appointment. Ask someone on your Team how to sign up for [employer]-Qit.                 "

## 2023-10-02 LAB
ALBUMIN SERPL BCG-MCNC: 4.5 G/DL (ref 3.5–5.2)
ALP SERPL-CCNC: 59 U/L (ref 35–104)
ALT SERPL W P-5'-P-CCNC: 12 U/L (ref 0–50)
ANION GAP SERPL CALCULATED.3IONS-SCNC: 15 MMOL/L (ref 7–15)
AST SERPL W P-5'-P-CCNC: 22 U/L (ref 0–45)
BILIRUB SERPL-MCNC: 0.6 MG/DL
BUN SERPL-MCNC: 22.6 MG/DL (ref 8–23)
CALCIUM SERPL-MCNC: 10 MG/DL (ref 8.8–10.2)
CHLORIDE SERPL-SCNC: 102 MMOL/L (ref 98–107)
CHOLEST SERPL-MCNC: 199 MG/DL
CREAT SERPL-MCNC: 0.9 MG/DL (ref 0.51–0.95)
CREAT UR-MCNC: 250 MG/DL
DEPRECATED HCO3 PLAS-SCNC: 24 MMOL/L (ref 22–29)
EGFRCR SERPLBLD CKD-EPI 2021: 70 ML/MIN/1.73M2
GLUCOSE SERPL-MCNC: 85 MG/DL (ref 70–99)
HDLC SERPL-MCNC: 75 MG/DL
LDLC SERPL CALC-MCNC: 103 MG/DL
MICROALBUMIN UR-MCNC: <12 MG/L
MICROALBUMIN/CREAT UR: NORMAL MG/G{CREAT}
NONHDLC SERPL-MCNC: 124 MG/DL
POTASSIUM SERPL-SCNC: 4.6 MMOL/L (ref 3.4–5.3)
PROT SERPL-MCNC: 7.2 G/DL (ref 6.4–8.3)
SODIUM SERPL-SCNC: 141 MMOL/L (ref 135–145)
TRIGL SERPL-MCNC: 103 MG/DL
TSH SERPL DL<=0.005 MIU/L-ACNC: 0.93 UIU/ML (ref 0.3–4.2)

## 2023-11-04 ENCOUNTER — HEALTH MAINTENANCE LETTER (OUTPATIENT)
Age: 68
End: 2023-11-04

## 2023-11-13 ENCOUNTER — OFFICE VISIT (OUTPATIENT)
Dept: FAMILY MEDICINE | Facility: CLINIC | Age: 68
End: 2023-11-13
Payer: COMMERCIAL

## 2023-11-13 VITALS
HEIGHT: 66 IN | SYSTOLIC BLOOD PRESSURE: 118 MMHG | RESPIRATION RATE: 12 BRPM | BODY MASS INDEX: 28.93 KG/M2 | TEMPERATURE: 98.8 F | WEIGHT: 180 LBS | OXYGEN SATURATION: 97 % | HEART RATE: 79 BPM | DIASTOLIC BLOOD PRESSURE: 78 MMHG

## 2023-11-13 DIAGNOSIS — H92.02 LEFT EAR PAIN: Primary | ICD-10-CM

## 2023-11-13 PROCEDURE — 99213 OFFICE O/P EST LOW 20 MIN: CPT | Performed by: FAMILY MEDICINE

## 2023-11-13 RX ORDER — RESPIRATORY SYNCYTIAL VIRUS VACCINE 120MCG/0.5
0.5 KIT INTRAMUSCULAR ONCE
Qty: 1 EACH | Refills: 0 | Status: CANCELLED | OUTPATIENT
Start: 2023-11-13 | End: 2023-11-13

## 2023-11-13 RX ORDER — AMOXICILLIN 875 MG
875 TABLET ORAL 2 TIMES DAILY
Qty: 14 TABLET | Refills: 0 | Status: SHIPPED | OUTPATIENT
Start: 2023-11-13 | End: 2023-11-20

## 2023-11-13 ASSESSMENT — PATIENT HEALTH QUESTIONNAIRE - PHQ9
10. IF YOU CHECKED OFF ANY PROBLEMS, HOW DIFFICULT HAVE THESE PROBLEMS MADE IT FOR YOU TO DO YOUR WORK, TAKE CARE OF THINGS AT HOME, OR GET ALONG WITH OTHER PEOPLE: SOMEWHAT DIFFICULT
SUM OF ALL RESPONSES TO PHQ QUESTIONS 1-9: 7
SUM OF ALL RESPONSES TO PHQ QUESTIONS 1-9: 7

## 2023-11-13 ASSESSMENT — PAIN SCALES - GENERAL: PAINLEVEL: MILD PAIN (2)

## 2023-11-13 NOTE — PROGRESS NOTES
"  Assessment & Plan   Left ear pain  Signs of bulge of the ear but no effusion, no fever and pain is better just some plugging at the moment.  Discussed taking antibiotics versus observation and she would like to observe which I think is a good idea.  Condition a prescription given to fill if symptoms worsen or develops fever.  - amoxicillin (AMOXIL) 875 MG tablet  Dispense: 14 tablet; Refill: 0              Return in about 1 week (around 11/20/2023) for symptoms failing to improve or sooner if worsening.          Fareed Posey MD      [address] [address]  [email or web address]   Office: [phone]       Subjective   Inessa is a 68 year old, presenting for the following health issues:  Ear Problem        Ear Problem  This is a new problem. The current episode started more than 2 days ago. There is pain in the left ear. The problem has been gradually improving. There has been no fever.    At home Covid test was neg     Acute Illness    Symptoms:  Fever: No  Chills/Sweats: YES  Headache (location?): YES  Sinus Pressure: No  Conjunctivitis:  No  Ear Pain: YES: left  Rhinorrhea: No  Congestion: No  Sore Throat: off and on worse in morning  Cough: YES-non-productive  Wheeze: No  Decreased Appetite: No  Nausea: No  Vomiting: No  Diarrhea: YES  Fatigue/Achiness: YES  Sick/Strep Exposure: No  Therapies tried and outcome: night quil     Review of Systems   HENT:  Positive for ear pain.           Objective    /78   Pulse 79   Temp 98.8  F (37.1  C) (Tympanic)   Resp 12   Ht 1.676 m (5' 6\")   Wt 81.6 kg (180 lb)   SpO2 97%   BMI 29.05 kg/m    Body mass index is 29.05 kg/m .  Physical Exam   GENERAL: no acute distress  EYES: Conjunctiva are not injected, no discharge.  EARS: Left TM - erythema, no effusion,  slight bulged.               Right TM -no erythema, no effusion,  not bulged.  NOSE: no discharge, no sinus tenderness  THROAT: no erythema, no exudate, no " lesions  NECK: supple, no adenopathy.  CARDIAC: regular rate and rhythm, no murmur  RESP: clear, no wheezing, no rales, no rhonchi  ABD: soft, no distension, no tenderness  SKIN: No rashes

## 2023-11-29 ENCOUNTER — TELEPHONE (OUTPATIENT)
Dept: FAMILY MEDICINE | Facility: CLINIC | Age: 68
End: 2023-11-29
Payer: COMMERCIAL

## 2023-11-29 DIAGNOSIS — H92.02 LEFT EAR PAIN: Primary | ICD-10-CM

## 2023-11-29 NOTE — TELEPHONE ENCOUNTER
Patient finished the course of amox and isnt any better still cannot hear.    Is there a different antibotic she might try.    [facility]

## 2023-11-30 NOTE — TELEPHONE ENCOUNTER
Called patient and advised of providers note/rx     Advised to stop  current antibiotic and new one as directed.     Patient states understanding

## 2024-01-08 NOTE — RESULT ENCOUNTER NOTE
Dear Inessa,     I was looking over some previous lab results and noted that I hadn't sent you a note on them as yet.     Thank you for your patience in getting my comments on your recent results to you.  My sincerest apologies in not getting these to you sooner.     All your last labs that I ordered are normal, improved, or pretty stable from previous.     Please, continue your current medications and/or supplements and follow up as we discussed at your last visit.     For additional lab test information, [email or web address].org is an excellent reference.    Thank you so much for choosing [facility].  Please contact us with any questions that you may have.   We appreciate the opportunity to serve you now and look forward to supporting your healthcare needs for a long time to come!    Most Sincerely,     Gabriela Moore MD

## 2024-01-25 ENCOUNTER — PATIENT OUTREACH (OUTPATIENT)
Dept: GASTROENTEROLOGY | Facility: CLINIC | Age: 69
End: 2024-01-25
Payer: COMMERCIAL

## 2024-01-31 DIAGNOSIS — A60.00 GENITAL HERPES SIMPLEX, UNSPECIFIED SITE: ICD-10-CM

## 2024-01-31 RX ORDER — VALACYCLOVIR HYDROCHLORIDE 500 MG/1
TABLET, FILM COATED ORAL
Qty: 6 TABLET | Refills: 1 | Status: SHIPPED | OUTPATIENT
Start: 2024-01-31

## 2024-01-31 NOTE — TELEPHONE ENCOUNTER
Medication Question or Refill        What medication are you calling about (include dose and sig)?: valACYclovir (VALTREX) 500 MG tablet     Preferred Pharmacy:   [facility] [place], [place] - [address] [facility]  [address] [place] [address]  Phone: [phone] Fax: [phone] Alternate Fax: [phone], [phone]        Controlled Substance Agreement on file:   CSA -- Patient Level:    CSA: None found at the patient level.       Who prescribed the medication?: Dr. Moore    Do you need a refill? Yes    When did you use the medication last? PRN

## 2024-03-18 ENCOUNTER — E-VISIT (OUTPATIENT)
Dept: FAMILY MEDICINE | Facility: CLINIC | Age: 69
End: 2024-03-18
Payer: COMMERCIAL

## 2024-03-18 DIAGNOSIS — R30.0 DIFFICULT OR PAINFUL URINATION: Primary | ICD-10-CM

## 2024-03-18 PROCEDURE — 99421 OL DIG E/M SVC 5-10 MIN: CPT | Performed by: PHYSICIAN ASSISTANT

## 2024-03-18 RX ORDER — CEPHALEXIN 500 MG/1
500 CAPSULE ORAL 2 TIMES DAILY
Qty: 10 CAPSULE | Refills: 0 | Status: SHIPPED | OUTPATIENT
Start: 2024-03-18 | End: 2024-03-23

## 2024-03-18 NOTE — PATIENT INSTRUCTIONS
Dear Inessa Castaneda,     After reviewing your responses, I would like you to come in for a urine test to make sure we treat you correctly. This urine test is to evaluate you for a possible urinary tract infection, and should be scheduled for today or tomorrow. Schedule a Lab Only appointment here.      Lab appointments are not available at most locations on the weekends. If no Lab Only appointment is available, you should be seen in any of our convenient Walk-in or Urgent Care Centers, which can be found on our website here.    I will also send an antibiotic to your pharmacy, however, please do not start this until after you have left the urine sample at the lab to avoid inaccurate results.  You can start this antibiotic immediately after you have left the specimen.     You will receive instructions with your results in [employer] once they are available.     If your symptoms worsen, you develop pain in your back or stomach, develop fevers, or are not improving in 5 days, please contact your primary care provider for an appointment or visit a Walk-in or Urgent Care Center to be seen.     Thanks again for choosing us as your health care partner,       Doris Cruz MBA, MS, PA-C  [facility]  (covering for Dr. Moore)  Urinary Tract Infection (UTI) in Women: Care Instructions  Overview     A urinary tract infection (UTI) is an infection caused by bacteria. It can happen anywhere in the urinary tract. A UTI can happen in the:  Kidneys.  Ureters, the tubes that connect the kidneys to the bladder.  Bladder.  Urethra, where the urine comes out.  Most UTIs are bladder infections. They often cause pain or burning when you urinate.  Most UTIs can be cured with antibiotics. If you are prescribed antibiotics, be sure to complete your treatment so that the infection does not get worse.  Follow-up care is a key part of your treatment and safety. Be sure to make and go to all appointments, and call  your doctor if you are having problems. It's also a good idea to know your test results and keep a list of the medicines you take.  How can you care for yourself at home?  Take your antibiotics as directed. Do not stop taking them just because you feel better. You need to take the full course of antibiotics.  Drink extra water and other fluids for the next day or two. This will help make the urine less concentrated and help wash out the bacteria that are causing the infection. (If you have kidney, heart, or liver disease and have to limit fluids, talk with your doctor before you increase the amount of fluids you drink.)  Avoid drinks that are carbonated or have caffeine. They can irritate the bladder.  Urinate often. Try to empty your bladder each time.  To relieve pain, take a hot bath or lay a heating pad set on low over your lower belly or genital area. Never go to sleep with a heating pad in place.  To prevent UTIs  Drink plenty of water each day. This helps you urinate often, which clears bacteria from your system. (If you have kidney, heart, or liver disease and have to limit fluids, talk with your doctor before you increase the amount of fluids you drink.)  Urinate when you need to.  If you are sexually active, urinate right after you have sex.  Change sanitary pads often.  Avoid douches, bubble baths, feminine hygiene sprays, and other feminine hygiene products that have deodorants.  After going to the bathroom, wipe from front to back.  When should you call for help?   Call your doctor now or seek immediate medical care if:    You have new or worse fever, chills, nausea, or vomiting.     You have new pain in your back just below your rib cage. This is called flank pain.     There is new blood or pus in your urine.     You have any problems with your antibiotic medicine.   Watch closely for changes in your health, and be sure to contact your doctor if:    You are not getting better after taking an antibiotic  "for 2 days.     Your symptoms go away but then come back.   Where can you learn more?  Go to https://[email or web address].net/patiented  Enter K848 in the search box to learn more about \"Urinary Tract Infection (UTI) in Women: Care Instructions.\"  Current as of: November 15, 2023               Content Version: 14.0    6462-3753 [employer].   Care instructions adapted under license by your healthcare professional. If you have questions about a medical condition or this instruction, always ask your healthcare professional. [employer] disclaims any warranty or liability for your use of this information.      "

## 2024-03-19 ENCOUNTER — LAB (OUTPATIENT)
Dept: LAB | Facility: CLINIC | Age: 69
End: 2024-03-19
Payer: COMMERCIAL

## 2024-03-19 DIAGNOSIS — R30.0 DIFFICULT OR PAINFUL URINATION: ICD-10-CM

## 2024-03-19 LAB
ALBUMIN UR-MCNC: NEGATIVE MG/DL
APPEARANCE UR: CLEAR
BACTERIA #/AREA URNS HPF: ABNORMAL /HPF
BILIRUB UR QL STRIP: NEGATIVE
COLOR UR AUTO: YELLOW
GLUCOSE UR STRIP-MCNC: NEGATIVE MG/DL
HGB UR QL STRIP: NEGATIVE
KETONES UR STRIP-MCNC: NEGATIVE MG/DL
LEUKOCYTE ESTERASE UR QL STRIP: NEGATIVE
NITRATE UR QL: NEGATIVE
PH UR STRIP: 7 [PH] (ref 5–7)
RBC #/AREA URNS AUTO: ABNORMAL /HPF
SP GR UR STRIP: 1.01 (ref 1–1.03)
SQUAMOUS #/AREA URNS AUTO: ABNORMAL /LPF
UROBILINOGEN UR STRIP-ACNC: 0.2 E.U./DL
WBC #/AREA URNS AUTO: ABNORMAL /HPF

## 2024-03-19 PROCEDURE — 87086 URINE CULTURE/COLONY COUNT: CPT

## 2024-03-19 PROCEDURE — 81001 URINALYSIS AUTO W/SCOPE: CPT

## 2024-03-20 ENCOUNTER — TELEPHONE (OUTPATIENT)
Dept: FAMILY MEDICINE | Facility: CLINIC | Age: 69
End: 2024-03-20
Payer: COMMERCIAL

## 2024-03-20 NOTE — TELEPHONE ENCOUNTER
Medication is being filled for 1 time refill only due to:  Patient needs to be seen because due for PX.    Arabella Washington RN  [facility]   Bleeding that does not stop/Swelling that gets worse/Pain not relieved by Medications/Fever greater than (need to indicate Fahrenheit or Celsius)/Wound/Surgical Site with redness, or foul smelling discharge or pus/Numbness, tingling, color or temperature change to extremity

## 2024-03-20 NOTE — TELEPHONE ENCOUNTER
Pt calling about results for her UA     Reviewed that the results are not in yet     Pt asked that we call when results are in     Routing to provider to review     Madeline Lewis RN, BSN  [facility]

## 2024-03-21 LAB — BACTERIA UR CULT: NORMAL

## 2024-03-22 NOTE — RESULT ENCOUNTER NOTE
Inessa  I have reviewed your recent test results:    -Urine culture is normal.  There is no need for antibiotics at this point.  If new, worsening or persistent symptoms occur, then you should call or return for a recheck.    For additional lab test information, www.testing.com is an excellent reference.     If you have any questions please do not hesitate to contact our office via phone ([phone]) or [employer].    Healthy regards,     Doris Cruz MBA, MS, PA-C  [facility]

## 2024-07-10 ENCOUNTER — ANCILLARY PROCEDURE (OUTPATIENT)
Dept: BONE DENSITY | Facility: CLINIC | Age: 69
End: 2024-07-10
Attending: FAMILY MEDICINE
Payer: COMMERCIAL

## 2024-07-10 ENCOUNTER — HOSPITAL ENCOUNTER (OUTPATIENT)
Dept: MAMMOGRAPHY | Facility: CLINIC | Age: 69
Discharge: HOME | End: 2024-07-10
Attending: FAMILY MEDICINE | Admitting: FAMILY MEDICINE
Payer: COMMERCIAL

## 2024-07-10 DIAGNOSIS — Z12.31 VISIT FOR SCREENING MAMMOGRAM: ICD-10-CM

## 2024-07-10 DIAGNOSIS — Z78.0 ASYMPTOMATIC POSTMENOPAUSAL STATUS: ICD-10-CM

## 2024-07-10 PROCEDURE — 77063 BREAST TOMOSYNTHESIS BI: CPT

## 2024-07-10 PROCEDURE — 77080 DXA BONE DENSITY AXIAL: CPT | Mod: TC | Performed by: PHYSICIAN ASSISTANT

## 2024-08-05 ENCOUNTER — TELEPHONE (OUTPATIENT)
Dept: FAMILY MEDICINE | Facility: CLINIC | Age: 69
End: 2024-08-05
Payer: COMMERCIAL

## 2024-08-05 NOTE — TELEPHONE ENCOUNTER
Went onto [employer] and thought she got a message to make an appointment but I can't find that message anywhere. It could have been something not recent. I checked labs and imaging notes as well as [employer] messages.     She was not able to check while we were on phone. I asked her to try to log on again and see if she could find that message and the date sent so we could better try to find it.     We also discussed Dexascan results supplements recommended as she had a few general questions.     Patient has upcoming appointment in October for annual.

## 2024-08-12 ENCOUNTER — TRANSFERRED RECORDS (OUTPATIENT)
Dept: HEALTH INFORMATION MANAGEMENT | Facility: CLINIC | Age: 69
End: 2024-08-12
Payer: COMMERCIAL

## 2024-09-09 DIAGNOSIS — F33.41 DEPRESSION, MAJOR, RECURRENT, IN PARTIAL REMISSION (H): ICD-10-CM

## 2024-09-09 DIAGNOSIS — F33.8 SEASONAL AFFECTIVE DISORDER (H): ICD-10-CM

## 2024-09-09 DIAGNOSIS — F41.9 ANXIETY: ICD-10-CM

## 2024-09-09 RX ORDER — VENLAFAXINE HYDROCHLORIDE 37.5 MG/1
37.5 CAPSULE, EXTENDED RELEASE ORAL DAILY
Qty: 90 CAPSULE | Refills: 0 | Status: SHIPPED | OUTPATIENT
Start: 2024-09-09

## 2024-09-20 DIAGNOSIS — E78.5 HYPERLIPIDEMIA LDL GOAL <130: ICD-10-CM

## 2024-09-20 RX ORDER — SIMVASTATIN 40 MG
TABLET ORAL
Qty: 90 TABLET | Refills: 0 | Status: SHIPPED | OUTPATIENT
Start: 2024-09-20

## 2024-10-21 ENCOUNTER — PATIENT OUTREACH (OUTPATIENT)
Dept: ONCOLOGY | Facility: CLINIC | Age: 69
End: 2024-10-21

## 2024-10-21 ENCOUNTER — OFFICE VISIT (OUTPATIENT)
Dept: FAMILY MEDICINE | Facility: CLINIC | Age: 69
End: 2024-10-21
Payer: COMMERCIAL

## 2024-10-21 VITALS
TEMPERATURE: 97.2 F | OXYGEN SATURATION: 97 % | HEART RATE: 98 BPM | HEIGHT: 67 IN | DIASTOLIC BLOOD PRESSURE: 76 MMHG | WEIGHT: 180.4 LBS | RESPIRATION RATE: 12 BRPM | BODY MASS INDEX: 28.31 KG/M2 | SYSTOLIC BLOOD PRESSURE: 118 MMHG

## 2024-10-21 DIAGNOSIS — F33.8 SEASONAL AFFECTIVE DISORDER (H): ICD-10-CM

## 2024-10-21 DIAGNOSIS — Z80.3 FAMILY HISTORY OF BREAST CANCER: ICD-10-CM

## 2024-10-21 DIAGNOSIS — E72.12 METHYLENETETRAHYDROFOLATE REDUCTASE DEFICIENCY (H): ICD-10-CM

## 2024-10-21 DIAGNOSIS — E78.5 HYPERLIPIDEMIA LDL GOAL <130: ICD-10-CM

## 2024-10-21 DIAGNOSIS — D12.6 ADENOMATOUS POLYP OF COLON, UNSPECIFIED PART OF COLON: ICD-10-CM

## 2024-10-21 DIAGNOSIS — R29.890 LOSS OF HEIGHT: ICD-10-CM

## 2024-10-21 DIAGNOSIS — F42.9 OBSESSIVE-COMPULSIVE DISORDER, UNSPECIFIED TYPE: ICD-10-CM

## 2024-10-21 DIAGNOSIS — F33.41 DEPRESSION, MAJOR, RECURRENT, IN PARTIAL REMISSION (H): ICD-10-CM

## 2024-10-21 DIAGNOSIS — F41.9 ANXIETY: ICD-10-CM

## 2024-10-21 DIAGNOSIS — F42.4 EXCORIATION (SKIN-PICKING) DISORDER: ICD-10-CM

## 2024-10-21 DIAGNOSIS — Z80.41 FAMILY HISTORY OF MALIGNANT NEOPLASM OF OVARY: ICD-10-CM

## 2024-10-21 DIAGNOSIS — Z00.00 ENCOUNTER FOR MEDICARE ANNUAL WELLNESS EXAM: Primary | ICD-10-CM

## 2024-10-21 DIAGNOSIS — A60.00 GENITAL HERPES SIMPLEX, UNSPECIFIED SITE: ICD-10-CM

## 2024-10-21 DIAGNOSIS — E04.9 ENLARGED THYROID GLAND: ICD-10-CM

## 2024-10-21 LAB
ALBUMIN SERPL BCG-MCNC: 4.6 G/DL (ref 3.5–5.2)
ALP SERPL-CCNC: 59 U/L (ref 40–150)
ALT SERPL W P-5'-P-CCNC: 17 U/L (ref 0–50)
ANION GAP SERPL CALCULATED.3IONS-SCNC: 12 MMOL/L (ref 7–15)
AST SERPL W P-5'-P-CCNC: 27 U/L (ref 0–45)
BILIRUB SERPL-MCNC: 0.6 MG/DL
BUN SERPL-MCNC: 18.4 MG/DL (ref 8–23)
CALCIUM SERPL-MCNC: 9.8 MG/DL (ref 8.8–10.4)
CHLORIDE SERPL-SCNC: 101 MMOL/L (ref 98–107)
CHOLEST SERPL-MCNC: 206 MG/DL
CREAT SERPL-MCNC: 0.88 MG/DL (ref 0.51–0.95)
CREAT UR-MCNC: 59.3 MG/DL
EGFRCR SERPLBLD CKD-EPI 2021: 72 ML/MIN/1.73M2
ERYTHROCYTE [DISTWIDTH] IN BLOOD BY AUTOMATED COUNT: 12.8 % (ref 10–15)
FASTING STATUS PATIENT QL REPORTED: YES
FASTING STATUS PATIENT QL REPORTED: YES
GLUCOSE SERPL-MCNC: 91 MG/DL (ref 70–99)
HCO3 SERPL-SCNC: 27 MMOL/L (ref 22–29)
HCT VFR BLD AUTO: 41.3 % (ref 35–47)
HDLC SERPL-MCNC: 64 MG/DL
HGB BLD-MCNC: 14.1 G/DL (ref 11.7–15.7)
LDLC SERPL CALC-MCNC: 122 MG/DL
MCH RBC QN AUTO: 31.9 PG (ref 26.5–33)
MCHC RBC AUTO-ENTMCNC: 34.1 G/DL (ref 31.5–36.5)
MCV RBC AUTO: 93 FL (ref 78–100)
MICROALBUMIN UR-MCNC: <12 MG/L
MICROALBUMIN/CREAT UR: NORMAL MG/G{CREAT}
NONHDLC SERPL-MCNC: 142 MG/DL
PLATELET # BLD AUTO: 198 10E3/UL (ref 150–450)
POTASSIUM SERPL-SCNC: 4.6 MMOL/L (ref 3.4–5.3)
PROT SERPL-MCNC: 7.6 G/DL (ref 6.4–8.3)
RBC # BLD AUTO: 4.42 10E6/UL (ref 3.8–5.2)
SODIUM SERPL-SCNC: 140 MMOL/L (ref 135–145)
TRIGL SERPL-MCNC: 98 MG/DL
TSH SERPL DL<=0.005 MIU/L-ACNC: 2.17 UIU/ML (ref 0.3–4.2)
WBC # BLD AUTO: 4.4 10E3/UL (ref 4–11)

## 2024-10-21 PROCEDURE — G0008 ADMIN INFLUENZA VIRUS VAC: HCPCS | Performed by: FAMILY MEDICINE

## 2024-10-21 PROCEDURE — 99397 PER PM REEVAL EST PAT 65+ YR: CPT | Mod: 25 | Performed by: FAMILY MEDICINE

## 2024-10-21 PROCEDURE — 90662 IIV NO PRSV INCREASED AG IM: CPT | Performed by: FAMILY MEDICINE

## 2024-10-21 PROCEDURE — 82570 ASSAY OF URINE CREATININE: CPT | Performed by: FAMILY MEDICINE

## 2024-10-21 PROCEDURE — 80061 LIPID PANEL: CPT | Performed by: FAMILY MEDICINE

## 2024-10-21 PROCEDURE — 82043 UR ALBUMIN QUANTITATIVE: CPT | Performed by: FAMILY MEDICINE

## 2024-10-21 PROCEDURE — 90480 ADMN SARSCOV2 VAC 1/ONLY CMP: CPT | Performed by: FAMILY MEDICINE

## 2024-10-21 PROCEDURE — 36415 COLL VENOUS BLD VENIPUNCTURE: CPT | Performed by: FAMILY MEDICINE

## 2024-10-21 PROCEDURE — 80053 COMPREHEN METABOLIC PANEL: CPT | Performed by: FAMILY MEDICINE

## 2024-10-21 PROCEDURE — 84443 ASSAY THYROID STIM HORMONE: CPT | Performed by: FAMILY MEDICINE

## 2024-10-21 PROCEDURE — 91320 SARSCV2 VAC 30MCG TRS-SUC IM: CPT | Performed by: FAMILY MEDICINE

## 2024-10-21 PROCEDURE — 99214 OFFICE O/P EST MOD 30 MIN: CPT | Mod: 25 | Performed by: FAMILY MEDICINE

## 2024-10-21 PROCEDURE — 85027 COMPLETE CBC AUTOMATED: CPT | Performed by: FAMILY MEDICINE

## 2024-10-21 RX ORDER — VALACYCLOVIR HYDROCHLORIDE 500 MG/1
TABLET, FILM COATED ORAL
Qty: 18 TABLET | Refills: 3 | Status: SHIPPED | OUTPATIENT
Start: 2024-10-21

## 2024-10-21 RX ORDER — VENLAFAXINE HYDROCHLORIDE 37.5 MG/1
37.5 CAPSULE, EXTENDED RELEASE ORAL DAILY
Qty: 90 CAPSULE | Refills: 1 | Status: SHIPPED | OUTPATIENT
Start: 2024-10-21

## 2024-10-21 RX ORDER — SIMVASTATIN 40 MG
TABLET ORAL
Qty: 90 TABLET | Refills: 3 | Status: SHIPPED | OUTPATIENT
Start: 2024-10-21

## 2024-10-21 SDOH — HEALTH STABILITY: PHYSICAL HEALTH: ON AVERAGE, HOW MANY DAYS PER WEEK DO YOU ENGAGE IN MODERATE TO STRENUOUS EXERCISE (LIKE A BRISK WALK)?: 7 DAYS

## 2024-10-21 SDOH — HEALTH STABILITY: PHYSICAL HEALTH: ON AVERAGE, HOW MANY MINUTES DO YOU ENGAGE IN EXERCISE AT THIS LEVEL?: 40 MIN

## 2024-10-21 ASSESSMENT — ANXIETY QUESTIONNAIRES
7. FEELING AFRAID AS IF SOMETHING AWFUL MIGHT HAPPEN: NOT AT ALL
3. WORRYING TOO MUCH ABOUT DIFFERENT THINGS: SEVERAL DAYS
7. FEELING AFRAID AS IF SOMETHING AWFUL MIGHT HAPPEN: NOT AT ALL
IF YOU CHECKED OFF ANY PROBLEMS ON THIS QUESTIONNAIRE, HOW DIFFICULT HAVE THESE PROBLEMS MADE IT FOR YOU TO DO YOUR WORK, TAKE CARE OF THINGS AT HOME, OR GET ALONG WITH OTHER PEOPLE: SOMEWHAT DIFFICULT
1. FEELING NERVOUS, ANXIOUS, OR ON EDGE: SEVERAL DAYS
2. NOT BEING ABLE TO STOP OR CONTROL WORRYING: SEVERAL DAYS
GAD7 TOTAL SCORE: 4
6. BECOMING EASILY ANNOYED OR IRRITABLE: SEVERAL DAYS
4. TROUBLE RELAXING: NOT AT ALL
5. BEING SO RESTLESS THAT IT IS HARD TO SIT STILL: NOT AT ALL
GAD7 TOTAL SCORE: 4
GAD7 TOTAL SCORE: 4
8. IF YOU CHECKED OFF ANY PROBLEMS, HOW DIFFICULT HAVE THESE MADE IT FOR YOU TO DO YOUR WORK, TAKE CARE OF THINGS AT HOME, OR GET ALONG WITH OTHER PEOPLE?: SOMEWHAT DIFFICULT

## 2024-10-21 ASSESSMENT — PATIENT HEALTH QUESTIONNAIRE - PHQ9
SUM OF ALL RESPONSES TO PHQ QUESTIONS 1-9: 4
10. IF YOU CHECKED OFF ANY PROBLEMS, HOW DIFFICULT HAVE THESE PROBLEMS MADE IT FOR YOU TO DO YOUR WORK, TAKE CARE OF THINGS AT HOME, OR GET ALONG WITH OTHER PEOPLE: SOMEWHAT DIFFICULT
SUM OF ALL RESPONSES TO PHQ QUESTIONS 1-9: 4

## 2024-10-21 ASSESSMENT — SOCIAL DETERMINANTS OF HEALTH (SDOH): HOW OFTEN DO YOU GET TOGETHER WITH FRIENDS OR RELATIVES?: TWICE A WEEK

## 2024-10-21 NOTE — PATIENT INSTRUCTIONS
Patient Education     For Pilates - Mariya Vilchis and Kayla Ji     For mobility and mild strength training - Nickie Calhoun.     Try Chair Yoga or Yoga for Seniors and/or José Chi to help with flexibility and neck and back health. Yoga and José Chi are highly recommended for balance, stretching, strengthening , posture, and decreasing heart attack and stroke risk.  Look at your health club schedule as well as free YouTube videos on the above.        Preventive Care Advice :   This is general advice given by our system to help you stay healthy. However, your care team may have specific advice just for you. Please talk to your care team about your preventive care needs.  Nutrition  Eat 5 or more servings of fruits and vegetables each day.  Try wheat bread, brown rice and whole grain pasta (instead of white bread, rice, and pasta).  Get enough calcium and vitamin D. Check the label on foods and aim for 100% of the RDA (recommended daily allowance).  Lifestyle  Exercise at least 150 minutes each week  (30 minutes a day, 5 days a week).  Do muscle strengthening activities 2 days a week. These help control your weight and prevent disease.  No smoking.  Wear sunscreen to prevent skin cancer.  Have a dental exam and cleaning every 6 months.  Yearly exams  See your health care team every year to talk about:  Any changes in your health.  Any medicines your care team has prescribed.  Preventive care, family planning, and ways to prevent chronic diseases.  Shots (vaccines)   HPV shots (up to age 26), if you've never had them before.  Hepatitis B shots (up to age 59), if you've never had them before.  COVID-19 shot: Get this shot when it's due.  Flu shot: Get a flu shot every year.  Tetanus shot: Get a tetanus shot every 10 years.  Pneumococcal, hepatitis A, and RSV shots: Ask your care team if you need these based on your risk.  Shingles shot (for age 50 and up)  General health tests  Diabetes screening:  Starting at age 35,  Get screened for diabetes at least every 3 years.  If you are younger than age 35, ask your care team if you should be screened for diabetes.  Cholesterol test: At age 39, start having a cholesterol test every 5 years, or more often if advised.  Bone density scan (DEXA): At age 50, ask your care team if you should have this scan for osteoporosis (brittle bones).  Hepatitis C: Get tested at least once in your life.  STIs (sexually transmitted infections)  Before age 24: Ask your care team if you should be screened for STIs.  After age 24: Get screened for STIs if you're at risk. You are at risk for STIs (including HIV) if:  You are sexually active with more than one person.  You don't use condoms every time.  You or a partner was diagnosed with a sexually transmitted infection.  If you are at risk for HIV, ask about PrEP medicine to prevent HIV.  Get tested for HIV at least once in your life, whether you are at risk for HIV or not.  Cancer screening tests  Cervical cancer screening: If you have a cervix, begin getting regular cervical cancer screening tests starting at age 21.  Breast cancer scan (mammogram): If you've ever had breasts, begin having regular mammograms starting at age 40. This is a scan to check for breast cancer.  Colon cancer screening: It is important to start screening for colon cancer at age 45.  Have a colonoscopy test every 10 years (or more often if you're at risk) Or, ask your provider about stool tests like a FIT test every year or Cologuard test every 3 years.  To learn more about your testing options, visit:   .  For help making a decision, visit:   https://bit.ly/ij17805.  Prostate cancer screening test: If you have a prostate, ask your care team if a prostate cancer screening test (PSA) at age 55 is right for you.  Lung cancer screening: If you are a current or former smoker ages 50 to 80, ask your care team if ongoing lung cancer screenings are right for you.  For informational purposes  only. Not to replace the advice of your health care provider. Copyright   2023 [facility]. All rights reserved. Clinically reviewed by the [facility]. [employer] 841453 - REV 01/24.  Bladder Training: Care Instructions  Your Care Instructions     Bladder training is used to treat urge incontinence and stress incontinence. Urge incontinence means that the need to urinate comes on so fast that you can't get to a toilet in time. Stress incontinence means that you leak urine because of pressure on your bladder. For example, it may happen when you laugh, cough, or lift something heavy.  Bladder training can increase how long you can wait before you have to urinate. It can also help your bladder hold more urine. And it can give you better control over the urge to urinate.  It is important to remember that bladder training takes a few weeks to a few months to make a difference. You may not see results right away, but don't give up.  Follow-up care is a key part of your treatment and safety. Be sure to make and go to all appointments, and call your doctor if you are having problems. It's also a good idea to know your test results and keep a list of the medicines you take.  How can you care for yourself at home?  Work with your doctor to come up with a bladder training program that is right for you. You may use one or more of the following methods.  Delayed urination  In the beginning, try to keep from urinating for 5 minutes after you first feel the need to go.  While you wait, take deep, slow breaths to relax. Kegel exercises can also help you delay the need to go to the bathroom.  After some practice, when you can easily wait 5 minutes to urinate, try to wait 10 minutes before you urinate.  Slowly increase the waiting period until you are able to control when you have to urinate.  Scheduled urination  Empty your bladder when you first wake up in the morning.  Schedule times  "throughout the day when you will urinate.  Start by going to the bathroom every hour, even if you don't need to go.  Slowly increase the time between trips to the bathroom.  When you have found a schedule that works well for you, keep doing it.  If you wake up during the night and have to urinate, do it. Apply your schedule to waking hours only.  Kegel exercises  These tighten and strengthen pelvic muscles, which can help you control the flow of urine. (If doing these exercises causes pain, stop doing them and talk with your doctor.) To do Kegel exercises:  Squeeze your muscles as if you were trying not to pass gas. Or squeeze your muscles as if you were stopping the flow of urine. Your belly, legs, and buttocks shouldn't move.  Hold the squeeze for 3 seconds, then relax for 5 to 10 seconds.  Start with 3 seconds, then add 1 second each week until you are able to squeeze for 10 seconds.  Repeat the exercise 10 times a session. Do 3 to 8 sessions a day.  When should you call for help?  Watch closely for changes in your health, and be sure to contact your doctor if:    Your incontinence is getting worse.     You do not get better as expected.   Where can you learn more?  Go to https://www.[employer].net/patiented  Enter V684 in the search box to learn more about \"Bladder Training: Care Instructions.\"  Current as of: November 15, 2023  Content Version: 14.2 2024 [employer].   Care instructions adapted under license by your healthcare professional. If you have questions about a medical condition or this instruction, always ask your healthcare professional. [employer] disclaims any warranty or liability for your use of this information.       "

## 2024-10-21 NOTE — PROGRESS NOTES
New Patient Oncology Nurse Navigator Note     Referring provider: Gabriela Moore MD      Referring Clinic/Organization:     [facility] [place]        Referred to (specialty:) Genetic Counseling     Requested provider (if applicable): NA     Date Referral Received: October 21, 2024     Evaluation for:    Z80.41 (ICD-10-CM) - Family history of malignant neoplasm of ovary   Z80.3 (ICD-10-CM) - Family history of breast cancer       Payor: MEDICA / Plan: [employer] / Product Type: HMO /     October 21, 2024  Referral received and reviewed.   Sent to NPS to process.     Erika SWENSONN, RN, OCN  Oncology Nurse Navigator   [facility]  Cancer Care Service Line   New Patient Hem/Onc Scheduling / Referrals: [phone] (fax: [phone] )

## 2024-10-21 NOTE — PROGRESS NOTES
Preventive Care Visit  [facility] [place]  Gabriela Moore MD, Family Medicine  Oct 21, 2024      Assessment & Plan       ICD-10-CM    1. Encounter for Medicare annual wellness exam  Z00.00 REVIEW OF HEALTH MAINTENANCE PROTOCOL ORDERS      2. Hyperlipidemia LDL goal <130  E78.5 COMPREHENSIVE METABOLIC PANEL     Lipid panel reflex to direct LDL Fasting     Comprehensive metabolic panel     Albumin Random Urine Quantitative with Creat Ratio     simvastatin (ZOCOR) 40 MG tablet     COMPREHENSIVE METABOLIC PANEL     Lipid panel reflex to direct LDL Fasting     Albumin Random Urine Quantitative with Creat Ratio     CANCELED: Comprehensive metabolic panel      3. Enlarged thyroid gland- hx of - doesn't feel enlarged or nodular today 10/21/2024  E04.9 TSH WITH FREE T4 REFLEX     TSH WITH FREE T4 REFLEX      4. Adenomatous colon polyp-at age 52, then normal colonoscopy at age 55 and then at age 60-  1/2021 normal - repeat 1/2028  D12.6       5. Excoriation (skin-picking) disorder- picking at cuticles till they bleed  F42.4       6. Obsessive-compulsive disorder, unspecified type - ruminant thoughts at night mostly with picking at cuticles as well  F42.9 Adult Mental Health  Referral      7. Loss of height - osteopenia seen on bone density - doing resistance training  R29.890       8. Genital herpes simplex, unspecified site  A60.00 valACYclovir (VALTREX) 500 MG tablet      9. Depression, major, recurrent, in partial remission (H)  F33.41 Adult Mental Health  Referral     venlafaxine (EFFEXOR XR) 37.5 MG 24 hr capsule      10. Anxiety  F41.9 Adult Mental Health  Referral     venlafaxine (EFFEXOR XR) 37.5 MG 24 hr capsule      11. Seasonal affective disorder (H)  F33.8 venlafaxine (EFFEXOR XR) 37.5 MG 24 hr capsule      12. Methylenetetrahydrofolate reductase deficiency (H)  E72.12 CBC with Platelets     CBC with Platelets      13. Family history of malignant neoplasm of  "ovary - sister Devora  Z80.41 Adult Oncology/Hematology  Referral      14. Family history of breast cancer- MGM ? in her 70's  Z80.3 Adult Oncology/Hematology  Referral        Please, call our clinic or go to the ER immediately if signs or symptoms worsen or fail to improve as anticipated.     Return in about 6 months (around 2025) for depression, anxiety, w/ Dr. GIBBS for 40 min appt, then in 1 year for Annual Wellness visit .       Patient has been advised of split billing requirements and indicates understanding: Yes        BMI  Estimated body mass index is 28.31 kg/m  as calculated from the following:    Height as of this encounter: 1.7 m (5' 6.93\").    Weight as of this encounter: 81.8 kg (180 lb 6.4 oz).       Counseling  Appropriate preventive services were addressed with this patient via screening, questionnaire, or discussion as appropriate for fall prevention, nutrition, physical activity, Tobacco-use cessation, social engagement, weight loss and cognition.  Checklist reviewing preventive services available has been given to the patient.  Reviewed patient's diet, addressing concerns and/or questions.   She is at risk for psychosocial distress and has been provided with information to reduce risk.   Information on urinary incontinence and treatment options given to patient.       MEDICATIONS:   Orders Placed This Encounter   Medications    simvastatin (ZOCOR) 40 MG tablet     Sig: TAKE 1 TABLET IN THE EVENING     Dispense:  90 tablet     Refill:  3     ### Profile Rx: patient will contact pharmacy when needed ###    valACYclovir (VALTREX) 500 MG tablet     Si tab twice daily for 3 days per occurrence - use at first sign of return lesions     Dispense:  18 tablet     Refill:  3     ### Profile Rx: patient will contact pharmacy when needed ###    venlafaxine (EFFEXOR XR) 37.5 MG 24 hr capsule     Sig: Take 1 capsule (37.5 mg) by mouth daily.     Dispense:  90 capsule     Refill:  1     " ### Profile Rx: patient will contact pharmacy when needed ###          - Continue other medications without change  Regular exercise  See Patient Instructions           Madisyn Wylie is a 69 year old, presenting for the following:  Physical  and the following other medical problems:      1. Encounter for Medicare annual wellness exam    2. Hyperlipidemia LDL goal <130    3. Enlarged thyroid gland- hx of - doesn't feel enlarged or nodular today 10/21/2024    4. Adenomatous colon polyp-at age 52, then normal colonoscopy at age 55 and then at age 60-  1/2021 normal - repeat 1/2028    5. Excoriation (skin-picking) disorder- picking at cuticles till they bleed    6. Obsessive-compulsive disorder, unspecified type - ruminant thoughts at night mostly with picking at cuticles as well    7. Loss of height - osteopenia seen on bone density - doing resistance training    8. Genital herpes simplex, unspecified site    9. Depression, major, recurrent, in partial remission (H)    10. Anxiety    11. Seasonal affective disorder (H)    12. Methylenetetrahydrofolate reductase deficiency (H)    13. Family history of malignant neoplasm of ovary - sister Devora    14. Family history of breast cancer- MGM ? in her 70's            10/21/2024     9:20 AM   Additional Questions   Roomed by [place] CMA   Accompanied by Self       Health Care Directive  Patient does not have a Health Care Directive or Living Will: Discussed advance care planning with patient; however, patient declined at this time.    HPI    [facility] came out to her home to do screening as offered by her insurance - did MARCELINO's = 1.26 on the right nad 1.21 on the left = really good.   A1c = 5.0.   GFR >60.       Depression and Anxiety   How are you doing with your depression since your last visit? Improved   How are you doing with your anxiety since your last visit?  Improved   Are you having other symptoms that might be associated with depression or anxiety? Yes:   Eating to much  Have you had a significant life event? OTHER: Sister's Health    Do you have any concerns with your use of alcohol or other drugs? No    Social History     Tobacco Use    Smoking status: Never    Smokeless tobacco: Never    Tobacco comments:     None   Vaping Use    Vaping status: Never Used   Substance Use Topics    Alcohol use: Yes     Comment: Very limited    Drug use: No     Comment: no herbal meds either          9/29/2023     2:41 PM 11/13/2023    10:59 AM 10/21/2024     9:14 AM   PHQ   PHQ-9 Total Score 2 7 4   Q9: Thoughts of better off dead/self-harm past 2 weeks Not at all Not at all Not at all         3/2/2023     9:43 AM 9/26/2023     7:02 PM 10/21/2024     9:14 AM   MIGEL-7 SCORE   Total Score 4 (minimal anxiety) 4 (minimal anxiety) 4 (minimal anxiety)   Total Score 4 4 4         10/21/2024     9:14 AM   Last PHQ-9   1.  Little interest or pleasure in doing things 0   2.  Feeling down, depressed, or hopeless 0   3.  Trouble falling or staying asleep, or sleeping too much 1   4.  Feeling tired or having little energy 0   5.  Poor appetite or overeating 3   6.  Feeling bad about yourself 0   7.  Trouble concentrating 0   8.  Moving slowly or restless 0   Q9: Thoughts of better off dead/self-harm past 2 weeks 0   PHQ-9 Total Score 4         10/21/2024     9:14 AM   MIGEL-7    1. Feeling nervous, anxious, or on edge 1   2. Not being able to stop or control worrying 1   3. Worrying too much about different things 1   4. Trouble relaxing 0   5. Being so restless that it is hard to sit still 0   6. Becoming easily annoyed or irritable 1   7. Feeling afraid, as if something awful might happen 0   MIGEL-7 Total Score 4   If you checked any problems, how difficult have they made it for you to do your work, take care of things at home, or get along with other people? Somewhat difficult       Suicide Assessment Five-step Evaluation and Treatment (SAFE-T)          10/21/2024   General Health   How would  you rate your overall physical health? Good   Feel stress (tense, anxious, or unable to sleep) Only a little      (!) STRESS CONCERN      10/21/2024   Nutrition   Diet: Regular (no restrictions)    Low salt    Low fat/cholesterol    Vegetarian/vegan       Multiple values from one day are sorted in reverse-chronological order         10/21/2024   Exercise   Days per week of moderate/strenous exercise 7 days   Average minutes spent exercising at this level 40 min            10/21/2024   Social Factors   Frequency of gathering with friends or relatives Twice a week   Worry food won't last until get money to buy more No   Food not last or not have enough money for food? No   Do you have housing? (Housing is defined as stable permanent housing and does not include staying ouside in a car, in a tent, in an abandoned building, in an overnight shelter, or couch-surfing.) Yes   Are you worried about losing your housing? No   Lack of transportation? No   Unable to get utilities (heat,electricity)? No            10/21/2024   Fall Risk   Fallen 2 or more times in the past year? No   Trouble with walking or balance? No             10/21/2024   Activities of Daily Living- Home Safety   Needs help with the following daily activites None of the above   Safety concerns in the home None of the above            10/21/2024   Dental   Dentist two times every year? Yes            10/21/2024   Hearing Screening   Hearing concerns? None of the above            10/21/2024   Driving Risk Screening   Patient/family members have concerns about driving No            10/21/2024   General Alertness/Fatigue Screening   Have you been more tired than usual lately? No            10/21/2024   Urinary Incontinence Screening   Bothered by leaking urine in past 6 months Yes            10/21/2024   TB Screening   Were you born outside of the [place]? No          Today's PHQ-9 Score:       10/21/2024     9:14 AM   PHQ-9 SCORE   PHQ-9 Total Score MyChart 4  (Minimal depression)   PHQ-9 Total Score 4         10/21/2024   Substance Use   Alcohol more than 3/day or more than 7/wk Not Applicable   Do you have a current opioid prescription? No   How severe/bad is pain from 1 to 10? 0/10 (No Pain)   Do you use any other substances recreationally? No        Social History     Tobacco Use    Smoking status: Never    Smokeless tobacco: Never    Tobacco comments:     None   Vaping Use    Vaping status: Never Used   Substance Use Topics    Alcohol use: Yes     Comment: Very limited    Drug use: No     Comment: no herbal meds either            7/10/2024   LAST FHS-7 RESULTS   1st degree relative breast or ovarian cancer Yes    No   Any relative bilateral breast cancer No   Any male have breast cancer No   Any ONE woman have BOTH breast AND ovarian cancer No   Any woman with breast cancer before 50yrs No   2 or more relatives with breast AND/OR ovarian cancer Yes    No   2 or more relatives with breast AND/OR bowel cancer No       Multiple values from one day are sorted in reverse-chronological order   Mammogram Screening - Mammogram every 1-2 years updated in Health Maintenance based on mutual decision making      History of abnormal Pap smear: No - age 65 or older with adequate negative prior screening test results (3 consecutive negative cytology results, 2 consecutive negative cotesting results, or 2 consecutive negative HrHPV test results within 10 years, with the most recent test occurring within the recommended screening interval for the test used)        Latest Ref Rng & Units 6/2/2021     5:38 PM 3/1/2018    11:52 AM 3/1/2018    11:26 AM   PAP / HPV   PAP (Historical)  NIL   NIL    HPV 16 DNA NEG^Negative Negative  Negative     HPV 18 DNA NEG^Negative Negative  Negative     Other HR HPV NEG^Negative Negative  Negative       ASCVD Risk   The 10-year ASCVD risk score (Aaron GUTIERREZ, et al., 2019) is: 5.3%    Values used to calculate the score:      Age: 69 years       Sex: Female      Is Non- : No      Diabetic: No      Tobacco smoker: No      Systolic Blood Pressure: 118 mmHg      Is BP treated: No      HDL Cholesterol: 75 mg/dL      Total Cholesterol: 199 mg/dL            Reviewed and updated as needed this visit by Provider                    Past Medical History:   Diagnosis Date    Adenomatous colon polyp at age 52     normal colonoscopy at age 55 and then at age 60    Cystocele     midline     Depression, major, recurrent, in partial remission (H) dx'd in her late 30's     varied with hormones, too. has been on 4 different medications     Depressive disorder ?    Currently on medication    DVT (deep venous thrombosis) (H)     s/p bladder sling surgery /repair , no further work up done    History of thrombophlebitis     Hyperlipidemia LDL goal <130     Spider veins     STD (sexually transmitted disease)     Herpes    Urinary urgency      Past Surgical History:   Procedure Laterality Date    BREAST BIOPSY, RT/LT Right ?     benign    BUNIONECTOMY Left 2008    Dr. Alberto Laws - left foot medial and lateral with left 2nd toe surgery     COLONOSCOPY  ?    SLING BLADDER SUSPENSION WITH FASCIA SULAIMAN  2008    - Dr. Emanuel Paz - had DVT afterward     XR ANKLE SURGERY NIXON LEFT  2008    Dr. Alberto Laws -arthroscopy ankle s/p fall - not fused      OB History    Para Term  AB Living   3 3 3 0 0 3   SAB IAB Ectopic Multiple Live Births   0 0 0 0 0      # Outcome Date GA Lbr [place]/2nd Weight Sex Type Anes PTL Lv   3 Term            2 Term            1 Term               Obstetric Comments    x 3 = no complications except with varicose veins in the 3rd pregnancy and some low blood pressure after delivery - had to wear compression hose - wasn't on blood thinners      Lab work is in process  Labs reviewed in EPIC  BP Readings from Last 3 Encounters:   10/21/24 118/76   23 118/78   23 (!) 118/90    Wt  Readings from Last 3 Encounters:   10/21/24 81.8 kg (180 lb 6.4 oz)   11/13/23 81.6 kg (180 lb)   09/29/23 79.8 kg (176 lb)                  Patient Active Problem List   Diagnosis    Depression, major, recurrent, in partial remission (H)    Hyperlipidemia LDL goal <130    Genital herpes simplex, unspecified site    Cystocele    Family history of thyroid disorder- mother and sister    Non morbid obesity due to excess calories    Asymptomatic menopausal state    Urge incontinence of urine    Family history of osteoporosis- mother with signif. 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 depression/anxiety     Numbness in both legs- went away after coming off prenatal vitamin for MTHFR mutation     Hammer toe of second toe of left foot    Enlarged thyroid gland- very mildly enlarged with mildl nodularity  and  firmness     Multiple pigmented nevi- diffuse - with sun-damaged skin     Adenomatous colon polyp-at age 52, then normal colonoscopy at age 55 and then at age 60-  1/2021 normal - repeat 1/2028    Elevated antinuclear antibody (LIZZETTE) level    Methylenetetrahydrofolate reductase deficiency (H)    Anxiety    Seasonal affective disorder (H)     Past Surgical History:   Procedure Laterality Date    BREAST BIOPSY, RT/LT Right 2000?     benign    BUNIONECTOMY Left 01/21/2008    Dr. Alberto Laws - left foot medial and lateral with left 2nd toe surgery     COLONOSCOPY  ?    SLING BLADDER SUSPENSION WITH FASCIA SULAIMAN  01/21/2008    - Dr. Emanuel Paz - had DVT afterward     XR ANKLE SURGERY NIXON LEFT  01/21/2008    Dr. Alberto Laws -arthroscopy ankle s/p fall - not fused        Social History     Tobacco Use    Smoking status: Never    Smokeless tobacco: Never    Tobacco comments:     None   Substance Use Topics    Alcohol use: Yes     Comment: Very  limited     Family History   Problem Relation Age of Onset    Cerebrovascular Disease Mother 88        tiny strokes - TIA -  at age 90    Hypertension Mother     Osteoporosis Mother     Alzheimer Disease Father 81         age 89    Heart Disease Father 63        s/p MI - first at age 63 , then s/p 4 vessel CABG    Depression Father     Bipolar Disorder Sister         homeless  - hasbipolar disorder -with heroin addiction - on methadone - no contact for years    Substance Abuse Sister         heroin    Bipolar Disorder Sister         bipolar - 2 with hoarding tendencies, psych haluciantions,      Psychosis Sister     Mental Illness Sister     Dementia Sister     Mental Illness Sister         hoarding tendencies    Ovarian Cancer Sister 73    Psychosis Sister     Substance Abuse Sister         Opiates     Mental Illness Sister     Thyroid Disease Sister     Dementia Sister         now in Memory care    Mental Illness Brother          after fall while drunk - alcoholic     Aortic dissection Brother 65        dissecting thoracic aortic aneurysm     Hepatitis Brother         Hep C - treated    Breast Cancer Maternal Grandmother     Attention Deficit Disorder Daughter     Substance Abuse Daughter         adderall that was not prescribed    Colon Cancer No family hx of          Current Outpatient Medications   Medication Sig Dispense Refill    calcium carbonate-vitamin D 600-200 MG-UNIT TABS Take 1 tablet by mouth      folic acid (FOLVITE) 1 MG tablet Take 1 tablet (1 mg) by mouth daily      Multiple Vitamins-Minerals (MULTIVITAMIN WOMEN PO)       omega 3 1000 MG CAPS Take 2 g by mouth daily 180 capsule 1    simvastatin (ZOCOR) 40 MG tablet TAKE 1 TABLET IN THE EVENING 90 tablet 3    valACYclovir (VALTREX) 500 MG tablet 1 tab twice daily for 3 days per occurrence - use at first sign of return lesions 18 tablet 3    venlafaxine (EFFEXOR XR) 37.5 MG 24 hr capsule Take 1 capsule (37.5 mg) by mouth daily. 90  capsule 1     Allergies   Allergen Reactions    Sulfa Antibiotics      rash     Recent Labs   Lab Test 09/29/23  1627 05/16/22  0838 06/02/21  1651 09/09/19  1504 08/09/18  1425 03/01/18  1042 01/09/17  1127   A1C  --   --   --   --   --  5.3 5.3   * 93 131* 95   < > 107* 98   HDL 75 67 82 67   < > 60 60   TRIG 103 176* 91 82   < > 106 227*   ALT 12 17 25 19   < > 17 20   CR 0.90 0.91 0.90 0.83   < > 0.78 0.73   GFRESTIMATED 70 70 67 76   < > 75 81   GFRESTBLACK  --   --  78 88   < > >90 >90  African American GFR Calc     POTASSIUM 4.6 4.2 4.2 3.9   < > 4.4 4.1   TSH 0.93 2.37 1.50 1.42   < > 2.18 1.89    < > = values in this interval not displayed.      Current providers sharing in care for this patient include:  Patient Care Team:  Gabriela Moore MD as PCP - General (Family Practice)  Gabriela Moore MD as Assigned PCP    The following health maintenance items are reviewed in Epic and correct as of today:  Health Maintenance   Topic Date Due    INFLUENZA VACCINE (1) 09/01/2024    COVID-19 Vaccine (5 - 2024-25 season) 09/01/2024    CMP  09/29/2024    LIPID  09/29/2024    ANNUAL REVIEW OF HM ORDERS  09/29/2024    CBC  09/29/2024    MEDICARE ANNUAL WELLNESS VISIT  09/29/2024    TSH W/FREE T4 REFLEX  09/29/2024    PHQ-9  04/21/2025    MIGEL ASSESSMENT  10/21/2025    FALL RISK ASSESSMENT  10/21/2025    MAMMO SCREENING  07/10/2026    GLUCOSE  09/29/2026    COLORECTAL CANCER SCREENING  08/23/2028    ADVANCE CARE PLANNING  10/29/2028    DEXA  07/10/2029    RSV VACCINE (1 - 1-dose 75+ series) 02/17/2032    DTAP/TDAP/TD IMMUNIZATION (4 - Td or Tdap) 06/07/2032    HEPATITIS C SCREENING  Completed    DEPRESSION ACTION PLAN  Completed    Pneumococcal Vaccine: 65+ Years  Completed    ZOSTER IMMUNIZATION  Completed    HPV IMMUNIZATION  Aged Out    MENINGITIS IMMUNIZATION  Aged Out    RSV MONOCLONAL ANTIBODY  Aged Out    PAP  Discontinued         Review of Systems  Constitutional, HEENT, cardiovascular,  "pulmonary, GI, , musculoskeletal, neuro, skin, endocrine and psych systems are negative, except as otherwise noted. Asymptomatic for any menopausal symptoms.      Objective    Exam  /76   Pulse 98   Temp 97.2  F (36.2  C) (Tympanic)   Resp 12   Ht 1.7 m (5' 6.93\")   Wt 81.8 kg (180 lb 6.4 oz)   SpO2 97%   BMI 28.31 kg/m     Estimated body mass index is 28.31 kg/m  as calculated from the following:    Height as of this encounter: 1.7 m (5' 6.93\").    Weight as of this encounter: 81.8 kg (180 lb 6.4 oz).    Physical Exam  GENERAL: alert and no distress  EYES: Eyes grossly normal to inspection, PERRL and conjunctivae and sclerae normal  HENT: ear canals and TM's normal, nose and mouth without ulcers or lesions  NECK: no adenopathy, no asymmetry, masses, or scars  RESP: lungs clear to auscultation - no rales, rhonchi or wheezes  BREAST: normal without masses, tenderness or nipple discharge and no palpable axillary masses or adenopathy  CV: regular rate and rhythm, normal S1 S2, no S3 or S4, no murmur, click or rub, no peripheral edema  ABDOMEN: soft, nontender, no hepatosplenomegaly, no masses and bowel sounds normal  MS: no gross musculoskeletal defects noted, no edema  SKIN: no suspicious lesions or rashes  NEURO: Normal strength and tone, mentation intact and speech normal  PSYCH: mentation appears normal, affect normal/bright         10/21/2024   Mini Cog   Clock Draw Score 2 Normal   3 Item Recall 2 objects recalled   Mini Cog Total Score 4               Signed Electronically by: Gabriela Moore MD  "

## 2024-11-11 NOTE — RESULT ENCOUNTER NOTE
Dear Inessa,     Thank you for your patience in getting back to you re: your results.      All your recent labs that I ordered are normal, improved, or pretty stable from previous.     Please, continue your current medications and/or supplements and follow up as we discussed at your last visit.     For additional lab test information, [email or web address] is an excellent reference.    Thank you so much for choosing [facility].  Please contact us with any questions that you may have.   We appreciate the opportunity to serve you now and look forward to supporting your healthcare needs for a long time to come!    Most Sincerely,     Gabriela Moore MD

## 2025-02-05 ENCOUNTER — TELEPHONE (OUTPATIENT)
Dept: FAMILY MEDICINE | Facility: CLINIC | Age: 70
End: 2025-02-05
Payer: COMMERCIAL

## 2025-02-05 NOTE — TELEPHONE ENCOUNTER
"Pt states she takes Magnesium supplement daily, per Dr Moore.  She doesn't recall why she started this or when. It has been \"a long time ago\"     She has been taking the brand, [employer] Magnesium 200 mg, 1 tablet daily.     She just noticed on the bottle that recommended adult daily intake, is to take 2 tablets. (400 mg).    She is asking if should start taking 2 tablets daily?           "

## 2025-05-05 ENCOUNTER — TELEPHONE (OUTPATIENT)
Dept: FAMILY MEDICINE | Facility: CLINIC | Age: 70
End: 2025-05-05
Payer: COMMERCIAL

## 2025-05-05 NOTE — TELEPHONE ENCOUNTER
S-(situation): supplement question    B-(background): n/a    A-(assessment): son takes creatine. Patient wondering if she should take it for cognitive benefits, antidepressant benefit. She listened to podcast on it. Wants provider recommendation    R-(recommendations): Routing to provider to review and advise. Patient ok to wait for PCP response later this week when back in clinic.     CARLY ESTEVEZ RN on 5/5/2025 at 10:39 AM   [facility]

## 2025-05-25 DIAGNOSIS — F33.8 SEASONAL AFFECTIVE DISORDER: ICD-10-CM

## 2025-05-25 DIAGNOSIS — F33.41 DEPRESSION, MAJOR, RECURRENT, IN PARTIAL REMISSION: ICD-10-CM

## 2025-05-25 DIAGNOSIS — F41.9 ANXIETY: ICD-10-CM

## 2025-05-28 RX ORDER — VENLAFAXINE HYDROCHLORIDE 37.5 MG/1
37.5 CAPSULE, EXTENDED RELEASE ORAL DAILY
Qty: 90 CAPSULE | Refills: 0 | Status: SHIPPED | OUTPATIENT
Start: 2025-05-28

## 2025-05-28 NOTE — TELEPHONE ENCOUNTER
"From 10/21/2024 physical follow up: \"Return in about 6 months (around 4/21/2025) for depression, anxiety, w/ Dr. GIBBS for 40 min appt, then in 1 year for Annual Wellness visit . \"     Pt did not do 6 month follow up appt.  Please schedule for video visit in the next month for this and after 10/21/2025 for Annual Wellness visit.   Please assist pt in making appt(s) for the above.     "

## 2025-07-28 ENCOUNTER — OFFICE VISIT (OUTPATIENT)
Dept: URGENT CARE | Facility: URGENT CARE | Age: 70
End: 2025-07-28
Payer: COMMERCIAL

## 2025-07-28 ENCOUNTER — NURSE TRIAGE (OUTPATIENT)
Dept: FAMILY MEDICINE | Facility: CLINIC | Age: 70
End: 2025-07-28
Payer: COMMERCIAL

## 2025-07-28 VITALS
OXYGEN SATURATION: 97 % | TEMPERATURE: 98.6 F | RESPIRATION RATE: 20 BRPM | HEART RATE: 78 BPM | DIASTOLIC BLOOD PRESSURE: 85 MMHG | BODY MASS INDEX: 29.51 KG/M2 | SYSTOLIC BLOOD PRESSURE: 131 MMHG | WEIGHT: 188 LBS

## 2025-07-28 DIAGNOSIS — T78.3XXA ANGIOEDEMA, INITIAL ENCOUNTER: ICD-10-CM

## 2025-07-28 DIAGNOSIS — L50.9 HIVES: Primary | ICD-10-CM

## 2025-07-28 PROCEDURE — 3075F SYST BP GE 130 - 139MM HG: CPT | Performed by: NURSE PRACTITIONER

## 2025-07-28 PROCEDURE — 99214 OFFICE O/P EST MOD 30 MIN: CPT | Performed by: NURSE PRACTITIONER

## 2025-07-28 PROCEDURE — 3079F DIAST BP 80-89 MM HG: CPT | Performed by: NURSE PRACTITIONER

## 2025-07-28 RX ORDER — PREDNISONE 20 MG/1
TABLET ORAL
Qty: 7 TABLET | Refills: 0 | Status: SHIPPED | OUTPATIENT
Start: 2025-07-28

## 2025-07-28 RX ORDER — PREDNISONE 20 MG/1
40 TABLET ORAL ONCE
Status: COMPLETED | OUTPATIENT
Start: 2025-07-28 | End: 2025-07-28

## 2025-07-28 RX ORDER — CETIRIZINE HYDROCHLORIDE 10 MG/1
10 TABLET ORAL ONCE
Status: COMPLETED | OUTPATIENT
Start: 2025-07-28 | End: 2025-07-28

## 2025-07-28 RX ADMIN — CETIRIZINE HYDROCHLORIDE 10 MG: 10 TABLET ORAL at 10:30

## 2025-07-28 RX ADMIN — PREDNISONE 40 MG: 20 TABLET ORAL at 10:31

## 2025-07-28 NOTE — PROGRESS NOTES
Chief Complaint   Patient presents with    Urgent Care     Pt states she has had itching and redness started yesterday. She has not tried any benadryl or allergie related medication. She had wild rice brats that she had  last night that she bough last night.     SUBJECTIVE:  Inessa Castaneda is a 70 year old female presenting with itchy erythematous hives to scalp face trunk chest arms groin since 0200.  She declines throat closure wheezing cough shortness of breath lip tongue swelling GI upset nausea vomiting diarrhea syncope dizziness.  No relevant past medical history of hives or allergic reactions,  except had a rash with sulfa antibiotics.  She declines any recent new exposures.  Had a cherry pie and wild rice broths for dinner.  Was stung by a bee 10 days ago and that seems to be healing well.  No URI.  Has not taken anything for the symptoms.    Past Medical History:   Diagnosis Date    Adenomatous colon polyp at age 52     normal colonoscopy at age 55 and then at age 60    Cystocele     midline     Depression, major, recurrent, in partial remission dx'd in her late 30's     varied with hormones, too. has been on 4 different medications     Depressive disorder ?    Currently on medication    DVT (deep venous thrombosis) (H) 2008    s/p bladder sling surgery /repair , no further work up done    History of thrombophlebitis     Hyperlipidemia LDL goal <130     Spider veins     STD (sexually transmitted disease)     Herpes    Urinary urgency      Current Outpatient Medications   Medication Sig Dispense Refill    predniSONE (DELTASONE) 20 MG tablet 2 tabs for 2 days, 1 tab for 2 days, 0.5 tab for 2 days 7 tablet 0    calcium carbonate-vitamin D 600-200 MG-UNIT TABS Take 1 tablet by mouth      folic acid (FOLVITE) 1 MG tablet Take 1 tablet (1 mg) by mouth daily      Multiple Vitamins-Minerals (MULTIVITAMIN WOMEN PO)       omega 3 1000 MG CAPS Take 2 g by mouth daily 180 capsule 1    simvastatin (ZOCOR) 40 MG tablet  TAKE 1 TABLET IN THE EVENING 90 tablet 3    valACYclovir (VALTREX) 500 MG tablet 1 tab twice daily for 3 days per occurrence - use at first sign of return lesions 18 tablet 3    venlafaxine (EFFEXOR XR) 37.5 MG 24 hr capsule Take 1 capsule (37.5 mg) by mouth daily. 90 capsule 1     No current facility-administered medications for this visit.     Social History     Tobacco Use    Smoking status: Never    Smokeless tobacco: Never    Tobacco comments:     None   Substance Use Topics    Alcohol use: Yes     Comment: Very limited     Allergies   Allergen Reactions    Sulfa Antibiotics      rash       Review of Systems   ROS: 10 point ROS neg other than the symptoms noted above in the HPI.    OBJECTIVE:   /85   Pulse 78   Temp 98.6  F (37  C)   Resp 20   Wt 85.3 kg (188 lb)   SpO2 97%   BMI 29.51 kg/m      Physical Exam  Vitals reviewed.   Constitutional:       Appearance: Normal appearance.   HENT:      Head: Normocephalic and atraumatic.   Cardiovascular:      Rate and Rhythm: Normal rate.      Pulses: Normal pulses.   Pulmonary:      Effort: Pulmonary effort is normal.   Musculoskeletal:         General: Normal range of motion.   Skin:     General: Skin is warm and dry.      Findings: Erythema and rash (urticarial welted erythematous rash mostly to scalp face and trunk buttocks) present.   Neurological:      General: No focal deficit present.      Mental Status: She is alert and oriented to person, place, and time.   Psychiatric:         Mood and Affect: Mood normal.         Behavior: Behavior normal.       ASSESSMENT:    ICD-10-CM    1. Hives  L50.9 predniSONE (DELTASONE) tablet 40 mg     cetirizine (zyrTEC) tablet 10 mg     predniSONE (DELTASONE) 20 MG tablet      2. Angioedema, initial encounter  T78.3XXA predniSONE (DELTASONE) tablet 40 mg     cetirizine (zyrTEC) tablet 10 mg     predniSONE (DELTASONE) 20 MG tablet        PLAN:     Prednisone + zyrtec given in clinic for hives and angioedema (eyelid  swelling)  Unlikely to result in IgE mediated anaphylaxis out of first 4 hours from symptom onset without GI, respiratory, hypotensive involvement  Zyrtec in the mornings  Benadryl at night 25mg  Prednisone taper if needed, lingering, more hives  Otherwise, stop meds if improved  Avoid scratching.  Cool packs and cool showers.  Calamine lotion as needed  May apply over the counter hydrocortisone cream as needed for itch.  Discussed watching for more severe symptoms, including shortness of breath, swelling of lips, tongue, diffuculty breathing or worsening hives. Must be seen in emergency room immediately for epi or call 911.   Follow up with primary care provider if no improvement.    Urticaria is common, affecting up to 20 percent of the population. A presumptive trigger, such as common viral and bacterial infections, medications, food ingestion, or insect sting, can sometimes be identified for new-onset urticaria. Viral tends to not be highly pruritic. In patients with mild symptoms of new-onset urticaria, we suggest treatment with a nonsedating H1 antihistamine alone. In patients at low risk of complications from anticholinergic side effects (ie, young, healthy patients), use of a sedating H1 antihistamine at bedtime and a nonsedating H1 antihistamine during the day is a reasonable alternative. In patients with moderate-to-severe new-onset urticaria, we suggest adding an H2 antihistamine. In patients with prominent angioedema or persistent symptoms despite an H1 and H2 antihistamine, we suggest adding a brief course of oral glucocorticoids. We typically administer prednisone (30 to 60 mg daily) in adults or prednisolone (0.5 to 1 mg/kg/day) in children, tapered over five to seven days (UptoDate, 2019).    Follow up with primary care provider with any problems, questions or concerns or if symptoms worsen or fail to improve. Patient agreed to plan and verbalized understanding.    Ritika Meredith, [facility]  [place] [facility]

## 2025-07-28 NOTE — TELEPHONE ENCOUNTER
Reason for Disposition   SEVERE itching    Additional Information   Negative: Sudden onset of rash (within last 2 hours) and difficulty with breathing or swallowing   Negative: Difficult to awaken or acting confused (e.g., disoriented, slurred speech)   Negative: Fever and purple or blood-colored spots or dots   Negative: Too weak or sick to stand   Negative: Life-threatening reaction (anaphylaxis) in the past to similar substance (e.g., food, insect bite/sting, chemical, etc.) and < 2 hours since exposure   Negative: Sounds like a life-threatening emergency to the triager   Negative: Drug rash suspected and started taking new medicine within last 2 weeks  (Exception: Antihistamine, eye drops, ear drops, decongestant or other OTC cough/cold medicines.)   Negative: Hives suspected   Negative: Insect bites suspected   Negative: MPOX SUSPECTED (e.g., direct skin contact such as sex, recent travel to [place] or [place]) and any SYMPTOMS OF MPOX (e.g., rash, fever, muscle aches, or swollen lymph nodes)   Negative: AT RISK FOR MPOX (men-who-have-sex-with-men) and POSSIBLE EXPOSURE (e.g., multiple sex partners in past 21 days) and ANY SYMPTOMS OF MPOX (e.g., rash, fever, muscle aches, or swollen lymph nodes)   Negative: Sunburn suspected   Negative: Bright red, sunburn-like rash and current tampon use or nasal packing   Negative: Bright red, sunburn-like rash and wound infection or recent surgery   Negative: Bright red skin that peels off in sheets   Negative: Stiff neck (can't touch chin to chest)   Negative: Patient sounds very sick or weak to the triager   Negative: Fever   Negative: Face becomes swollen   Negative: Headache   Negative: Purple or blood-colored spots or dots (no fever and sounds well to triager)   Negative: Joint pain or swelling   Negative: Bloody crusts on lips or sores in mouth   Negative: Large or small blisters on skin (i.e., fluid filled bubbles or sacs)   Negative: Pregnant   Negative: Rash  "began within 4 hours of a new prescription medication   Negative: Sore throat   Negative: Ring-like appearance of rash (or ask: does it look like a 'target' or 'bulls-eye')   Negative: Patient wants to be seen    Answer Assessment - Initial Assessment Questions  1. APPEARANCE of RASH: \"Describe the rash.\" (e.g., spots, blisters, raised areas, skin peeling, scaly)      Red spots like mistikito bites - raised     2. SIZE: \"How big are the spots?\" (e.g., tip of pen, eraser, coin; inches, centimeters)      Tip of an eraser   3. LOCATION: \"Where is the rash located?\"      Neck, scalp, left and right legs, right elbow     4. COLOR: \"What color is the rash?\" (Note: It is difficult to assess rash color in people with darker-colored skin. When this situation occurs, simply ask the caller to describe what they see.)      Red     5. ONSET: \"When did the rash begin?\"      Last night - it got worse, but she was noticing red bumps randomly on body but over night it got worse and she was very itchy.     6. FEVER: \"Do you have a fever?\" If Yes, ask: \"What is your temperature, how was it measured, and when did it start?\"      None     7. ITCHING: \"Does the rash itch?\" If Yes, ask: \"How bad is the itch?\" (Scale 1-10; or mild, moderate, severe)      Severe itching     8. CAUSE: \"What do you think is causing the rash?\"      Unsure - she did get stung by a bee over a week ago     9. MEDICINE FACTORS: \"Have you started any new medicines within the last 2 weeks?\" (e.g., antibiotics)       None     10. OTHER SYMPTOMS: \"Do you have any other symptoms?\" (e.g., dizziness, headache, sore throat, joint pain)        Denies: nausea, vomiting, dizziness, headaches, sore throat and joint pain, SOB, or CP     11. PREGNANCY: \"Is there any chance you are pregnant?\" \"When was your last menstrual period?\"        NA    Protocols used: Rash or Redness - Widespread-A-[place]    "

## 2025-07-28 NOTE — PROGRESS NOTES
Urgent Care Clinic Visit    Chief Complaint   Patient presents with    Urgent Care     Pt states she has had itching and redness started yesterday. She has not tried any benadryl or allergie related medication. She had wild rice brats that she had  last night that she bough last night.               7/28/2025    10:04 AM   Additional Questions   Roomed by Emily         7/28/2025    10:04 AM   Patient Reported Additional Medications   Patient reports taking the following new medications creatine

## 2025-07-28 NOTE — PATIENT INSTRUCTIONS
Prednisone + zyrtec given in clinic for hives and angioedema (eyelid swelling)  Unlikely to result in IgE mediated anaphylaxis out of first 4 hours from symptom onset without GI, respiratory, hypotensive involvement  Zyrtec in the mornings  Benadryl at night 25mg  Prednisone taper if needed, lingering, more hives  Otherwise, stop meds if improved  Avoid scratching.  Cool packs and cool showers.  Calamine lotion as needed  May apply over the counter hydrocortisone cream as needed for itch.  Discussed watching for more severe symptoms, including shortness of breath, swelling of lips, tongue, diffuculty breathing or worsening hives. Must be seen in emergency room immediately for epi or call 911.   Follow up with primary care provider if no improvement.    Urticaria is common, affecting up to 20 percent of the population. A presumptive trigger, such as common viral and bacterial infections, medications, food ingestion, or insect sting, can sometimes be identified for new-onset urticaria. Viral tends to not be highly pruritic. In patients with mild symptoms of new-onset urticaria, we suggest treatment with a nonsedating H1 antihistamine alone. In patients at low risk of complications from anticholinergic side effects (ie, young, healthy patients), use of a sedating H1 antihistamine at bedtime and a nonsedating H1 antihistamine during the day is a reasonable alternative. In patients with moderate-to-severe new-onset urticaria, we suggest adding an H2 antihistamine. In patients with prominent angioedema or persistent symptoms despite an H1 and H2 antihistamine, we suggest adding a brief course of oral glucocorticoids. We typically administer prednisone (30 to 60 mg daily) in adults or prednisolone (0.5 to 1 mg/kg/day) in children, tapered over five to seven days (UptoDate, 2019).